# Patient Record
Sex: FEMALE | Race: WHITE | Employment: OTHER | URBAN - METROPOLITAN AREA
[De-identification: names, ages, dates, MRNs, and addresses within clinical notes are randomized per-mention and may not be internally consistent; named-entity substitution may affect disease eponyms.]

---

## 2021-04-02 ENCOUNTER — DOCUMENTATION ONLY (OUTPATIENT)
Dept: INTERNAL MEDICINE CLINIC | Age: 66
End: 2021-04-02

## 2021-04-02 ENCOUNTER — OFFICE VISIT (OUTPATIENT)
Dept: INTERNAL MEDICINE CLINIC | Age: 66
End: 2021-04-02
Payer: MEDICARE

## 2021-04-02 ENCOUNTER — HOSPITAL ENCOUNTER (OUTPATIENT)
Dept: LAB | Age: 66
Discharge: HOME | End: 2021-04-02
Payer: MEDICARE

## 2021-04-02 VITALS
TEMPERATURE: 97.3 F | SYSTOLIC BLOOD PRESSURE: 118 MMHG | RESPIRATION RATE: 14 BRPM | HEART RATE: 63 BPM | DIASTOLIC BLOOD PRESSURE: 52 MMHG | OXYGEN SATURATION: 95 % | WEIGHT: 214 LBS | HEIGHT: 62 IN | BODY MASS INDEX: 39.38 KG/M2

## 2021-04-02 DIAGNOSIS — Z78.0 POSTMENOPAUSAL STATE: ICD-10-CM

## 2021-04-02 DIAGNOSIS — K21.9 GASTROESOPHAGEAL REFLUX DISEASE WITHOUT ESOPHAGITIS: ICD-10-CM

## 2021-04-02 DIAGNOSIS — I10 ESSENTIAL HYPERTENSION: ICD-10-CM

## 2021-04-02 DIAGNOSIS — E78.5 HYPERLIPIDEMIA, UNSPECIFIED HYPERLIPIDEMIA TYPE: ICD-10-CM

## 2021-04-02 DIAGNOSIS — Z12.11 COLON CANCER SCREENING: ICD-10-CM

## 2021-04-02 DIAGNOSIS — G20 PARKINSON'S DISEASE (HCC): ICD-10-CM

## 2021-04-02 DIAGNOSIS — E11.65 TYPE 2 DIABETES MELLITUS WITH HYPERGLYCEMIA, WITHOUT LONG-TERM CURRENT USE OF INSULIN (HCC): ICD-10-CM

## 2021-04-02 DIAGNOSIS — Z23 ENCOUNTER FOR IMMUNIZATION: ICD-10-CM

## 2021-04-02 DIAGNOSIS — Z12.31 ENCOUNTER FOR SCREENING MAMMOGRAM FOR MALIGNANT NEOPLASM OF BREAST: ICD-10-CM

## 2021-04-02 DIAGNOSIS — Z13.31 POSITIVE DEPRESSION SCREENING: ICD-10-CM

## 2021-04-02 DIAGNOSIS — Z76.89 ENCOUNTER TO ESTABLISH CARE: ICD-10-CM

## 2021-04-02 DIAGNOSIS — M17.0 PRIMARY OSTEOARTHRITIS OF BOTH KNEES: ICD-10-CM

## 2021-04-02 DIAGNOSIS — N32.81 OAB (OVERACTIVE BLADDER): ICD-10-CM

## 2021-04-02 DIAGNOSIS — F41.9 ANXIETY: ICD-10-CM

## 2021-04-02 DIAGNOSIS — G25.81 RLS (RESTLESS LEGS SYNDROME): ICD-10-CM

## 2021-04-02 DIAGNOSIS — Z13.6 SCREENING FOR ISCHEMIC HEART DISEASE: ICD-10-CM

## 2021-04-02 DIAGNOSIS — Z71.89 ADVANCED DIRECTIVES, COUNSELING/DISCUSSION: ICD-10-CM

## 2021-04-02 DIAGNOSIS — M54.10 RADICULOPATHY OF ARM: ICD-10-CM

## 2021-04-02 DIAGNOSIS — Z79.899 MEDICATION MANAGEMENT: ICD-10-CM

## 2021-04-02 DIAGNOSIS — G47.30 SLEEP APNEA, UNSPECIFIED TYPE: ICD-10-CM

## 2021-04-02 DIAGNOSIS — Z00.00 MEDICARE ANNUAL WELLNESS VISIT, SUBSEQUENT: Primary | ICD-10-CM

## 2021-04-02 LAB
ALBUMIN SERPL-MCNC: 3.5 G/DL (ref 3.4–5)
ALBUMIN/GLOB SERPL: 1.1 {RATIO} (ref 0.8–1.7)
ALP SERPL-CCNC: 126 U/L (ref 45–117)
ALT SERPL-CCNC: 12 U/L (ref 13–56)
ANION GAP SERPL CALC-SCNC: 7 MMOL/L (ref 3–18)
AST SERPL-CCNC: 19 U/L (ref 10–38)
BILIRUB SERPL-MCNC: 0.6 MG/DL (ref 0.2–1)
BUN SERPL-MCNC: 12 MG/DL (ref 7–18)
BUN/CREAT SERPL: 17 (ref 12–20)
CALCIUM SERPL-MCNC: 8.9 MG/DL (ref 8.5–10.1)
CHLORIDE SERPL-SCNC: 105 MMOL/L (ref 100–111)
CHOLEST SERPL-MCNC: 122 MG/DL
CO2 SERPL-SCNC: 30 MMOL/L (ref 21–32)
CREAT SERPL-MCNC: 0.69 MG/DL (ref 0.6–1.3)
GLOBULIN SER CALC-MCNC: 3.1 G/DL (ref 2–4)
GLUCOSE SERPL-MCNC: 113 MG/DL (ref 74–99)
HBA1C MFR BLD: 6.1 % (ref 4.2–5.6)
HDLC SERPL-MCNC: 36 MG/DL (ref 40–60)
HDLC SERPL: 3.4 {RATIO} (ref 0–5)
LDLC SERPL CALC-MCNC: 42.6 MG/DL (ref 0–100)
LIPID PROFILE,FLP: ABNORMAL
POTASSIUM SERPL-SCNC: 4.8 MMOL/L (ref 3.5–5.5)
PROT SERPL-MCNC: 6.6 G/DL (ref 6.4–8.2)
SODIUM SERPL-SCNC: 142 MMOL/L (ref 136–145)
T4 FREE SERPL-MCNC: 1.2 NG/DL (ref 0.7–1.5)
TRIGL SERPL-MCNC: 217 MG/DL (ref ?–150)
TSH SERPL DL<=0.05 MIU/L-ACNC: 1.28 UIU/ML (ref 0.36–3.74)
VLDLC SERPL CALC-MCNC: 43.4 MG/DL

## 2021-04-02 PROCEDURE — G9899 SCRN MAM PERF RSLTS DOC: HCPCS | Performed by: NURSE PRACTITIONER

## 2021-04-02 PROCEDURE — 1101F PT FALLS ASSESS-DOCD LE1/YR: CPT | Performed by: NURSE PRACTITIONER

## 2021-04-02 PROCEDURE — 80061 LIPID PANEL: CPT

## 2021-04-02 PROCEDURE — G0009 ADMIN PNEUMOCOCCAL VACCINE: HCPCS | Performed by: NURSE PRACTITIONER

## 2021-04-02 PROCEDURE — 3017F COLORECTAL CA SCREEN DOC REV: CPT | Performed by: NURSE PRACTITIONER

## 2021-04-02 PROCEDURE — G8754 DIAS BP LESS 90: HCPCS | Performed by: NURSE PRACTITIONER

## 2021-04-02 PROCEDURE — G0447 BEHAVIOR COUNSEL OBESITY 15M: HCPCS | Performed by: NURSE PRACTITIONER

## 2021-04-02 PROCEDURE — 90732 PPSV23 VACC 2 YRS+ SUBQ/IM: CPT | Performed by: NURSE PRACTITIONER

## 2021-04-02 PROCEDURE — 84439 ASSAY OF FREE THYROXINE: CPT

## 2021-04-02 PROCEDURE — G8432 DEP SCR NOT DOC, RNG: HCPCS | Performed by: NURSE PRACTITIONER

## 2021-04-02 PROCEDURE — G8427 DOCREV CUR MEDS BY ELIG CLIN: HCPCS | Performed by: NURSE PRACTITIONER

## 2021-04-02 PROCEDURE — G8419 CALC BMI OUT NRM PARAM NOF/U: HCPCS | Performed by: NURSE PRACTITIONER

## 2021-04-02 PROCEDURE — 83036 HEMOGLOBIN GLYCOSYLATED A1C: CPT

## 2021-04-02 PROCEDURE — 80053 COMPREHEN METABOLIC PANEL: CPT

## 2021-04-02 PROCEDURE — G0439 PPPS, SUBSEQ VISIT: HCPCS | Performed by: NURSE PRACTITIONER

## 2021-04-02 PROCEDURE — G8536 NO DOC ELDER MAL SCRN: HCPCS | Performed by: NURSE PRACTITIONER

## 2021-04-02 PROCEDURE — 99497 ADVNCD CARE PLAN 30 MIN: CPT | Performed by: NURSE PRACTITIONER

## 2021-04-02 PROCEDURE — 1090F PRES/ABSN URINE INCON ASSESS: CPT | Performed by: NURSE PRACTITIONER

## 2021-04-02 PROCEDURE — G8400 PT W/DXA NO RESULTS DOC: HCPCS | Performed by: NURSE PRACTITIONER

## 2021-04-02 PROCEDURE — G8752 SYS BP LESS 140: HCPCS | Performed by: NURSE PRACTITIONER

## 2021-04-02 PROCEDURE — 99215 OFFICE O/P EST HI 40 MIN: CPT | Performed by: NURSE PRACTITIONER

## 2021-04-02 RX ORDER — ASPIRIN 81 MG/1
TABLET ORAL DAILY
COMMUNITY

## 2021-04-02 RX ORDER — ATORVASTATIN CALCIUM 20 MG/1
TABLET, FILM COATED ORAL DAILY
COMMUNITY
End: 2021-04-05 | Stop reason: SDUPTHER

## 2021-04-02 RX ORDER — GABAPENTIN 100 MG/1
CAPSULE ORAL 3 TIMES DAILY
COMMUNITY
End: 2021-04-05 | Stop reason: DRUGHIGH

## 2021-04-02 RX ORDER — OXYBUTYNIN CHLORIDE 5 MG/1
5 TABLET ORAL 3 TIMES DAILY
COMMUNITY
End: 2021-04-05 | Stop reason: SDUPTHER

## 2021-04-02 RX ORDER — FLUOXETINE HYDROCHLORIDE 20 MG/1
CAPSULE ORAL DAILY
COMMUNITY
End: 2021-04-05 | Stop reason: SDUPTHER

## 2021-04-02 RX ORDER — PANTOPRAZOLE SODIUM 20 MG/1
20 TABLET, DELAYED RELEASE ORAL DAILY
COMMUNITY
End: 2021-04-05 | Stop reason: SDUPTHER

## 2021-04-02 RX ORDER — CARBIDOPA AND LEVODOPA 25; 100 MG/1; MG/1
1 TABLET ORAL 3 TIMES DAILY
COMMUNITY
End: 2021-04-05 | Stop reason: SDUPTHER

## 2021-04-02 RX ORDER — BISOPROLOL FUMARATE 5 MG/1
5 TABLET ORAL DAILY
COMMUNITY
End: 2021-04-05 | Stop reason: SINTOL

## 2021-04-02 RX ORDER — FLUOXETINE HYDROCHLORIDE 40 MG/1
CAPSULE ORAL DAILY
COMMUNITY
End: 2021-04-05 | Stop reason: SDUPTHER

## 2021-04-02 NOTE — PROGRESS NOTES
Chief Complaint   Patient presents with   Carl Grimaldo \[facility]\"" Care    Arm Pain     right arm pain, onset 2 weeks, worsens at night

## 2021-04-02 NOTE — ACP (ADVANCE CARE PLANNING)
Advance Care Planning     General Advance Care Planning (ACP) Conversation      Date of Conversation: 4/2/2021  Conducted with: Patient with Decision Making Capacity    Healthcare Decision Maker:     Click here to complete [employer] including selection of the [employer] Relationship (ie \"Primary\")  Today we documented Decision Maker(s) consistent with Legal Next of Kin hierarchy. Content/Action Overview:    Has ACP document(s) NOT on file - requested patient to provide  Reviewed DNR/DNI and patient elects Full Code (Attempt Resuscitation)  Topics discussed: ventilation preferences, hospitalization preferences and resuscitation preferences       Length of Voluntary ACP Conversation in minutes:  16 minutes    Deborra Lipoma, DNP

## 2021-04-02 NOTE — PATIENT INSTRUCTIONS
Vaccine Information Statement    Pneumococcal Polysaccharide Vaccine (PPSV23): What You Need to Know    Many Vaccine Information Statements are available in [place] and other languages. See www.immunize.org/vis  Hojas de información sobre vacunas están disponibles en español y en muchos otros idiomas. Visite www.immunize.org/vis    1. Why get vaccinated? Pneumococcal polysaccharide vaccine (PPSV23) can prevent pneumococcal disease. Pneumococcal disease refers to any illness caused by pneumococcal bacteria. These bacteria can cause many types of illnesses, including pneumonia, which is an infection of the lungs. Pneumococcal bacteria are one of the most common causes of pneumonia. Besides pneumonia, pneumococcal bacteria can also cause:   Ear infections   Sinus infections   Meningitis (infection of the tissue covering the brain and spinal cord)   Bacteremia (bloodstream infection)    Anyone can get pneumococcal disease, but children under 3years of age, people with certain medical conditions, adults 72 years or older, and cigarette smokers are at the highest risk. Most pneumococcal infections are mild. However, some can result in long-term problems, such as brain damage or hearing loss. Meningitis, bacteremia, and pneumonia caused by pneumococcal disease can be fatal.     2. PPSV23     PPSV23 protects against 23 types of bacteria that cause pneumococcal disease. PPSV23 is recommended for:   All adults 72 years or older,   Anyone 2 years or older with certain medical conditions that can lead to an increased risk for pneumococcal disease. Most people need only one dose of PPSV23. A second dose of PPSV23, and another type of pneumococcal vaccine called PCV13, are recommended for certain high-risk groups. Your health care provider can give you more information.     People 65 years or older should get a dose of PPSV23 even if they have already gotten one or more doses of the vaccine before they turned 72.    3. Talk with your health care provider    Tell your vaccine provider if the person getting the vaccine:   Has had an allergic reaction after a previous dose of PPSV23, or has any severe, life-threatening allergies. In some cases, your health care provider may decide to postpone PPSV23 vaccination to a future visit. People with minor illnesses, such as a cold, may be vaccinated. People who are moderately or severely ill should usually wait until they recover before getting PPSV23. Your health care provider can give you more information. 4. Risks of a vaccine reaction     Redness or pain where the shot is given, feeling tired, fever, or muscle aches can happen after PPSV23. People sometimes faint after medical procedures, including vaccination. Tell your provider if you feel dizzy or have vision changes or ringing in the ears. As with any medicine, there is a very remote chance of a vaccine causing a severe allergic reaction, other serious injury, or death. 5. What if there is a serious problem? An allergic reaction could occur after the vaccinated person leaves the clinic. If you see signs of a severe allergic reaction (hives, swelling of the face and throat, difficulty breathing, a fast heartbeat, dizziness, or weakness), call 9-1-1 and get the person to the nearest hospital.    For other signs that concern you, call your health care provider. Adverse reactions should be reported to the Vaccine Adverse Event Reporting System (VAERS). Your health care provider will usually file this report, or you can do it yourself. Visit the VAERS website at www.vaers. hhs.gov or call 1-900.684.5365. VAERS is only for reporting reactions, and VAERS staff do not give medical advice. 6. How can I learn more?  Ask your health care provider.  Call your local or state health department.    Contact the Centers for Disease Control and Prevention (CDC):  - Call 9-856.543.1421 (4-883-DNQ-INFO) or  - Visit CDCs website at www.cdc.gov/vaccines    Vaccine Information Statement   PPSV23   10/30/2019    [facility] and [facility] for Disease Control and Prevention    Office Use Only      Medicare Wellness Visit, Female     The best way to live healthy is to have a lifestyle where you eat a well-balanced diet, exercise regularly, limit alcohol use, and quit all forms of tobacco/nicotine, if applicable. Regular preventive services are another way to keep healthy. Preventive services (vaccines, screening tests, monitoring & exams) can help personalize your care plan, which helps you manage your own care. Screening tests can find health problems at the earliest stages, when they are easiest to treat. [facility] follows the current, evidence-based guidelines published by the [facility] [employer] ([facility]) when recommending preventive services for our patients. Because we follow these guidelines, sometimes recommendations change over time as research supports it. (For example, mammograms used to be recommended annually. Even though Medicare will still pay for an annual mammogram, the newer guidelines recommend a mammogram every two years for women of average risk). Of course, you and your doctor may decide to screen more often for some diseases, based on your risk and your co-morbidities (chronic disease you are already diagnosed with). Preventive services for you include:  - Medicare offers their members a free annual wellness visit, which is time for you and your primary care provider to discuss and plan for your preventive service needs. Take advantage of this benefit every year!  -All adults over the age of 72 should receive the recommended pneumonia vaccines.  Current USPSTF guidelines recommend a series of two vaccines for the best pneumonia protection.   -All adults should have a flu vaccine yearly and a tetanus vaccine every 10 years.   -All adults age 48 and older should receive the shingles vaccines (series of two vaccines). -All adults age 38-68 who are overweight should have a diabetes screening test once every three years.   -All adults born between [birth year] and 1965 should be screened once for Hepatitis C.  -Other screening tests and preventive services for persons with diabetes include: an eye exam to screen for diabetic retinopathy, a kidney function test, a foot exam, and stricter control over your cholesterol.   -Cardiovascular screening for adults with routine risk involves an electrocardiogram (ECG) at intervals determined by your doctor.   -Colorectal cancer screenings should be done for adults age 54-65 with no increased risk factors for colorectal cancer. There are a number of acceptable methods of screening for this type of cancer. Each test has its own benefits and drawbacks. Discuss with your doctor what is most appropriate for you during your annual wellness visit. The different tests include: colonoscopy (considered the best screening method), a fecal occult blood test, a fecal DNA test, and sigmoidoscopy.    -A bone mass density test is recommended when a woman turns 65 to screen for osteoporosis. This test is only recommended one time, as a screening. Some providers will use this same test as a disease monitoring tool if you already have osteoporosis. -Breast cancer screenings are recommended every other year for women of normal risk, age 54-69.  -Cervical cancer screenings for women over age 72 are only recommended with certain risk factors.      Here is a list of your current Health Maintenance items (your personalized list of preventive services) with a due date:  Health Maintenance Due   Topic Date Due    Hepatitis C Test  Never done    Cholesterol Test   Never done    COVID-19 Vaccine (1) Never done    DTaP/Tdap/Td  (1 - Tdap) Never done    Shingles Vaccine (1 of 2) Never done   [facility] Colorectal Screening  Never done    Mammogram  Never done    Bone Mineral Density   Never done    Pneumococcal Vaccine (1 of 1 - PPSV23) Never done

## 2021-04-02 NOTE — PROGRESS NOTES
Nancy Burger 1955 female who presents for routine immunizations. Patient denies any symptoms , reactions or allergies that would exclude them from being immunized today. Risks and adverse reactions were discussed and the VIS was given to them. All questions were addressed. Order placed for Pneumonia 23 (left deltoid), per Verbal Order from [place], [place], with read back. Patient declined observation.     Ling Thomas, CMA

## 2021-04-02 NOTE — PROGRESS NOTES
This is the Subsequent Medicare Annual Wellness Exam, performed 12 months or more after the Initial AWV or the last Subsequent AWV    I have reviewed the patient's medical history in detail and updated the computerized patient record. Assessment/Plan   Education and counseling provided:  Are appropriate based on today's review and evaluation  Pneumococcal Vaccine  Screening Mammography  Colorectal cancer screening tests  Cardiovascular screening blood test  Bone mass measurement (DEXA)  Screening for glaucoma  Shingrix and Shingles vaccine. Overall pt is doing well. 1. Medicare annual wellness visit, subsequent  2. Advanced directives, counseling/discussion  -     FULL CODE  3. Screening for ischemic heart disease  -     LIPID PANEL; Future  -     LIPID PANEL; Future  4. Encounter for screening mammogram for malignant neoplasm of breast  -     LULÚ MAMMO BI SCREENING INCL CAD; Future  5. Postmenopausal state  -     DEXA BONE DENSITY STUDY AXIAL; Future  6. Body mass index 39.0-39.9, adult  -     ME BEHAVIOR  OBESITY 15M  7. Colon cancer screening  -     REFERRAL FOR COLONOSCOPY  8. Encounter for immunization  -     ADMIN PNEUMOCOCCAL VACCINE  -     ADMIN PNEUMOCOCCAL VACCINE  -     PNEUMOCOCCAL POLYSACCHARIDE VACCINE, 23-VALENT, ADULT OR IMMUNOSUPPRESSED PT DOSE,  9. Hyperlipidemia, unspecified hyperlipidemia type  -     TSH AND FREE T4; Future  -     LIPID PANEL; Future  -     METABOLIC PANEL, COMPREHENSIVE; Future  -     HEMOGLOBIN A1C W/O EAG; Future  10. Encounter to establish care  11. Essential hypertension  -     TSH AND FREE T4; Future  -     LIPID PANEL; Future  -     METABOLIC PANEL, COMPREHENSIVE; Future  -     HEMOGLOBIN A1C W/O EAG; Future  12. Parkinson's disease ([facility] [address]  13. Anxiety  14. Sleep apnea, unspecified type  -     REFERRAL TO PULMONARY DISEASE  15. OAB (overactive bladder)  16. Primary osteoarthritis of both knees  17. RLS (restless legs syndrome)  18.  Radiculopathy of arm Depression Risk Factor Screening     3 most recent PHQ Screens 4/2/2021   Little interest or pleasure in doing things Not at all   Feeling down, depressed, irritable, or hopeless Not at all   Total Score PHQ 2 0   Trouble falling or staying asleep, or sleeping too much Nearly every day   Feeling tired or having little energy Nearly every day   Poor appetite, weight loss, or overeating Not at all   Feeling bad about yourself - or that you are a failure or have let yourself or your family down Not at all   Trouble concentrating on things such as school, work, reading, or watching TV Not at all   Moving or speaking so slowly that other people could have noticed; or the opposite being so fidgety that others notice Not at all   Thoughts of being better off dead, or hurting yourself in some way Not at all   PHQ 9 Score 6   How difficult have these problems made it for you to do your work, take care of your home and get along with others Not difficult at all       Alcohol Risk Screen    Do you average more than 1 drink per night or more than 7 drinks a week:  No    On any one occasion in the past three months have you have had more than 3 drinks containing alcohol:  No        Functional Ability and Level of Safety    Hearing: Hearing is good. Activities of Daily Living: The home contains: no safety equipment. Patient does total self care      Ambulation: with no difficulty     Fall Risk:  Fall Risk Assessment, last 12 mths 4/2/2021   Able to walk? Yes   Fall in past 12 months? 0   Do you feel unsteady? 1   Are you worried about falling 1   Is TUG test greater than 12 seconds?  0   Is the gait abnormal? 1   Number of falls in past 12 months 0      Abuse Screen:  Patient is not abused       Cognitive Screening    Has your family/caregiver stated any concerns about your memory: no     Cognitive Screening: Normal - Clock Drawing Test    Health Maintenance Due     Health Maintenance Due   Topic Date Due    Hepatitis C Screening  Never done    COVID-19 Vaccine (1) Never done    DTaP/Tdap/Td series (1 - Tdap) Never done    Shingrix Vaccine Age 50> (1 of 2) Never done    Colorectal Cancer Screening Combo  Never done    Breast Cancer Screen Mammogram  Never done    Bone Densitometry (Dexa) Screening  Never done       Patient Care Team   Patient Care Team:  Thurman Boeck, DNP as PCP - General (Nurse Practitioner)  Thurman Boeck, DNP as PCP - [facility] Empaneled Provider    History   There is no problem list on file for this patient. History reviewed. No pertinent past medical history. History reviewed. No pertinent surgical history. Current Outpatient Medications   Medication Sig Dispense Refill    bisoprolol (ZEBETA) 5 mg tablet Take 5 mg by mouth daily.  FLUoxetine (PROzac) 20 mg capsule Take  by mouth daily.  FLUoxetine (PROzac) 40 mg capsule Take  by mouth daily.  gabapentin (NEURONTIN) 100 mg capsule Take  by mouth three (3) times daily.  aspirin delayed-release 81 mg tablet Take  by mouth daily.  pantoprazole (PROTONIX) 20 mg tablet Take 20 mg by mouth daily.  atorvastatin (LIPITOR) 20 mg tablet Take  by mouth daily.  oxybutynin (DITROPAN) 5 mg tablet Take 5 mg by mouth three (3) times daily.  carbidopa-levodopa (SINEMET)  mg per tablet Take 1 Tab by mouth three (3) times daily.        Allergies   Allergen Reactions    Latex Rash and Itching    Sulfa (Sulfonamide Antibiotics) Anaphylaxis       Family History   Problem Relation Age of Onset    Thyroid Disease Mother     Hypertension Mother     Diabetes Brother     Diabetes Brother     Heart Attack Brother      Social History     Tobacco Use    Smoking status: Never Smoker    Smokeless tobacco: Never Used   Substance Use Topics    Alcohol use: Not Currently         Joann Encarnacion DNP

## 2021-04-05 DIAGNOSIS — Z78.0 POSTMENOPAUSAL STATE: ICD-10-CM

## 2021-04-05 DIAGNOSIS — Z12.31 ENCOUNTER FOR SCREENING MAMMOGRAM FOR MALIGNANT NEOPLASM OF BREAST: ICD-10-CM

## 2021-04-05 PROBLEM — G20 PARKINSON'S DISEASE (HCC): Status: ACTIVE | Noted: 2021-04-05

## 2021-04-05 PROBLEM — I10 ESSENTIAL HYPERTENSION: Status: ACTIVE | Noted: 2021-04-05

## 2021-04-05 PROBLEM — N32.81 OAB (OVERACTIVE BLADDER): Status: ACTIVE | Noted: 2021-04-05

## 2021-04-05 PROBLEM — M17.0 PRIMARY OSTEOARTHRITIS OF BOTH KNEES: Status: ACTIVE | Noted: 2021-04-05

## 2021-04-05 PROBLEM — G25.81 RLS (RESTLESS LEGS SYNDROME): Status: ACTIVE | Noted: 2021-04-05

## 2021-04-05 PROBLEM — G47.30 SLEEP APNEA: Status: ACTIVE | Noted: 2021-04-05

## 2021-04-05 PROBLEM — E11.65 TYPE 2 DIABETES MELLITUS WITH HYPERGLYCEMIA, WITHOUT LONG-TERM CURRENT USE OF INSULIN (HCC): Status: ACTIVE | Noted: 2021-04-05

## 2021-04-05 PROBLEM — M54.10 RADICULOPATHY OF ARM: Status: ACTIVE | Noted: 2021-04-05

## 2021-04-05 PROBLEM — E78.5 HYPERLIPIDEMIA: Status: ACTIVE | Noted: 2021-04-05

## 2021-04-05 PROBLEM — F41.9 ANXIETY: Status: ACTIVE | Noted: 2021-04-05

## 2021-04-05 RX ORDER — ATORVASTATIN CALCIUM 20 MG/1
20 TABLET, FILM COATED ORAL DAILY
Qty: 90 TAB | Refills: 0 | Status: SHIPPED | OUTPATIENT
Start: 2021-04-05 | End: 2021-07-02 | Stop reason: SDUPTHER

## 2021-04-05 RX ORDER — GABAPENTIN 300 MG/1
300 CAPSULE ORAL 3 TIMES DAILY
Qty: 270 CAP | Refills: 0 | Status: SHIPPED | OUTPATIENT
Start: 2021-04-05 | End: 2021-06-07

## 2021-04-05 RX ORDER — CARBIDOPA AND LEVODOPA 25; 100 MG/1; MG/1
1 TABLET ORAL 3 TIMES DAILY
Qty: 270 TAB | Refills: 0 | Status: SHIPPED | OUTPATIENT
Start: 2021-04-05 | End: 2021-06-07 | Stop reason: DRUGHIGH

## 2021-04-05 RX ORDER — PANTOPRAZOLE SODIUM 20 MG/1
20 TABLET, DELAYED RELEASE ORAL DAILY
Qty: 90 TAB | Refills: 3 | Status: SHIPPED | OUTPATIENT
Start: 2021-04-05 | End: 2021-12-22 | Stop reason: SDUPTHER

## 2021-04-05 RX ORDER — FLUOXETINE HYDROCHLORIDE 20 MG/1
20 CAPSULE ORAL DAILY
Qty: 90 CAP | Refills: 0 | Status: SHIPPED | OUTPATIENT
Start: 2021-04-05 | End: 2021-04-20 | Stop reason: ALTCHOICE

## 2021-04-05 RX ORDER — FLUOXETINE HYDROCHLORIDE 40 MG/1
40 CAPSULE ORAL DAILY
Qty: 90 CAP | Refills: 0 | Status: SHIPPED | OUTPATIENT
Start: 2021-04-05 | End: 2021-04-20 | Stop reason: ALTCHOICE

## 2021-04-05 RX ORDER — OXYBUTYNIN CHLORIDE 5 MG/1
5 TABLET ORAL 3 TIMES DAILY
Qty: 90 TAB | Refills: 1 | Status: SHIPPED | OUTPATIENT
Start: 2021-04-05 | End: 2021-10-22

## 2021-04-05 RX ORDER — METFORMIN HYDROCHLORIDE 500 MG/1
500 TABLET ORAL
Qty: 90 TAB | Refills: 0 | Status: SHIPPED | OUTPATIENT
Start: 2021-04-05 | End: 2021-04-20

## 2021-04-05 NOTE — PROGRESS NOTES
[facility] [address] [place] [address] [place], [address]  [phone] [id number]/[phone] fax    4/2/2021    HPI:   J Carlos Thompson 1955 is a pleasant OTHER female who presents today to establish care and for routine physical exam.  Patient recently moved to [place] from [place] in March 2021. She is a native of [address] [place] [address]. She is accompanied by her daughter, Nehemiah Carreno. Patient speaks fairly good English but her daughter accompanies her to her appointments to assist with any language barriers. Today's concern: Right upper extremity pain that started 2 weeks ago. She complains of tingling which is worse at night that runs from her neck down to her fingers. She denies injury. Gabapentin does help ease the symptoms. Parkinson's disease: Patient was diagnosed in 2014. She is currently taking gabapentin and Sinemet and tolerating therapy well. Hypertension: Diagnosed in 2019. Due to multiple low blood pressure readings her hypertensive medications were discontinued. Cholesterol: Diagnosed in 2016. She continues atorvastatin 20 mg daily and tolerating therapy well. Anxiety: In 2001 she fell and fractured her ankle. Since this fall she has experienced great anxiety. She was seeing a psychiatrist in [place] who placed her on Prozac 20 mg plus Prozac 40 mg every day. This therapy has been very effective. Although she still has some anxiety it is much better controlled under the Prozac therapy. Sleep apnea: Diagnosed in 2010. She is currently not on a CPAP.    RLS: Diagnosed 2019. Taking gabapentin which has been effective for her symptoms. GERD: Taking pantoprazole 20mg daily. Therapy effective. Bilateral knee primary osteoarthritis: She has received a total of 6 injections to include gel to both of her knees. While sitting her pain score is a 4 while up and walking her pain can be a 10 out of 10. Her last injection was in March.   These last approximately 6 to 7 months. OAB: Diagnosed many years ago. She is taking oxybutynin only as needed. Mainly when she leaves the home to run errands. She has not taken this medication in over a year as she has been in her home due to Covid. HM: Colonoscopy completed 5 years ago while in [place]. She did have polyps removed. According to the daughter patient was to repeat her colonoscopy in 5 years. Daughter will try to obtain these records. She has not received her pneumonia 23 vaccination. She did receive 1 Covid vaccine prior to leaving [facility]. Now she is having a difficult time locating a site to give her her second vaccination. Current medications: Zebeta 5 mg daily, Prozac 20 mg p.o. 40 mg daily, gabapentin 100 mg 1 cap in the a.m., 1 At lunch, 3 capsules at dinner, 2 caps at bedtime, aspirin 81 mg daily, pantoprazole 20 mg daily, atorvastatin 25 mg daily, oxybutynin 5 mg 3 times daily, carbidopa levodopa 25/100 mg 3 times daily. Past Medical History:   Diagnosis Date    Anxiety 4/5/2021    Essential hypertension 4/5/2021    Hyperlipidemia 4/5/2021    OAB (overactive bladder) 4/5/2021    Parkinson's disease ([facility] [address] 4/5/2021    Primary osteoarthritis of both knees 4/5/2021    Radiculopathy of arm 4/5/2021    RLS (restless legs syndrome) 4/5/2021    Sleep apnea 4/5/2021     History reviewed. No pertinent surgical history. Current Outpatient Medications   Medication Sig    pantoprazole (PROTONIX) 20 mg tablet Take 1 Tab by mouth daily.  oxybutynin (DITROPAN) 5 mg tablet Take 1 Tab by mouth three (3) times daily.  gabapentin (NEURONTIN) 300 mg capsule Take 1 Cap by mouth three (3) times daily. Max Daily Amount: 900 mg.    FLUoxetine (PROzac) 40 mg capsule Take 1 Cap by mouth daily.  FLUoxetine (PROzac) 20 mg capsule Take 1 Cap by mouth daily.  carbidopa-levodopa (SINEMET)  mg per tablet Take 1 Tab by mouth three (3) times daily.  Indications: a type of movement disorder called parkinsonism    atorvastatin (LIPITOR) 20 mg tablet Take 1 Tab by mouth daily.  aspirin delayed-release 81 mg tablet Take  by mouth daily. No current facility-administered medications for this visit. Allergies and Intolerances: Allergies   Allergen Reactions    Latex Rash and Itching    Sulfa (Sulfonamide Antibiotics) Anaphylaxis     Family History:   Family History   Problem Relation Age of Onset    Thyroid Disease Mother     Hypertension Mother     Diabetes Brother     Diabetes Brother     Heart Attack Brother      Social History:   She  reports that she has never smoked. She has never used smokeless tobacco.   Social History     Substance and Sexual Activity   Alcohol Use Not Currently     Immunization History:  Immunization History   Administered Date(s) Administered    Pneumococcal Polysaccharide (PPSV-23) 04/02/2021       Review of Systems:   As above included in HPI. Otherwise 11 point review of systems negative including constitutional, skin, HENT, eyes, respiratory, cardiovascular, gastrointestinal, genitourinary, musculoskeletal, endocrine, hematologic, allergy, and neurologic. Physical:   Visit Vitals  BP (!) 118/52   Pulse 63   Temp 97.3 °F (36.3 °C) (Temporal)   Resp 14   Ht 5' 2\" (1.575 m)   Wt 214 lb (97.1 kg)   LMP  (LMP Unknown)   SpO2 95%   BMI 39.14 kg/m²      Wt Readings from Last 3 Encounters:   04/02/21 214 lb (97.1 kg)         Exam:   Physical Exam  Vitals signs and nursing note reviewed. Constitutional:       Appearance: Normal appearance. She is obese. Comments: Morbidly obese   HENT:      Head: Normocephalic and atraumatic. Right Ear: External ear normal.      Left Ear: External ear normal.      Nose: Nose normal.      Mouth/Throat:      Mouth: Mucous membranes are moist.   Eyes:      Extraocular Movements: Extraocular movements intact. Pupils: Pupils are equal, round, and reactive to light.    Neck: Musculoskeletal: Normal range of motion and neck supple. Cardiovascular:      Rate and Rhythm: Normal rate and regular rhythm. Heart sounds: Normal heart sounds. Comments: No edema noted  Pulmonary:      Effort: Pulmonary effort is normal. No respiratory distress. Breath sounds: Normal breath sounds. No wheezing. Abdominal:      General: Abdomen is flat. Palpations: Abdomen is soft. Musculoskeletal: Normal range of motion. Skin:     General: Skin is warm and dry. Neurological:      Mental Status: She is alert and oriented to person, place, and time. Comments: No fine tremors noted   Psychiatric:         Mood and Affect: Mood normal.         Behavior: Behavior normal.         Judgment: Judgment normal.       Health Maintenance:  Screening: (Yes/No means wether completed or not)   Mammogram: Ordered 4/2021   PAP smear: Will discuss with patient at follow-up appointment   Colorectal: Ordered 4/2021   Depression: Yes   DM (HbA1c/FPG): 4/2021 (6.1)   Hepatitis C: No   Falls: Yes   DEXA: Ordered 4/2021   Eye Exam: regular eye exams with ?  (last?)   Smoking: Never   Vitamin D: Ordered 4/2021   Medicare Wellness: 4/2/2021 subsequent      Review of Data:  Labs reviewed: N/A  Will obtain today    Impression:  Patient Active Problem List   Diagnosis Code    Hyperlipidemia E78.5    Essential hypertension I10    Parkinson's disease ([facility] [address] G20    Anxiety F41.9    Sleep apnea G47.30    OAB (overactive bladder) N32.81    Primary osteoarthritis of both knees M17.0    RLS (restless legs syndrome) G25.81    Radiculopathy of arm M54.10       Plan:    ICD-10-CM ICD-9-CM    1. Medicare annual wellness visit, subsequent  Z00.00 V70.0    2.  Advanced directives, counseling/discussion  Z71.89 V65.49 FULL CODE   3. Screening for ischemic heart disease  Z13.6 V81.0 LIPID PANEL      LIPID PANEL   4. Encounter for screening mammogram for malignant neoplasm of breast  Z12.31 V76.12 LULÚ MAMMO BI SCREENING INCL CAD   11. Postmenopausal state  Z78.0 V49.81 DEXA BONE DENSITY STUDY AXIAL   6. Body mass index 39.0-39.9, adult  Z68.39 V85.39 OR BEHAVIOR  OBESITY 15M   7. Colon cancer screening  Z12.11 V76.51 REFERRAL FOR COLONOSCOPY   8. Encounter for immunization  Z23 V03.89 ADMIN PNEUMOCOCCAL VACCINE      ADMIN PNEUMOCOCCAL VACCINE      PNEUMOCOCCAL POLYSACCHARIDE VACCINE, 23-VALENT, ADULT OR IMMUNOSUPPRESSED PT DOSE,      CANCELED: PNEUMOCOCCAL POLYSACCHARIDE VACCINE, 23-VALENT, ADULT OR IMMUNOSUPPRESSED PT DOSE,   9. Hyperlipidemia, unspecified hyperlipidemia type  E78.5 272.4 TSH AND FREE T4      LIPID PANEL      METABOLIC PANEL, COMPREHENSIVE      HEMOGLOBIN A1C W/O EAG      atorvastatin (LIPITOR) 20 mg tablet   10. Encounter to establish care  Z76.89 V65.8    11. Essential hypertension  I10 401.9 TSH AND FREE T4      LIPID PANEL      METABOLIC PANEL, COMPREHENSIVE      HEMOGLOBIN A1C W/O EAG   12. Parkinson's disease (HCC)  G20 332.0 REFERRAL TO NEUROLOGY      gabapentin (NEURONTIN) 300 mg capsule      carbidopa-levodopa (SINEMET)  mg per tablet   13. Anxiety  F41.9 300.00 FLUoxetine (PROzac) 40 mg capsule      FLUoxetine (PROzac) 20 mg capsule   14. Sleep apnea, unspecified type  G47.30 780.57 REFERRAL TO PULMONARY DISEASE   15. OAB (overactive bladder)  N32.81 596.51 oxybutynin (DITROPAN) 5 mg tablet   16. Primary osteoarthritis of both knees  M17.0 715.16    17. RLS (restless legs syndrome)  G25.81 333.94 gabapentin (NEURONTIN) 300 mg capsule   18. Radiculopathy of arm  M54.10 723.4 gabapentin (NEURONTIN) 300 mg capsule   19. Gastroesophageal reflux disease without esophagitis  K21.9 530.81 pantoprazole (PROTONIX) 20 mg tablet   20. Medication management  Z79.899 V58.69    21. Positive depression screening  Z13.31 796.4      1. Medicare annual wellness/ACP completed. 2.  Encounter to establish care. Patient is transferring to me from their previous provider.  I spent no less than 20 minutes reviewing and updating the chart to include previous labs, diagnostics, and specialty notes. 3.  BMI 39. Weight management:  BMI is out of normal parameters and plan is as follows: Discussed in great detail on diet, portion control, exercise, avoiding foods high in sugar, carbs and starches, fatty/greasy foods and to eat until satisfied not til full. I have counseled this patient on diet and exercise regimens as well. Patient verbalized understanding. Discussion about modifying behaviors regarding diet and exercise undertaken. Increase activity as tolerated   -Pt is motivated but is limited due to arthritis to both knees. Cut back on carbs and fatty foods  Calorie counting would be beneficial   -Weight loss options: Weight Watchers, exercise jemal  Discussed referral to nutritionist for further counseling              -Pt declined  Discussed weight loss target of 5% over 6-12 months being a realistic goal .  Will reevaluate maria dolores loss and goals along with management at subsequent visits. Total time: 15 minutes   4. Mixed hyperlipidemia. Patient to continue atorvastatin. Will assess lab results when available. 5.  Essential hypertension. Patient was taking [place]. Blood pressure diastolically in the 26I. Patient to discontinue this medication. Instructed daughter to check patient's blood pressure 2 times a day for the next 7 days and call results to the office. 6.  Parkinson's disease. Will refer patient to neurology. She is to continue her Sinemet therapy as this has been effective for her. She also continues gabapentin therapy. She has not noticed any worsening in her symptoms. 7.  Anxiety. Patient to continue Prozac 60 mg as this has been effective for her. She does not wish to see psychiatry at this time. Depression screen positive, PHQ 9 Score: 6, C-SSRS completed. 8.  Sleep apnea. Will refer patient to pulmonology for sleep study and follow-up. 9.  Overactive bladder.   Patient takes oxybutynin only when leaving home as she does not wish to take this medication on a regular basis. 10.  Primary osteoarthritis of both knees. Patient has received a total of 6 injections to both knees to include gel therapies. Her last injection was in March 2021. Her pain score is 4 out of 10 at rest and 10 out of 10 when ambulating. No further treatment needed at this time. 11.  RLS. Patient to continue gabapentin as prescribed as this has reduced her symptoms of RLS. 12.  Radiculopathy of right upper extremity. Increase gabapentin from 700 mg daily to 900 mg daily. If this treatment is not effective will obtain a cervical x-ray to rule out degenerative complications. May also refer to spine specialist if necessary. 13.  GERD. Patient to continue pantoprazole 20 mg daily as needed. Instructed pt to avoid foods that may stir up the reflux such as hot, spicy foods. Avoid laying down for 45 minutes after eating a meal.  Decrease caffeine, acidic foods/drinks, elimination of alcohol and tobacco products. 14.  Medication management. Reviewed medication and completed the medication reconciliation with the patient. Reviewed side effects of medications with the patient. Questions were answered and patient verb understanding. Informed patient chronic medication refills will not be given between their scheduled appointments; all refills will be given at the time of their scheduled follow-up appointments. Patient verbalized understanding      Follow up 3 months  Labs needed 1 week prior to appt: No    Dr. Jaswinder Munoz, AGNP-C, DNP  [facility]       Level 5:   60 minutes with the patient on counseling, answering questions, and/or coordination of care. Complex medical review of medical history, lab results, and testing. Complicated management plan formulated. This does not include AWV or ACP time.

## 2021-04-05 NOTE — PROGRESS NOTES
Saeed Fountain, please call patient with the followin. A1c 6.1. Type 2 diabetes. E scribed Metformin 500 mg daily. Instruct patient to cut back on foods high in carbs and sugar. We will recheck A1c in 3 months. 2.  , goal less than 150. LDL 42.6. Patient to continue atorvastatin therapy. Instructed her to cut back on her fried fatty foods. 3.  Please inform daughter that I have changed patient's gabapentin from 100 mg to 300 mg. She needs to follow the instructions on the patient's bottle when she obtains it through [employer] in pharmacy. For now she is to follow the instructions given at the patient's appointment.   Thank you

## 2021-04-06 NOTE — PROGRESS NOTES
Patient and patient daughter notified of results and messages per Dr. Eve Stephens. Patient's daughter verbalized understanding.

## 2021-04-16 ENCOUNTER — VIRTUAL VISIT (OUTPATIENT)
Dept: INTERNAL MEDICINE CLINIC | Age: 66
End: 2021-04-16
Payer: MEDICARE

## 2021-04-16 ENCOUNTER — TELEPHONE (OUTPATIENT)
Dept: INTERNAL MEDICINE CLINIC | Age: 66
End: 2021-04-16

## 2021-04-16 ENCOUNTER — HOSPITAL ENCOUNTER (OUTPATIENT)
Dept: GENERAL RADIOLOGY | Age: 66
Discharge: HOME | End: 2021-04-16
Payer: MEDICARE

## 2021-04-16 DIAGNOSIS — M79.601 RIGHT ARM PAIN: Primary | ICD-10-CM

## 2021-04-16 DIAGNOSIS — E11.65 TYPE 2 DIABETES MELLITUS WITH HYPERGLYCEMIA, WITHOUT LONG-TERM CURRENT USE OF INSULIN (HCC): ICD-10-CM

## 2021-04-16 DIAGNOSIS — F41.9 ANXIETY: ICD-10-CM

## 2021-04-16 DIAGNOSIS — Z86.79 HISTORY OF HYPERTENSION: ICD-10-CM

## 2021-04-16 DIAGNOSIS — M79.601 RIGHT ARM PAIN: ICD-10-CM

## 2021-04-16 PROCEDURE — 99442 PR PHYS/QHP TELEPHONE EVALUATION 11-20 MIN: CPT | Performed by: NURSE PRACTITIONER

## 2021-04-16 PROCEDURE — 72050 X-RAY EXAM NECK SPINE 4/5VWS: CPT

## 2021-04-16 RX ORDER — CITALOPRAM 20 MG/1
20 TABLET, FILM COATED ORAL DAILY
Qty: 90 TAB | Refills: 1 | Status: SHIPPED | OUTPATIENT
Start: 2021-04-16 | End: 2021-07-02 | Stop reason: SDUPTHER

## 2021-04-16 RX ORDER — TRAMADOL HYDROCHLORIDE 50 MG/1
50 TABLET ORAL
Qty: 24 TAB | Refills: 0 | Status: SHIPPED | OUTPATIENT
Start: 2021-04-16 | End: 2021-04-23

## 2021-04-16 NOTE — PROGRESS NOTES
[facility] [place] [address] [facility], [address] [facility] [address]  [phone] [id number]/[phone] fax    4/16/2021    HPI:   Yamel Dennison 1955 is a pleasant female who moved to [place] from [place] in March 2021. She is a native of [address] [place] [address]. Pt presents today via phone appt with continued complaint of RUE pain, Anxiety, new dx of DM, and HTN. RUE pain. This pain developed mid-March 2021. She complains of tingling which is worse at night that runs from her neck down to her fingers. She denies injury. The increased dose of Gabapentin on 4/2/2021 does help ease the symptoms. During the day her pain is 5/10 but at night when she lays down her pain jumps to 10/10. She wants to wear compression arm bands to her RUE but wanted to confirm this would be ok. Anxiety: Today pt believes she has been on Prozac for 20 years and her body is used to the medication and no longer effective. She is seeking an alternative therapy. 4/2/2021: In 2001 she fell and fractured her ankle. Since this fall she has experienced great anxiety. She was seeing a psychiatrist in [place] who placed her on Prozac 20 mg plus Prozac 40 mg every day. This therapy has been very effective. Although she still has some anxiety it is much better controlled under the Prozac therapy. New Dx DM: she was dx with DM on 4/5/2021 and was prescribed Metformin 500mg every day. She is inquiring if she can hold off on this medication and try diet and exercise first.     Hypertension: Pt sent BP readings into the office earlier today with her daughter. She is inquiring if she needs to restart BP medications. 4/2/2021: Diagnosed in 2019. Due to multiple low blood pressure readings her hypertensive medications were discontinued. ________________________________________________________      Cholesterol: Diagnosed in 2016. She continues atorvastatin 20 mg daily and tolerating therapy well. Parkinson's disease: Patient was diagnosed in 2014. She is currently taking gabapentin and Sinemet and tolerating therapy well. Sleep apnea: Diagnosed in 2010. She is currently not on a CPAP.    RLS: Diagnosed 2019. Taking gabapentin which has been effective for her symptoms. GERD: Taking pantoprazole 20mg daily. Therapy effective. Bilateral knee primary osteoarthritis: She has received a total of 6 injections to include gel to both of her knees. While sitting her pain score is a 4 while up and walking her pain can be a 10 out of 10. Her last injection was in March. These last approximately 6 to 7 months. OAB: Diagnosed many years ago. She is taking oxybutynin only as needed. Mainly when she leaves the home to run errands. She has not taken this medication in over a year as she has been in her home due to Covid. Past Medical History:   Diagnosis Date    Anxiety 4/5/2021    Essential hypertension 4/5/2021    Hyperlipidemia 4/5/2021    OAB (overactive bladder) 4/5/2021    Parkinson's disease ([facility] [address] 4/5/2021    Primary osteoarthritis of both knees 4/5/2021    Radiculopathy of arm 4/5/2021    RLS (restless legs syndrome) 4/5/2021    Sleep apnea 4/5/2021     No past surgical history on file. Current Outpatient Medications   Medication Sig    citalopram (CELEXA) 20 mg tablet Take 1 Tab by mouth daily.  traMADoL (ULTRAM) 50 mg tablet Take 1 Tab by mouth every six (6) hours as needed for Pain for up to 7 days. Max Daily Amount: 200 mg. Indications: pain    pantoprazole (PROTONIX) 20 mg tablet Take 1 Tab by mouth daily.  oxybutynin (DITROPAN) 5 mg tablet Take 1 Tab by mouth three (3) times daily.  gabapentin (NEURONTIN) 300 mg capsule Take 1 Cap by mouth three (3) times daily. Max Daily Amount: 900 mg.  carbidopa-levodopa (SINEMET)  mg per tablet Take 1 Tab by mouth three (3) times daily.  Indications: a type of movement disorder called parkinsonism    atorvastatin (LIPITOR) 20 mg tablet Take 1 Tab by mouth daily.  aspirin delayed-release 81 mg tablet Take  by mouth daily. No current facility-administered medications for this visit. Allergies and Intolerances: Allergies   Allergen Reactions    Latex Rash and Itching    Sulfa (Sulfonamide Antibiotics) Anaphylaxis     Family History:   Family History   Problem Relation Age of Onset    Thyroid Disease Mother     Hypertension Mother     Diabetes Brother     Diabetes Brother     Heart Attack Brother      Social History:   She  reports that she has never smoked. She has never used smokeless tobacco.   Social History     Substance and Sexual Activity   Alcohol Use Not Currently     Immunization History:  Immunization History   Administered Date(s) Administered    Pneumococcal Polysaccharide (PPSV-23) 04/02/2021       Review of Systems:   As above included in HPI. Otherwise 11 point review of systems negative including constitutional, skin, HENT, eyes, respiratory, cardiovascular, gastrointestinal, genitourinary, musculoskeletal, endocrine, hematologic, allergy, and neurologic. Physical:   Exam:   Vital Signs: (As obtained by patient/caregiver at home)  There were not vitals taken for this visit.      PHYSICAL EXAMINATION:  Unable to perform due to phone visit only    Health Maintenance:  Screening: (Yes/No means wether completed or not)   Mammogram: Ordered 4/2021   PAP smear: Will discuss with patient at follow-up appointment   Colorectal: Ordered 4/2021   Depression: Yes   DM (HbA1c/FPG): 4/2021 (6.1)   Hepatitis C: No   Falls: Yes   DEXA: Ordered 4/2021   Eye Exam: regular eye exams with ?  (last?)   Smoking: Never   Vitamin D: Ordered 4/2021   Medicare Wellness: 4/2/2021 subsequent   Covid: No      Review of Data:  Labs reviewed: N/A      Impression:  Patient Active Problem List   Diagnosis Code    Hyperlipidemia E78.5    Essential hypertension I10    Parkinson's disease ([facility] [address] G20    Anxiety F41.9  Sleep apnea G47.30    OAB (overactive bladder) N32.81    Primary osteoarthritis of both knees M17.0    RLS (restless legs syndrome) G25.81    Radiculopathy of arm M54.10    Type 2 diabetes mellitus with hyperglycemia, without long-term current use of insulin (HCC) E11.65       Plan:    ICD-10-CM ICD-9-CM    1. Right arm pain  M79.601 729.5 traMADoL (ULTRAM) 50 mg tablet      XR SPINE CERV 4 OR 5 V   2. Anxiety  F41.9 300.00 citalopram (CELEXA) 20 mg tablet   3. Type 2 diabetes mellitus with hyperglycemia, without long-term current use of insulin ([facility])  E11.65 250.00      790.29    4. History of hypertension  Z86.79 V12.59      1. Radiculopathy of right upper extremity. On 4/2/2021, increased gabapentin from 700 mg daily to 900 mg daily. Although this therapy has been somewhat effective, patient continues to suffer with pain. Will order a cervical x-ray and prescribe short-term opioidtramadol therapy. Depending on results of cervical x-ray, may consider referring to spine specialist.  Patient may wear compression to right upper extremity to assist with pain as needed. 2. Anxiety. Patient is currently taking Prozac 60 mg daily. She does not believe this is effective for her so she is requesting to change her medication. Patient to stop taking Prozac 25 continue Prozac 40 and start Celexa 20 mg as of today. On Saturday and Sunday she is to take Prozac 40 with Celexa 20 mg; Monday, Wednesday, and Friday she is to take the Celexa 20 mg onlyno Prozac 40.on Tuesday and Thursday she is to take Celexa 20 mg plus Prozac 40 mg. As of Friday she is to take only Celexa 20 mg no more Prozac. Instructed patient to call the office in 2 to 3 weeks to update on her status and effectiveness of this medication. If Celexa 20 mg is not effective, will increase to 40 mg.  3.  History of essential hypertension.   Patient was taking [place] Lown but due to diastolic readings in the 06U, this medication was discontinued on 4/2/2021. Patient was instructed to check her blood pressure x1 week and call the results to the office. I did receive the patient's results with the following readings: Systolic [phone] and diastolic 71-09. With these readings, I have instructed patient to both of her blood pressure medication, Venus Paddy, at this time. 4. Newly DX DM: on 4/5/21 pts A1c 6.1. She was started on metformin but pt requests to hold off on taking medication so she can work on diet. Will reassess level at follow up appt. Dr. Alton Spence, AGNP-C, DNP  [facility]     Total time: 15 minutes spent with the patient on counseling, answering questions, and/or coordination of care. Chetpatricia Hayes, who was evaluated through a synchronous (real-time) audio only encounter, and/or her healthcare decision maker, is aware that it is a billable service, with coverage as determined by her insurance carrier. She provided verbal consent to proceed: Yes, and patient identification was verified. It was conducted pursuant to the emergency declaration under the [address] [facility], [address] [place] [address] authority and the PLDT and [employer] Act. A caregiver was present when appropriate. Ability to conduct physical exam was limited. I was in the office. The patient was at home.

## 2021-04-16 NOTE — TELEPHONE ENCOUNTER
Patients daughter dropped off the [place]. I dropped it in AN's box and provided the daughter with the [address] advanced directive.

## 2021-04-20 DIAGNOSIS — M41.80 ROTOSCOLIOSIS: Primary | ICD-10-CM

## 2021-04-20 DIAGNOSIS — M47.812 FACET ARTHROPATHY, CERVICAL: ICD-10-CM

## 2021-04-20 NOTE — PROGRESS NOTES
Estefani Matos, inform patient she has arthritis in her neck. Will refer to spine specialist for consult.  Thank you

## 2021-04-20 NOTE — PROGRESS NOTES
Rachel Pelaez, inform patient she has arthritis in her neck. Will refer to spine specialist for consult. Thank you      ICD-10-CM ICD-9-CM    1. Rotoscoliosis  M41.80 737.39 REFERRAL TO PHYSICIAL MEDICINE REHAB   2.  Facet arthropathy, cervical  M47.812 721.0 REFERRAL TO PHYSICIAL MEDICINE REHAB

## 2021-04-20 NOTE — PROGRESS NOTES
Patient's daughter reached and informed of arthritis in her mothers neck and given number to the Dr. Shaji Kruse office.

## 2021-05-04 ENCOUNTER — TELEPHONE (OUTPATIENT)
Dept: INTERNAL MEDICINE CLINIC | Age: 66
End: 2021-05-04

## 2021-05-04 DIAGNOSIS — M54.10 RADICULOPATHY OF ARM: Primary | ICD-10-CM

## 2021-05-04 DIAGNOSIS — M50.30 DEGENERATIVE DISC DISEASE, CERVICAL: ICD-10-CM

## 2021-05-04 RX ORDER — TRAMADOL HYDROCHLORIDE 50 MG/1
50 TABLET ORAL
Qty: 30 TAB | Refills: 0 | Status: SHIPPED | OUTPATIENT
Start: 2021-05-04 | End: 2021-05-12

## 2021-05-04 NOTE — TELEPHONE ENCOUNTER
Requested Prescriptions     Signed Prescriptions Disp Refills    traMADoL (ULTRAM) 50 mg tablet 30 Tab 0     Sig: Take 1 Tab by mouth every six (6) hours as needed for Pain for up to 8 days. Max Daily Amount: 200 mg.  Indications: pain     Authorizing Provider: Magali Briscoe

## 2021-05-04 NOTE — TELEPHONE ENCOUNTER
Daughter stating she is scheduled to see ortho later this month. She wants to know if Dr. Rashad Echeverria can refill her Tramadol one time to.

## 2021-05-13 ENCOUNTER — PATIENT MESSAGE (OUTPATIENT)
Dept: INTERNAL MEDICINE CLINIC | Age: 66
End: 2021-05-13

## 2021-05-18 NOTE — PROGRESS NOTES
[facility]  [address], [address]  Phone: [phone]  Fax: [phone]        INITIAL CONSULTATION      HISTORY OF PRESENT ILLNESS:  Kyaw Panda is a 77 y.o. female whom is referred from [place], [place] secondary to progressive neck pain radiating into the RUE to the hand involving all digits (worse in the first digit) x 2 months without trauma. She rates her pain 5/10. She is taking Neurontin 300 mg TID for RLS and parkinson's, without relief of her neck and RUE pain. Patient denies previous cervical spinal surgery, injections, or physical therapy/chiropractic care. Pt denies fever or weight loss. Pt self- reports a rash on the left thigh that has been dx as dermatitis. Pt's PCP reported pt had LOB and clumsy hands. Pt notes these symptoms predate her neck pain and are not progressive. Pt is a nonsmoker. PmHx of parkinson's disease, HTN, overactive bladder, RLS, anxiety, DM, lumbar spinal surgery. Pt has a current fracture of the RLE 5th digit x 1 week. The patient has a history of DM and reports blood sugars are well controlled, consistently remaining below 200. Note from Ángela Lucas dated 4/16/2021 indicating patient moved from [place] in March. Mid March she began having tingling from her neck to fingers, side unspecified. Started on Neurontin on 4/2/2021 which has helped. Pain is worse at night. Dx with DM in April. Taking Neurontin for RLS and parkinson's. Increased Neurontin from 700 mg daily to 900 mg daily. Ordered C spine XR. Her A1c was 6.1 on 4/5/2021. C spine XR dated 4/16/2021 films independently reviewed. Per report, multilevel degenerative findings most pronounced within the mid to lower cervical spine. MRI can be obtained for further clarification. The patient is RHD.  reviewed. Body mass index is 38.34 kg/m².     PCP: Yamini Hennessy DNP    Past Medical History:   Diagnosis Date    Anxiety 4/5/2021    Diabetes ([facility]     Essential hypertension 4/5/2021    Hyperlipidemia 4/5/2021    OAB (overactive bladder) 4/5/2021    Parkinson's disease ([facility] 4/5/2021    Primary osteoarthritis of both knees 4/5/2021    Radiculopathy of arm 4/5/2021    RLS (restless legs syndrome) 4/5/2021    Sleep apnea 4/5/2021        History reviewed. No pertinent surgical history. Social History     Tobacco Use    Smoking status: Never Smoker    Smokeless tobacco: Never Used   Substance Use Topics    Alcohol use: Not Currently       Work status: The patient is retired. Marital status: N/A. Current Outpatient Medications   Medication Sig Dispense Refill    traMADoL (ULTRAM) 50 mg tablet Take 50 mg by mouth every six (6) hours as needed for Pain.  ibuprofen (MOTRIN) 800 mg tablet as needed.  citalopram (CELEXA) 20 mg tablet Take 1 Tab by mouth daily. 90 Tab 1    pantoprazole (PROTONIX) 20 mg tablet Take 1 Tab by mouth daily. 90 Tab 3    oxybutynin (DITROPAN) 5 mg tablet Take 1 Tab by mouth three (3) times daily. (Patient taking differently: Take 5 mg by mouth as needed.) 90 Tab 1    gabapentin (NEURONTIN) 300 mg capsule Take 1 Cap by mouth three (3) times daily. Max Daily Amount: 900 mg. 270 Cap 0    carbidopa-levodopa (SINEMET)  mg per tablet Take 1 Tab by mouth three (3) times daily. Indications: a type of movement disorder called parkinsonism 270 Tab 0    atorvastatin (LIPITOR) 20 mg tablet Take 1 Tab by mouth daily. 90 Tab 0    aspirin delayed-release 81 mg tablet Take  by mouth daily.          Allergies   Allergen Reactions    Latex Rash and Itching    Sulfa (Sulfonamide Antibiotics) Anaphylaxis            Family History   Problem Relation Age of Onset    Thyroid Disease Mother     Hypertension Mother     Diabetes Brother     Diabetes Brother     Heart Attack Brother          REVIEW OF SYSTEMS  Constitutional symptoms: Negative  Eyes: Negative  Ears, Nose, Throat, and Mouth: Negative  Cardiovascular: Negative  Respiratory: Negative  Genitourinary: Negative  Integumentary (Skin and/or breast): Negative  Musculoskeletal: Positive for neck pain. Extremities: Negative for edema. Endocrine/Rheumatologic: Negative  Hematologic/Lymphatic: Negative  Allergic/Immunologic: Negative  Psychiatric: Negative       PHYSICAL EXAMINATION  Visit Vitals  /62 (BP 1 Location: Right arm, BP Patient Position: Sitting)   Pulse 60   Temp 97.5 °F (36.4 °C) (Temporal)   Resp 14   Ht 5' 2\" (1.575 m)   Wt 209 lb 9.6 oz (95.1 kg)   LMP  (LMP Unknown)   SpO2 96%   BMI 38.34 kg/m²       CONSTITUTIONAL: NAD, A&O x 3  HEART: Regular rate and rhythm  GASTROINTESTINAL: Positive bowel sounds, soft, nontender, and nondistended  LUNGS: Clear to auscultation bilaterally. SKIN: Negative for rash. RANGE OF MOTION: The patient has full passive range of motion in all four extremities. SENSATION: Decreased sensation to light touch on the 5th digit of the RUE. Otherwise, sensation is intact to light touch throughout. MOTOR:   Straight Leg Raise: Negative, bilateral  Perla: Negative, bilateral  Tandem Gait: Not tested due to recent fracture of RLE 5th digit. Deep tendon reflexes are 1 at the biceps, triceps, and brachioradialis bilaterally. Deep tendon reflexes are 0 on the LLE and 1 on the RLE at the knees and 1 at the ankles bilaterally. Ambulates with a single point cane. Shoulder AB/Flex Elbow Flex Wrist Ext Elbow Ext Wrist Flex Hand Intrin Tone   Right +4/5 +4/5 +4/5 +4/5 +4/5 +4/5 +4/5   Left +4/5 +4/5 +4/5 +4/5 +4/5 +4/5 +4/5              Hip Flex Knee Ext Knee Flex Ankle DF GTE Ankle PF Tone   Right +4/5 +4/5 +4/5 At least 3/5 strength. Not fully tested due to fracture. +4/5 At least 3/5 strength. Not fully tested due to fracture. +4/5   Left +4/5 +4/5 +4/5 +4/5 +4/5 +4/5 +4/5       ASSESSMENT   Diagnoses and all orders for this visit:    1.  Cervical spondylosis without myelopathy  -     REFERRAL TO PHYSICAL THERAPY  -     gabapentin (Neurontin) 600 mg tablet; Take 1 Tablet by mouth three (3) times daily. Max Daily Amount: 1,800 mg.    2. Cervical neuritis  -     REFERRAL TO PHYSICAL THERAPY  -     gabapentin (Neurontin) 600 mg tablet; Take 1 Tablet by mouth three (3) times daily. Max Daily Amount: 1,800 mg.    3. DDD (degenerative disc disease), cervical  -     REFERRAL TO PHYSICAL THERAPY  -     gabapentin (Neurontin) 600 mg tablet; Take 1 Tablet by mouth three (3) times daily. Max Daily Amount: 1,800 mg. IMPRESSIONS/RECOMMENDATIONS:  Patient presents today with c/o neck pain radiating into the RUE to the hand involving all digits (worse in the first digit). Multiple treatment options were discussed. Consideration was given to starting the pt on a MDP. In light of her RLE 5th digit fracture, this was deferred. I will increase her Neurontin from 300 mg TID to 600 mg TID. Patient advised to call the office if intolerant to higher dose. I will refer her to physical therapy with an emphasis on HEP. Patient is neurologically intact. I will see the patient back in 6 week's time or earlier if needed. Written by Marquis Eugene, as dictated by Niki Boston MD  I examined the patient, reviewed and agree with the note.

## 2021-05-20 ENCOUNTER — OFFICE VISIT (OUTPATIENT)
Dept: ORTHOPEDIC SURGERY | Age: 66
End: 2021-05-20
Payer: MEDICARE

## 2021-05-20 VITALS
HEART RATE: 60 BPM | TEMPERATURE: 97.5 F | DIASTOLIC BLOOD PRESSURE: 62 MMHG | OXYGEN SATURATION: 96 % | SYSTOLIC BLOOD PRESSURE: 134 MMHG | BODY MASS INDEX: 38.57 KG/M2 | WEIGHT: 209.6 LBS | RESPIRATION RATE: 14 BRPM | HEIGHT: 62 IN

## 2021-05-20 DIAGNOSIS — M47.812 CERVICAL SPONDYLOSIS WITHOUT MYELOPATHY: Primary | ICD-10-CM

## 2021-05-20 DIAGNOSIS — M50.30 DDD (DEGENERATIVE DISC DISEASE), CERVICAL: ICD-10-CM

## 2021-05-20 DIAGNOSIS — M54.12 CERVICAL NEURITIS: ICD-10-CM

## 2021-05-20 PROCEDURE — G8536 NO DOC ELDER MAL SCRN: HCPCS | Performed by: PHYSICAL MEDICINE & REHABILITATION

## 2021-05-20 PROCEDURE — 1101F PT FALLS ASSESS-DOCD LE1/YR: CPT | Performed by: PHYSICAL MEDICINE & REHABILITATION

## 2021-05-20 PROCEDURE — 3017F COLORECTAL CA SCREEN DOC REV: CPT | Performed by: PHYSICAL MEDICINE & REHABILITATION

## 2021-05-20 PROCEDURE — G8427 DOCREV CUR MEDS BY ELIG CLIN: HCPCS | Performed by: PHYSICAL MEDICINE & REHABILITATION

## 2021-05-20 PROCEDURE — G8510 SCR DEP NEG, NO PLAN REQD: HCPCS | Performed by: PHYSICAL MEDICINE & REHABILITATION

## 2021-05-20 PROCEDURE — G8400 PT W/DXA NO RESULTS DOC: HCPCS | Performed by: PHYSICAL MEDICINE & REHABILITATION

## 2021-05-20 PROCEDURE — 99204 OFFICE O/P NEW MOD 45 MIN: CPT | Performed by: PHYSICAL MEDICINE & REHABILITATION

## 2021-05-20 PROCEDURE — G9899 SCRN MAM PERF RSLTS DOC: HCPCS | Performed by: PHYSICAL MEDICINE & REHABILITATION

## 2021-05-20 PROCEDURE — 1090F PRES/ABSN URINE INCON ASSESS: CPT | Performed by: PHYSICAL MEDICINE & REHABILITATION

## 2021-05-20 PROCEDURE — G8754 DIAS BP LESS 90: HCPCS | Performed by: PHYSICAL MEDICINE & REHABILITATION

## 2021-05-20 PROCEDURE — G8417 CALC BMI ABV UP PARAM F/U: HCPCS | Performed by: PHYSICAL MEDICINE & REHABILITATION

## 2021-05-20 PROCEDURE — G8752 SYS BP LESS 140: HCPCS | Performed by: PHYSICAL MEDICINE & REHABILITATION

## 2021-05-20 RX ORDER — IBUPROFEN 800 MG/1
TABLET ORAL AS NEEDED
COMMUNITY
Start: 2021-05-14 | End: 2022-07-18

## 2021-05-20 RX ORDER — GABAPENTIN 600 MG/1
600 TABLET ORAL 3 TIMES DAILY
Qty: 90 TABLET | Refills: 1 | Status: SHIPPED | OUTPATIENT
Start: 2021-05-20 | End: 2021-07-02 | Stop reason: SDUPTHER

## 2021-05-20 RX ORDER — TRAMADOL HYDROCHLORIDE 50 MG/1
50 TABLET ORAL
COMMUNITY
End: 2021-07-02 | Stop reason: SDUPTHER

## 2021-05-20 NOTE — LETTER
5/20/2021 Patient: J Carlos Thompson YOB: 1955 Date of Visit: 5/20/2021 Ángela Carranza 
[address] [facility] [address] [place] [address] [address] [employer] Dear Jung Kim DNP, Thank you for referring Ms. Aron Soto to [address] for evaluation. My notes for this consultation are attached. If you have questions, please do not hesitate to call me. I look forward to following your patient along with you. Sincerely, Marion Baltazar MD

## 2021-05-25 ENCOUNTER — OFFICE VISIT (OUTPATIENT)
Dept: INTERNAL MEDICINE CLINIC | Age: 66
End: 2021-05-25
Payer: MEDICARE

## 2021-05-25 VITALS
RESPIRATION RATE: 16 BRPM | HEIGHT: 62 IN | BODY MASS INDEX: 38.24 KG/M2 | OXYGEN SATURATION: 97 % | SYSTOLIC BLOOD PRESSURE: 131 MMHG | DIASTOLIC BLOOD PRESSURE: 71 MMHG | HEART RATE: 68 BPM | WEIGHT: 207.8 LBS | TEMPERATURE: 96.9 F

## 2021-05-25 DIAGNOSIS — E11.65 TYPE 2 DIABETES MELLITUS WITH HYPERGLYCEMIA, WITHOUT LONG-TERM CURRENT USE OF INSULIN (HCC): Primary | ICD-10-CM

## 2021-05-25 DIAGNOSIS — B07.9 VIRAL WARTS, UNSPECIFIED TYPE: ICD-10-CM

## 2021-05-25 PROCEDURE — G8536 NO DOC ELDER MAL SCRN: HCPCS | Performed by: NURSE PRACTITIONER

## 2021-05-25 PROCEDURE — 1090F PRES/ABSN URINE INCON ASSESS: CPT | Performed by: NURSE PRACTITIONER

## 2021-05-25 PROCEDURE — G8417 CALC BMI ABV UP PARAM F/U: HCPCS | Performed by: NURSE PRACTITIONER

## 2021-05-25 PROCEDURE — G8428 CUR MEDS NOT DOCUMENT: HCPCS | Performed by: NURSE PRACTITIONER

## 2021-05-25 PROCEDURE — 2022F DILAT RTA XM EVC RTNOPTHY: CPT | Performed by: NURSE PRACTITIONER

## 2021-05-25 PROCEDURE — G8432 DEP SCR NOT DOC, RNG: HCPCS | Performed by: NURSE PRACTITIONER

## 2021-05-25 PROCEDURE — 3044F HG A1C LEVEL LT 7.0%: CPT | Performed by: NURSE PRACTITIONER

## 2021-05-25 PROCEDURE — G8752 SYS BP LESS 140: HCPCS | Performed by: NURSE PRACTITIONER

## 2021-05-25 PROCEDURE — 1101F PT FALLS ASSESS-DOCD LE1/YR: CPT | Performed by: NURSE PRACTITIONER

## 2021-05-25 PROCEDURE — G9899 SCRN MAM PERF RSLTS DOC: HCPCS | Performed by: NURSE PRACTITIONER

## 2021-05-25 PROCEDURE — 3017F COLORECTAL CA SCREEN DOC REV: CPT | Performed by: NURSE PRACTITIONER

## 2021-05-25 PROCEDURE — 99213 OFFICE O/P EST LOW 20 MIN: CPT | Performed by: NURSE PRACTITIONER

## 2021-05-25 PROCEDURE — G8400 PT W/DXA NO RESULTS DOC: HCPCS | Performed by: NURSE PRACTITIONER

## 2021-05-25 PROCEDURE — G8754 DIAS BP LESS 90: HCPCS | Performed by: NURSE PRACTITIONER

## 2021-05-25 RX ORDER — METFORMIN HYDROCHLORIDE 500 MG/1
TABLET ORAL 2 TIMES DAILY WITH MEALS
COMMUNITY
End: 2021-05-25 | Stop reason: SDUPTHER

## 2021-05-25 RX ORDER — METFORMIN HYDROCHLORIDE 500 MG/1
500 TABLET ORAL 2 TIMES DAILY WITH MEALS
Qty: 180 TABLET | Refills: 0 | Status: SHIPPED | OUTPATIENT
Start: 2021-05-25 | End: 2021-07-02 | Stop reason: SDUPTHER

## 2021-05-25 NOTE — PROGRESS NOTES
[facility] [address], [address]  [phone] QPZPIM/[phone] fax        5/25/2021    Patient Yenifer Sadler 1955     Primary MD Trevin Salas, DNP    Subjective    Yenifer Sadler a 77 y.o. female who presents with a sick visit for   Chief Complaint   Patient presents with    Cyst     pt c/o painful left reoccuring boil on left elbow, onset 2 month ago    Foreign Body in Vagina     pt c/o skin hanging near vaginal area, no pain, some odor for about 2 weeks     She looked at her vagina and thought she saw something hanging from her clitoris area. This made her nervous and she is seeking to have this evaluated. She denies difficulty with urinating or pain. Left elbow she had a growth that appeared to be a wart. She scratched this off 2 months ago, now it is grown back. It is only painful when she places pressure on the area. Decided to take metformin and is requesting a refill be sent to local pharmacy. Past Medical History:   Diagnosis Date    Anxiety 4/5/2021    Diabetes ([facility]     Essential hypertension 4/5/2021    Hyperlipidemia 4/5/2021    OAB (overactive bladder) 4/5/2021    Parkinson's disease ([facility] 4/5/2021    Primary osteoarthritis of both knees 4/5/2021    Radiculopathy of arm 4/5/2021    RLS (restless legs syndrome) 4/5/2021    Sleep apnea 4/5/2021       No past surgical history on file.     Social History     Socioeconomic History    Marital status: UNKNOWN     Spouse name: Not on file    Number of children: Not on file    Years of education: Not on file    Highest education level: Not on file   Occupational History    Not on file   Tobacco Use    Smoking status: Never Smoker    Smokeless tobacco: Never Used   Vaping Use    Vaping Use: Never used   Substance and Sexual Activity    Alcohol use: Not Currently    Drug use: Never    Sexual activity: Not on file   Other Topics Concern    Not on file Social History Narrative    Not on file     Social Determinants of Health     Financial Resource Strain:     Difficulty of Paying Living Expenses:    Food Insecurity:     Worried About Running Out of Food in the Last Year:     [address] in the Last Year:    Transportation Needs:     Lack of Transportation (Medical):  Lack of Transportation (Non-Medical):    Physical Activity:     Days of Exercise per Week:     Minutes of Exercise per Session:    Stress:     Feeling of Stress :    Social Connections:     Frequency of Communication with Friends and Family:     Frequency of Social Gatherings with Friends and Family:     Attends [employer] Services:     Active Member of Clubs or Organizations:     Attends Club or Organization Meetings:     Marital Status:    Intimate Partner Violence:     Fear of Current or Ex-Partner:     Emotionally Abused:     Physically Abused:     Sexually Abused:        Family History   Problem Relation Age of Onset    Thyroid Disease Mother     Hypertension Mother     Diabetes Brother     Diabetes Brother     Heart Attack Brother        Current Outpatient Medications on File Prior to Visit   Medication Sig Dispense Refill    traMADoL (ULTRAM) 50 mg tablet Take 50 mg by mouth every six (6) hours as needed for Pain.  ibuprofen (MOTRIN) 800 mg tablet as needed.  gabapentin (Neurontin) 600 mg tablet Take 1 Tablet by mouth three (3) times daily. Max Daily Amount: 1,800 mg. 90 Tablet 1    citalopram (CELEXA) 20 mg tablet Take 1 Tab by mouth daily. 90 Tab 1    pantoprazole (PROTONIX) 20 mg tablet Take 1 Tab by mouth daily. 90 Tab 3    oxybutynin (DITROPAN) 5 mg tablet Take 1 Tab by mouth three (3) times daily. (Patient taking differently: Take 5 mg by mouth as needed.) 90 Tab 1    gabapentin (NEURONTIN) 300 mg capsule Take 1 Cap by mouth three (3) times daily.  Max Daily Amount: 900 mg. 270 Cap 0    carbidopa-levodopa (SINEMET)  mg per tablet Take 1 Tab by mouth three (3) times daily. Indications: a type of movement disorder called parkinsonism 270 Tab 0    atorvastatin (LIPITOR) 20 mg tablet Take 1 Tab by mouth daily. 90 Tab 0    aspirin delayed-release 81 mg tablet Take  by mouth daily.  [DISCONTINUED] metFORMIN (GLUCOPHAGE) 500 mg tablet Take  by mouth two (2) times daily (with meals). No current facility-administered medications on file prior to visit. Objective    Visit Vitals  /71   Pulse 68   Temp 96.9 °F (36.1 °C) (Temporal)   Resp 16   Ht 5' 2\" (1.575 m)   Wt 207 lb 12.8 oz (94.3 kg)   SpO2 97%   BMI 38.01 kg/m²       Physical Exam  Constitutional:       Appearance: She is obese. Cardiovascular:      Rate and Rhythm: Normal rate and regular rhythm. Comments: No edema noted  Pulmonary:      Effort: Pulmonary effort is normal.      Breath sounds: Normal breath sounds. Genitourinary:     Comments: No abnormalities noted to vaginal area  Musculoskeletal:         General: Normal range of motion. Skin:            Comments: Cauliflower appearance measures 0.3cm in circ. Assessment    ICD-10-CM ICD-9-CM    1. Type 2 diabetes mellitus with hyperglycemia, without long-term current use of insulin (HCC)  E11.65 250.00 metFORMIN (GLUCOPHAGE) 500 mg tablet     790.29    2. Viral warts, unspecified type  B07.9 078.10      -Type 2 DM  Refilled metformin    -Wart  OTC wart remover  May take weeks to resolve  Will refer to derm if regrowth occurs      Dr Martha Badillo. Paige, AGNP-C, DNP  [facility] spent 20 minutes with the patient in face-to-face consultation, of which greater than 50% was spent in counseling and coordination of care as described above.

## 2021-05-25 NOTE — PROGRESS NOTES
Chief Complaint   Patient presents with    Cyst     pt c/o painful left reoccuring boil on left elbow, onset 2 month ago    Foreign Body in Vagina     pt c/o skin hanging near vaginal area, no pain, some odor for about 2 weeks       1. Have you been to the ER, urgent care clinic since your last visit? Hospitalized since your last visit? Yes, Urgent Care for broken toe 5/13  2. Have you seen or consulted any other health care providers outside of the [address] [place] [address] since your last visit? Include any pap smears or colon screening.  No.

## 2021-06-02 DIAGNOSIS — G20 PARKINSON'S DISEASE (HCC): ICD-10-CM

## 2021-06-02 DIAGNOSIS — E78.5 HYPERLIPIDEMIA, UNSPECIFIED HYPERLIPIDEMIA TYPE: ICD-10-CM

## 2021-06-02 DIAGNOSIS — E11.65 TYPE 2 DIABETES MELLITUS WITH HYPERGLYCEMIA, WITHOUT LONG-TERM CURRENT USE OF INSULIN (HCC): ICD-10-CM

## 2021-06-03 RX ORDER — FLUOXETINE HYDROCHLORIDE 40 MG/1
CAPSULE ORAL
Qty: 90 CAPSULE | OUTPATIENT
Start: 2021-06-03

## 2021-06-03 RX ORDER — FLUOXETINE HYDROCHLORIDE 20 MG/1
CAPSULE ORAL
Qty: 90 CAPSULE | OUTPATIENT
Start: 2021-06-03

## 2021-06-03 RX ORDER — CARBIDOPA AND LEVODOPA 25; 100 MG/1; MG/1
TABLET ORAL
Qty: 270 TABLET | Refills: 0 | OUTPATIENT
Start: 2021-06-03

## 2021-06-03 RX ORDER — METFORMIN HYDROCHLORIDE 500 MG/1
TABLET ORAL
Qty: 90 TABLET | OUTPATIENT
Start: 2021-06-03

## 2021-06-03 RX ORDER — ATORVASTATIN CALCIUM 20 MG/1
TABLET, FILM COATED ORAL
Qty: 90 TABLET | Refills: 0 | OUTPATIENT
Start: 2021-06-03

## 2021-06-03 NOTE — TELEPHONE ENCOUNTER
Patient has a follow-up appointment on 7/2/2021. She should have enough medications to hold her until her appointment. Sinemet due to 7/5/2021  Prozac was discontinued and Celexa   Initiated  Metformin gave 30-day supply on 5/25/2021  Atorvastatin refilled too soon.     Thank you    Requested Prescriptions     Refused Prescriptions Disp Refills    FLUoxetine (PROzac) 20 mg capsule [Pharmacy Med Name: FLUOXETINE HCL 20 MG Capsule] 90 Capsule      Sig: TAKE 1 CAPSULE EVERY DAY     Refused By: Kelley Phoenix     Reason for Refusal: Medication Discontinued    FLUoxetine (PROzac) 40 mg capsule [Pharmacy Med Name: FLUOXETINE HYDROCHLORIDE 40 MG Capsule] 90 Capsule      Sig: TAKE 1 CAPSULE EVERY DAY     Refused By: Kelley Phoenix     Reason for Refusal: Medication Discontinued    carbidopa-levodopa (SINEMET)  mg per tablet [Pharmacy Med Name: CARBIDOPA/LEVODOPA  MG Tablet] 270 Tablet 0     Sig: TAKE 1 TABLET THREE TIMES DAILY FOR A TYPE OF MOVEMENT DISORDER CALLED PARKINSONISM     Refused By: Kelley Phoenix     Reason for Refusal: Patient has requested refill too soon    atorvastatin (LIPITOR) 20 mg tablet [Pharmacy Med Name: ATORVASTATIN CALCIUM 20 MG Tablet] 90 Tablet 0     Sig: TAKE 1 TABLET EVERY DAY     Refused By: Kelley Phoenix     Reason for Refusal: Patient has requested refill too soon    metFORMIN (GLUCOPHAGE) 500 mg tablet [Pharmacy Med Name: Estelita [place] 500 MG Tablet] 90 Tablet      Sig: TAKE 1 TABLET EVERY DAY WITH BREAKFAST FOR DIABETES     Refused By: Kelley Phoenix     Reason for Refusal: Patient has requested refill too soon

## 2021-06-07 ENCOUNTER — OFFICE VISIT (OUTPATIENT)
Dept: NEUROLOGY | Age: 66
End: 2021-06-07
Payer: MEDICARE

## 2021-06-07 VITALS
HEIGHT: 62 IN | OXYGEN SATURATION: 97 % | DIASTOLIC BLOOD PRESSURE: 70 MMHG | RESPIRATION RATE: 20 BRPM | HEART RATE: 72 BPM | SYSTOLIC BLOOD PRESSURE: 128 MMHG | WEIGHT: 208.4 LBS | TEMPERATURE: 96.3 F | BODY MASS INDEX: 38.35 KG/M2

## 2021-06-07 DIAGNOSIS — G25.81 RLS (RESTLESS LEGS SYNDROME): ICD-10-CM

## 2021-06-07 DIAGNOSIS — G47.33 OSA (OBSTRUCTIVE SLEEP APNEA): ICD-10-CM

## 2021-06-07 DIAGNOSIS — G20 PARKINSON DISEASE (HCC): Primary | ICD-10-CM

## 2021-06-07 PROCEDURE — G8427 DOCREV CUR MEDS BY ELIG CLIN: HCPCS | Performed by: STUDENT IN AN ORGANIZED HEALTH CARE EDUCATION/TRAINING PROGRAM

## 2021-06-07 PROCEDURE — G8400 PT W/DXA NO RESULTS DOC: HCPCS | Performed by: STUDENT IN AN ORGANIZED HEALTH CARE EDUCATION/TRAINING PROGRAM

## 2021-06-07 PROCEDURE — 3017F COLORECTAL CA SCREEN DOC REV: CPT | Performed by: STUDENT IN AN ORGANIZED HEALTH CARE EDUCATION/TRAINING PROGRAM

## 2021-06-07 PROCEDURE — G8754 DIAS BP LESS 90: HCPCS | Performed by: STUDENT IN AN ORGANIZED HEALTH CARE EDUCATION/TRAINING PROGRAM

## 2021-06-07 PROCEDURE — G8417 CALC BMI ABV UP PARAM F/U: HCPCS | Performed by: STUDENT IN AN ORGANIZED HEALTH CARE EDUCATION/TRAINING PROGRAM

## 2021-06-07 PROCEDURE — 99204 OFFICE O/P NEW MOD 45 MIN: CPT | Performed by: STUDENT IN AN ORGANIZED HEALTH CARE EDUCATION/TRAINING PROGRAM

## 2021-06-07 PROCEDURE — G8432 DEP SCR NOT DOC, RNG: HCPCS | Performed by: STUDENT IN AN ORGANIZED HEALTH CARE EDUCATION/TRAINING PROGRAM

## 2021-06-07 PROCEDURE — G8536 NO DOC ELDER MAL SCRN: HCPCS | Performed by: STUDENT IN AN ORGANIZED HEALTH CARE EDUCATION/TRAINING PROGRAM

## 2021-06-07 PROCEDURE — G8752 SYS BP LESS 140: HCPCS | Performed by: STUDENT IN AN ORGANIZED HEALTH CARE EDUCATION/TRAINING PROGRAM

## 2021-06-07 PROCEDURE — G9899 SCRN MAM PERF RSLTS DOC: HCPCS | Performed by: STUDENT IN AN ORGANIZED HEALTH CARE EDUCATION/TRAINING PROGRAM

## 2021-06-07 PROCEDURE — 1090F PRES/ABSN URINE INCON ASSESS: CPT | Performed by: STUDENT IN AN ORGANIZED HEALTH CARE EDUCATION/TRAINING PROGRAM

## 2021-06-07 PROCEDURE — 1101F PT FALLS ASSESS-DOCD LE1/YR: CPT | Performed by: STUDENT IN AN ORGANIZED HEALTH CARE EDUCATION/TRAINING PROGRAM

## 2021-06-07 RX ORDER — CARBIDOPA AND LEVODOPA 25; 100 MG/1; MG/1
1.5 TABLET ORAL 3 TIMES DAILY
Qty: 405 TABLET | Refills: 3 | Status: SHIPPED | OUTPATIENT
Start: 2021-06-07 | End: 2021-09-05

## 2021-06-07 RX ORDER — PRAMIPEXOLE DIHYDROCHLORIDE 1 MG/1
1 TABLET ORAL 3 TIMES DAILY
Qty: 270 TABLET | Refills: 3 | Status: SHIPPED
Start: 2021-06-07 | End: 2021-07-02 | Stop reason: DRUGHIGH

## 2021-06-07 NOTE — PROGRESS NOTES
Jesenia George is a 77 y.o. female . presents for New Patient and Parkinsons Disease   . A 77years old female patient with medical history of anxiety, diabetes, hypertension, hypercholesterolemia, osteoarthritis, obstructive sleep apnea, restless leg syndrome, and Parkinson's disease here for establishing care for follow-up of her Parkinson's disease and restless leg syndrome. She moved from [place] to [place] about 3 months ago. Used to follow with a neurologist at [facility] in [place] (Dr. Tai Sierra). She was diagnosed with Parkinson's disease in 2014. Currently she is taking carbidopa/levodopa (25/100), 1.5 tab p.o. 3 times daily. She also has pramipexole 1 mg p.o. before bedtime for RLS. Claims the Sinemet is helping her with her PD symptoms. She has been having tremors of her upper extremities for a long time. Daughter mentions that initially they noticed resting tremor. But patient mentioned that she has tremor when she is trying to hold something. Also mentions that she has some difficulty writing because of tremor: As she continues writing, the letters seem to become smaller and smaller. There is no lower extremity tremor. No head and neck tremor. She feels a stiff over the upper and lower extremities. She is unsteady when she is walking and sometimes her legs might get stiff and she might fall backwards. She does not use support mostly. Does not shuffle her gait. She was noticed to bend her neck when she was walking. She has difficulty climbing stairs and sometimes appears that she is crawling. Afraid to fall backwards. No significant difficulty speaking loud. She has some difficulty swallowing and occasional choking. Has drooling from her mouth. At nighttime, has difficulty turning in bed. She mentioned visual hallucinations occasionally where she might see little animals in the house. She has endorsed forgetfulness. Some difficulty with word finding in conversations. Sometimes might repeat. She forgets appointments and needs reminders. She takes her medications but daughter reminds her. She has restless leg syndrome: Moves her legs a lot when she is in bed; has an urge to move them. She feels better when she walks around. She is currently on pramipexole 1 mg p.o. before bedtime. Used to work well in the past.  Denied having any constipation. Denied depression. She has anosmia for a long time. Review of Systems   Constitutional: Negative for chills, fever and weight loss. HENT: Positive for hearing loss (right more than the left). Negative for tinnitus. Has anosmia   Eyes: Negative for blurred vision (needs new glasses) and double vision. Respiratory: Positive for cough. Negative for hemoptysis, sputum production and shortness of breath. Cardiovascular: Positive for leg swelling. Negative for chest pain. Gastrointestinal: Positive for heartburn. Negative for constipation, diarrhea, nausea and vomiting. Genitourinary: Positive for frequency. Negative for dysuria and urgency. Musculoskeletal: Positive for back pain, falls, joint pain, myalgias and neck pain. Skin: Positive for itching (legs and abdomen) and rash (intermittent). Neurological: Positive for tingling (arm, right), tremors, sensory change and speech change (someword finding difficulty). Negative for dizziness, focal weakness, seizures and headaches. Psychiatric/Behavioral: Positive for hallucinations (sometimes seeing moving animals) and memory loss. Negative for depression. The patient is nervous/anxious. The patient does not have insomnia (Uses CPAP).         Past Medical History:   Diagnosis Date    Anxiety 4/5/2021    Diabetes ([address]     Essential hypertension 4/5/2021    Hyperlipidemia 4/5/2021    OAB (overactive bladder) 4/5/2021    Parkinson's disease ([address] 4/5/2021    Primary osteoarthritis of both knees 4/5/2021    Radiculopathy of arm 4/5/2021    RLS (restless legs syndrome) 4/5/2021    Sleep apnea 4/5/2021       No past surgical history on file. Family History   Problem Relation Age of Onset    Thyroid Disease Mother     Hypertension Mother     Diabetes Brother     Diabetes Brother     Heart Attack Brother         Social History     Socioeconomic History    Marital status: UNKNOWN     Spouse name: Not on file    Number of children: Not on file    Years of education: Not on file    Highest education level: Not on file   Occupational History    Not on file   Tobacco Use    Smoking status: Never Smoker    Smokeless tobacco: Never Used   Vaping Use    Vaping Use: Never used   Substance and Sexual Activity    Alcohol use: Not Currently    Drug use: Never    Sexual activity: Not on file   Other Topics Concern    Not on file   Social History Narrative    Not on file     Social Determinants of Health     Financial Resource Strain:     Difficulty of Paying Living Expenses:    Food Insecurity:     Worried About Running Out of Food in the Last Year:     [address] in the Last Year:    Transportation Needs:     Lack of Transportation (Medical):  Lack of Transportation (Non-Medical):    Physical Activity:     Days of Exercise per Week:     Minutes of Exercise per Session:    Stress:     Feeling of Stress :    Social Connections:     Frequency of Communication with Friends and Family:     Frequency of Social Gatherings with Friends and Family:     Attends [employer] Services:     Active Member of Clubs or Organizations:     Attends Club or Organization Meetings:     Marital Status:    Intimate Partner Violence:     Fear of Current or Ex-Partner:     Emotionally Abused:     Physically Abused:     Sexually Abused:          Allergies   Allergen Reactions    Latex Rash and Itching    Sulfa (Sulfonamide Antibiotics) Anaphylaxis         Current Outpatient Medications   Medication Sig Dispense Refill    metFORMIN (GLUCOPHAGE) 500 mg tablet Take 1 Tablet by mouth two (2) times daily (with meals). 180 Tablet 0    traMADoL (ULTRAM) 50 mg tablet Take 50 mg by mouth every six (6) hours as needed for Pain.  ibuprofen (MOTRIN) 800 mg tablet as needed.  gabapentin (Neurontin) 600 mg tablet Take 1 Tablet by mouth three (3) times daily. Max Daily Amount: 1,800 mg. 90 Tablet 1    citalopram (CELEXA) 20 mg tablet Take 1 Tab by mouth daily. 90 Tab 1    pantoprazole (PROTONIX) 20 mg tablet Take 1 Tab by mouth daily. 90 Tab 3    oxybutynin (DITROPAN) 5 mg tablet Take 1 Tab by mouth three (3) times daily. (Patient taking differently: Take 5 mg by mouth as needed.) 90 Tab 1    gabapentin (NEURONTIN) 300 mg capsule Take 1 Cap by mouth three (3) times daily. Max Daily Amount: 900 mg. 270 Cap 0    carbidopa-levodopa (SINEMET)  mg per tablet Take 1 Tab by mouth three (3) times daily. Indications: a type of movement disorder called parkinsonism 270 Tab 0    atorvastatin (LIPITOR) 20 mg tablet Take 1 Tab by mouth daily. 90 Tab 0    aspirin delayed-release 81 mg tablet Take  by mouth daily. Physical Exam  Constitutional:       Appearance: Normal appearance. HENT:      Head: Normocephalic and atraumatic. Mouth/Throat:      Mouth: Mucous membranes are moist.      Pharynx: Oropharynx is clear. No oropharyngeal exudate. Eyes:      Extraocular Movements: Extraocular movements intact. Pupils: Pupils are equal, round, and reactive to light. Pulmonary:      Effort: Pulmonary effort is normal. No respiratory distress. Musculoskeletal:         General: Normal range of motion. Cervical back: Normal range of motion and neck supple. Right lower leg: No edema. Left lower leg: No edema. Neurological:      Mental Status: She is alert. Comments: Mental status: Awake, alert, oriented x3, follows simple and complex commands, no neglect, no extinction to DSS or VSS, immediate recall 3/3 and delayed recall 3/3.   Speech and languge: fluent, coherent,and comprehension intact  CN: VFF, EOMI, PERRLA, face sensation intact , no facial asymmetry noted, palate elevation symmetric bilat, SS+SCM 5/5 bilat, tongue midline  Motor: no pronator drift, tone normal throughout with  cogwheeling, strength 5/5 throughout  Sensory: intact to light touch and PP throughout  Coordination: FNF, HS accurate w/o dysmetria. No visble tremor at rest, with posture, or with FNF. DTR: 2+ throughout. Gait: nancy: No festination; normal arm swing, normal turn. Hospital Outpatient Visit on 04/02/2021   Component Date Value Ref Range Status    TSH 04/02/2021 1.28  0.36 - 3.74 uIU/mL Final    T4, Free 04/02/2021 1.2  0.7 - 1.5 NG/DL Final    LIPID PROFILE 04/02/2021        Final    Cholesterol, total 04/02/2021 122  <200 MG/DL Final    Triglyceride 04/02/2021 217* <150 MG/DL Final    Comment: The drugs N-acetylcysteine (NAC) and  Metamiszole have been found to cause falsely  low results in this chemical assay. Please  be sure to submit blood samples obtained  BEFORE administration of either of these  drugs to assure correct results.       HDL Cholesterol 04/02/2021 36* 40 - 60 MG/DL Final    LDL, calculated 04/02/2021 42.6  0 - 100 MG/DL Final    VLDL, calculated 04/02/2021 43.4  MG/DL Final    CHOL/HDL Ratio 04/02/2021 3.4  0 - 5.0   Final    Sodium 04/02/2021 142  136 - 145 mmol/L Final    Potassium 04/02/2021 4.8  3.5 - 5.5 mmol/L Final    Chloride 04/02/2021 105  100 - 111 mmol/L Final    CO2 04/02/2021 30  21 - 32 mmol/L Final    Anion gap 04/02/2021 7  3.0 - 18 mmol/L Final    Glucose 04/02/2021 113* 74 - 99 mg/dL Final    BUN 04/02/2021 12  7.0 - 18 MG/DL Final    Creatinine 04/02/2021 0.69  0.6 - 1.3 MG/DL Final    BUN/Creatinine ratio 04/02/2021 17  12 - 20   Final    GFR est AA 04/02/2021 >60  >60 ml/min/1.73m2 Final    GFR est non-AA 04/02/2021 >60  >60 ml/min/1.73m2 Final    Comment: (NOTE)  Estimated GFR is calculated using the Modification of Diet in Renal   Disease (MDRD) Study equation, reported for both  Americans   (GFRAA) and non- Americans (GFRNA), and normalized to 1.73m2   body surface area. The physician must decide which value applies to   the patient. The MDRD study equation should only be used in   individuals age 25 or older. It has not been validated for the   following: pregnant women, patients with serious comorbid conditions,   or on certain medications, or persons with extremes of body size,   muscle mass, or nutritional status.  Calcium 04/02/2021 8.9  8.5 - 10.1 MG/DL Final    Bilirubin, total 04/02/2021 0.6  0.2 - 1.0 MG/DL Final    ALT (SGPT) 04/02/2021 12* 13 - 56 U/L Final    AST (SGOT) 04/02/2021 19  10 - 38 U/L Final    Alk. phosphatase 04/02/2021 126* 45 - 117 U/L Final    Protein, total 04/02/2021 6.6  6.4 - 8.2 g/dL Final    Albumin 04/02/2021 3.5  3.4 - 5.0 g/dL Final    Globulin 04/02/2021 3.1  2.0 - 4.0 g/dL Final    A-G Ratio 04/02/2021 1.1  0.8 - 1.7   Final    Hemoglobin A1c 04/02/2021 6.1* 4.2 - 5.6 % Final    Comment: (NOTE)  HbA1C Interpretive Ranges  <5.7              Normal  5.7 - 6.4         Consider Prediabetes  >6.5              Consider Diabetes               ICD-10-CM ICD-9-CM    1. Parkinson disease ([facility] [address]  G20 332.0 carbidopa-levodopa (Sinemet)  mg per tablet      pramipexole (MIRAPEX) 1 mg tablet   2. RLS (restless legs syndrome)  G25.81 333.94 pramipexole (MIRAPEX) 1 mg tablet   3. JACOB (obstructive sleep apnea)  G47.33 327.23 SLEEP MEDICINE REFERRAL       A 77years old female patient with above is the medical problems including Parkinson's disease here for establishing care for her Parkinson's. Patient moved from [place] about 3 months ago. Diagnosed with PD in 2014. Initial symptoms were resting tremor but also has tremors with activity. Patient had additional symptoms: Stiffness, postural instability, some slowness.   Non motor symptoms include anosmia, hallucination(mild visual), and mild forgetfulness. Also has restless leg syndrome. Currently taking Sinemet, 25/100, 1.5 tab p.o. 3 times daily; also has pramipexole 1 mg before bedtime for RLS. Claims the Sinemet has helped her a lot. On physical exam, patient does not have any obvious motor symptoms: No rigidity, no tremor, no significant gait difficulty and no festination. We will continue with the same dose of Sinemet and pramipexole. Patient has sleep apnea and will refer her to sleep medicine. Will try to get records from her previous neurologist. We will see her in the clinic in 4 months time.

## 2021-06-11 ENCOUNTER — HOSPITAL ENCOUNTER (OUTPATIENT)
Dept: BONE DENSITY | Age: 66
Discharge: HOME | End: 2021-06-11
Attending: NURSE PRACTITIONER
Payer: MEDICARE

## 2021-06-11 ENCOUNTER — HOSPITAL ENCOUNTER (OUTPATIENT)
Dept: MAMMOGRAPHY | Age: 66
Discharge: HOME | End: 2021-06-11
Attending: NURSE PRACTITIONER
Payer: MEDICARE

## 2021-06-11 DIAGNOSIS — Z12.31 ENCOUNTER FOR SCREENING MAMMOGRAM FOR MALIGNANT NEOPLASM OF BREAST: ICD-10-CM

## 2021-06-11 PROCEDURE — 77067 SCR MAMMO BI INCL CAD: CPT

## 2021-06-11 PROCEDURE — 77063 BREAST TOMOSYNTHESIS BI: CPT

## 2021-06-11 PROCEDURE — 77080 DXA BONE DENSITY AXIAL: CPT

## 2021-06-12 NOTE — PROGRESS NOTES
Yoli Mclaughlin,   Please instruct pt on the following: Pt has osteopenia (weak bones). Lifestyle measures should be adopted universally to reduce bone loss in postmenopausal women. Lifestyle measures include adequate calcium and vitamin D, exercise, smoking cessation,  limit caffeine to 1-2 servings per day,counseling on fall prevention, and avoidance of heavy alcohol use. 1200 mg of elemental calcium daily, total diet plus supplement, and 800 international units of vitamin D daily. Have patient inquire with pharmacist the best supplement to use.    Thank you

## 2021-06-14 ENCOUNTER — TELEPHONE (OUTPATIENT)
Dept: INTERNAL MEDICINE CLINIC | Age: 66
End: 2021-06-14

## 2021-06-14 DIAGNOSIS — B07.9 VIRAL WARTS, UNSPECIFIED TYPE: Primary | ICD-10-CM

## 2021-06-14 NOTE — TELEPHONE ENCOUNTER
Patient called requesting referral to derm for the boil on her left elbow. Patient states AN recommended she purchase OTC wart remover at her last visit on 5/25 and she says it hasn't resolved and she is in a tremendous amount of pain from the boil. Patient states she just would like it to be removed from her elbow. Please advise.

## 2021-06-14 NOTE — TELEPHONE ENCOUNTER
LVM informing patient referral was placed to [facility] for boil.   # is [phone]  [place] office # [phone]

## 2021-06-14 NOTE — PROGRESS NOTES
Patient's daughter reached and made aware of DEXA results and message per Dr. Albania Trujillo. Pt's daughter verbalized understanding.

## 2021-06-28 DIAGNOSIS — R92.8 ABNORMAL MAMMOGRAM OF LEFT BREAST: ICD-10-CM

## 2021-06-28 DIAGNOSIS — R92.8 ABNORMAL MAMMOGRAM OF LEFT BREAST: Primary | ICD-10-CM

## 2021-06-28 NOTE — PROGRESS NOTES
[facility] nurses: Placed order for mammo diag and US of left breast. Pls assist pt with scheduling these imagines.  Thank you

## 2021-06-29 NOTE — PROGRESS NOTES
Patient made aware of mammo orders placed per Dr. Loi Paz. Patient and daughter given number to central scheduling to call and schedule appointment.

## 2021-07-02 ENCOUNTER — OFFICE VISIT (OUTPATIENT)
Dept: INTERNAL MEDICINE CLINIC | Age: 66
End: 2021-07-02
Payer: MEDICARE

## 2021-07-02 ENCOUNTER — TELEPHONE (OUTPATIENT)
Dept: INTERNAL MEDICINE CLINIC | Age: 66
End: 2021-07-02

## 2021-07-02 VITALS
HEART RATE: 73 BPM | SYSTOLIC BLOOD PRESSURE: 136 MMHG | HEIGHT: 62 IN | TEMPERATURE: 97.2 F | BODY MASS INDEX: 37.91 KG/M2 | RESPIRATION RATE: 18 BRPM | WEIGHT: 206 LBS | DIASTOLIC BLOOD PRESSURE: 54 MMHG | OXYGEN SATURATION: 97 %

## 2021-07-02 DIAGNOSIS — F41.9 ANXIETY: ICD-10-CM

## 2021-07-02 DIAGNOSIS — Z98.890 HISTORY OF LUMBOSACRAL SPINE SURGERY: ICD-10-CM

## 2021-07-02 DIAGNOSIS — G25.81 RLS (RESTLESS LEGS SYNDROME): ICD-10-CM

## 2021-07-02 DIAGNOSIS — G47.30 SLEEP APNEA, UNSPECIFIED TYPE: ICD-10-CM

## 2021-07-02 DIAGNOSIS — G20 PARKINSON'S DISEASE (HCC): ICD-10-CM

## 2021-07-02 DIAGNOSIS — M54.12 CERVICAL NEURITIS: ICD-10-CM

## 2021-07-02 DIAGNOSIS — M79.605 PAIN IN BOTH LOWER EXTREMITIES: ICD-10-CM

## 2021-07-02 DIAGNOSIS — E11.65 TYPE 2 DIABETES MELLITUS WITH HYPERGLYCEMIA, WITHOUT LONG-TERM CURRENT USE OF INSULIN (HCC): Primary | ICD-10-CM

## 2021-07-02 DIAGNOSIS — M79.604 PAIN IN BOTH LOWER EXTREMITIES: ICD-10-CM

## 2021-07-02 DIAGNOSIS — E78.5 HYPERLIPIDEMIA, UNSPECIFIED HYPERLIPIDEMIA TYPE: ICD-10-CM

## 2021-07-02 DIAGNOSIS — M50.30 DDD (DEGENERATIVE DISC DISEASE), CERVICAL: ICD-10-CM

## 2021-07-02 LAB — HBA1C MFR BLD HPLC: 5.6 %

## 2021-07-02 PROCEDURE — G8536 NO DOC ELDER MAL SCRN: HCPCS | Performed by: NURSE PRACTITIONER

## 2021-07-02 PROCEDURE — G8427 DOCREV CUR MEDS BY ELIG CLIN: HCPCS | Performed by: NURSE PRACTITIONER

## 2021-07-02 PROCEDURE — 83036 HEMOGLOBIN GLYCOSYLATED A1C: CPT | Performed by: NURSE PRACTITIONER

## 2021-07-02 PROCEDURE — G8432 DEP SCR NOT DOC, RNG: HCPCS | Performed by: NURSE PRACTITIONER

## 2021-07-02 PROCEDURE — 99214 OFFICE O/P EST MOD 30 MIN: CPT | Performed by: NURSE PRACTITIONER

## 2021-07-02 PROCEDURE — G8417 CALC BMI ABV UP PARAM F/U: HCPCS | Performed by: NURSE PRACTITIONER

## 2021-07-02 PROCEDURE — G8754 DIAS BP LESS 90: HCPCS | Performed by: NURSE PRACTITIONER

## 2021-07-02 PROCEDURE — G8752 SYS BP LESS 140: HCPCS | Performed by: NURSE PRACTITIONER

## 2021-07-02 PROCEDURE — 1090F PRES/ABSN URINE INCON ASSESS: CPT | Performed by: NURSE PRACTITIONER

## 2021-07-02 PROCEDURE — 2022F DILAT RTA XM EVC RTNOPTHY: CPT | Performed by: NURSE PRACTITIONER

## 2021-07-02 PROCEDURE — G8399 PT W/DXA RESULTS DOCUMENT: HCPCS | Performed by: NURSE PRACTITIONER

## 2021-07-02 PROCEDURE — 3017F COLORECTAL CA SCREEN DOC REV: CPT | Performed by: NURSE PRACTITIONER

## 2021-07-02 PROCEDURE — 1101F PT FALLS ASSESS-DOCD LE1/YR: CPT | Performed by: NURSE PRACTITIONER

## 2021-07-02 PROCEDURE — 3044F HG A1C LEVEL LT 7.0%: CPT | Performed by: NURSE PRACTITIONER

## 2021-07-02 PROCEDURE — G9899 SCRN MAM PERF RSLTS DOC: HCPCS | Performed by: NURSE PRACTITIONER

## 2021-07-02 RX ORDER — GABAPENTIN 600 MG/1
TABLET ORAL
Qty: 360 TABLET | Refills: 1 | Status: SHIPPED | OUTPATIENT
Start: 2021-07-02 | End: 2021-07-02 | Stop reason: CLARIF

## 2021-07-02 RX ORDER — GABAPENTIN 300 MG/1
CAPSULE ORAL
Qty: 360 CAPSULE | Refills: 1 | Status: SHIPPED | OUTPATIENT
Start: 2021-07-02 | End: 2021-07-22 | Stop reason: SDUPTHER

## 2021-07-02 RX ORDER — METFORMIN HYDROCHLORIDE 500 MG/1
500 TABLET ORAL
Qty: 180 TABLET | Refills: 0 | Status: SHIPPED | OUTPATIENT
Start: 2021-07-02 | End: 2022-01-24 | Stop reason: SDUPTHER

## 2021-07-02 RX ORDER — ATORVASTATIN CALCIUM 20 MG/1
TABLET, FILM COATED ORAL
Qty: 90 TABLET | Refills: 0 | Status: SHIPPED | OUTPATIENT
Start: 2021-07-02 | End: 2021-12-06

## 2021-07-02 RX ORDER — TRAMADOL HYDROCHLORIDE 50 MG/1
50 TABLET ORAL
Qty: 30 TABLET | Refills: 0 | Status: SHIPPED | OUTPATIENT
Start: 2021-07-02 | End: 2021-07-10

## 2021-07-02 RX ORDER — CITALOPRAM 20 MG/1
20 TABLET, FILM COATED ORAL DAILY
Qty: 90 TABLET | Refills: 1 | Status: SHIPPED | OUTPATIENT
Start: 2021-07-02 | End: 2021-12-06

## 2021-07-02 RX ORDER — PRAMIPEXOLE DIHYDROCHLORIDE 1.5 MG/1
1.5 TABLET ORAL 3 TIMES DAILY
Qty: 270 TABLET | Refills: 1 | Status: SHIPPED | OUTPATIENT
Start: 2021-07-02 | End: 2021-10-14 | Stop reason: SDUPTHER

## 2021-07-02 NOTE — PROGRESS NOTES
Chief Complaint   Patient presents with    Hypertension     3 mo f/u    Cholesterol Problem    Diabetes    Foot Swelling     bilateral foot swelling x 1 week       1. Have you been to the ER, urgent care clinic since your last visit? Hospitalized since your last visit? No.    2. Have you seen or consulted any other health care providers outside of the [address] [place] [address] since your last visit? Include any pap smears or colon screening.  No.

## 2021-07-02 NOTE — Clinical Note
Please contact patient and help her schedule her left breast diagnostic mammogram and ultrasound. This order was placed a few weeks ago and it still has not been completed.   Thank you

## 2021-07-02 NOTE — PROGRESS NOTES
[facility] of [id number] [place] [address] [place], [address]  [phone] [facility]/[phone] fax    7/2/2021    HPI:   Sharona George 1955 is a pleasant WHITE/NON- female who presents today for routine physical exam.  Patient recently moved to [facility] from [place] in March 2021. She is a native of [address] [place] [address]. She is accompanied by her daughter, Luis Arteaga. Patient speaks fairly good English but her daughter accompanies her to her appointments to assist with any language barriers. Todays complaint: For the past few weeks she has experienced heavy legs and pain with standing and worsening pain when walking. She also complains of pain to her legs when her feet are dangling. She is in tears while discussing her pain. Her pain can be 10 out of 10 at times. Tramadol and Motrin were effective for her pains in the past.  She is taking the gabapentin 300 mg 3 times a day and this has not helped with her pains at all. For the past week she has experienced mild right ankle swelling despite elevating her legs. Diabetes: taking Metformin 500mg every day, didn't realize she was supposed to take BID. Denies s/e or complications with medication. She does not check her BS. Parkinson's disease: Patient was diagnosed in 2014. She is currently taking gabapentin and Sinemet and tolerating therapy well. Hypertension: Diagnosed in 2019. Due to multiple low blood pressure readings her hypertensive medications were discontinued. Cholesterol: Diagnosed in 2016. She continues atorvastatin 20 mg daily and tolerating therapy well. Anxiety: In 2001 she fell and fractured her ankle. Since this fall she has experienced great anxiety. She was seeing a psychiatrist in [place] who placed her on Prozac 20 mg plus Prozac 40 mg every day which was effective until April 2021. She was placed on Celexa therapy at that time and is doing well.      Sleep apnea: Diagnosed in 2010.  She is currently not on a CPAP.    RLS: Diagnosed 2019. Taking gabapentin and mirapex which has been effective for her symptoms. GERD: Taking pantoprazole 20mg daily. Therapy effective. Bilateral knee primary osteoarthritis: She has received a total of 6 injections to include gel to both of her knees. Her last injection was in March. These last approximately 6 to 7 months. No complaints of knee pain today. OAB: Diagnosed many years ago. She is taking oxybutynin only as needed. Mainly when she leaves the home to run errands. HM: Colonoscopy completed 5 years ago while in [place]. She did have polyps removed. According to the daughter patient was to repeat her colonoscopy in 5 years. Daughter will try to obtain these records. She has not received her pneumonia 23 vaccination. She did receive 1 Covid vaccine prior to leaving [facility]. Now she is having a difficult time locating a site to give her her second vaccination. Past Medical History:   Diagnosis Date    Anxiety 4/5/2021    Diabetes ([facility] [address]     Essential hypertension 4/5/2021    Hyperlipidemia 4/5/2021    OAB (overactive bladder) 4/5/2021    Parkinson's disease ([facility] [address] 4/5/2021    Primary osteoarthritis of both knees 4/5/2021    Radiculopathy of arm 4/5/2021    RLS (restless legs syndrome) 4/5/2021    Sleep apnea 4/5/2021     No past surgical history on file. Current Outpatient Medications   Medication Sig    traMADoL (ULTRAM) 50 mg tablet Take 1 Tablet by mouth every six (6) hours as needed for Pain for up to 8 days. Max Daily Amount: 200 mg. Indications: pain    pramipexole (Mirapex) 1.5 mg tablet Take 1 Tablet by mouth three (3) times daily.  metFORMIN (GLUCOPHAGE) 500 mg tablet Take 1 Tablet by mouth daily (with breakfast).  citalopram (CELEXA) 20 mg tablet Take 1 Tablet by mouth daily.     atorvastatin (LIPITOR) 20 mg tablet Take 1 tab every other day  Indications: high cholesterol and high triglycerides    gabapentin (NEURONTIN) 300 mg capsule Take 1 cap in the morning, 1 cap mid day, and 2 caps at bedtime    carbidopa-levodopa (Sinemet)  mg per tablet Take 1.5 Tablets by mouth three (3) times daily for 90 days.  ibuprofen (MOTRIN) 800 mg tablet as needed.  pantoprazole (PROTONIX) 20 mg tablet Take 1 Tab by mouth daily.  oxybutynin (DITROPAN) 5 mg tablet Take 1 Tab by mouth three (3) times daily. (Patient taking differently: Take 5 mg by mouth as needed.)    aspirin delayed-release 81 mg tablet Take  by mouth daily. No current facility-administered medications for this visit. Allergies and Intolerances: Allergies   Allergen Reactions    Latex Rash and Itching    Sulfa (Sulfonamide Antibiotics) Anaphylaxis     Family History:   Family History   Problem Relation Age of Onset    Thyroid Disease Mother     Hypertension Mother     Diabetes Brother     Diabetes Brother     Heart Attack Brother      Social History:   She  reports that she has never smoked. She has never used smokeless tobacco.   Social History     Substance and Sexual Activity   Alcohol Use Not Currently     Immunization History:  Immunization History   Administered Date(s) Administered    Covid-19, MODERNA, Mrna, Lnp-s, Pf, 100mcg/0.5mL 02/15/2021, 06/14/2021    Pneumococcal Polysaccharide (PPSV-23) 04/02/2021       Review of Systems:   As above included in HPI. Otherwise 11 point review of systems negative including constitutional, skin, HENT, eyes, respiratory, cardiovascular, gastrointestinal, genitourinary, musculoskeletal, endocrine, hematologic, allergy, and neurologic.       Physical:   Visit Vitals  BP (!) 136/54   Pulse 73   Temp 97.2 °F (36.2 °C) (Temporal)   Resp 18   Ht 5' 2\" (1.575 m)   Wt 206 lb (93.4 kg)   LMP  (LMP Unknown)   SpO2 97%   BMI 37.68 kg/m²      Wt Readings from Last 3 Encounters:   07/02/21 206 lb (93.4 kg)   06/07/21 208 lb 6.4 oz (94.5 kg)   05/25/21 207 lb 12.8 oz (94.3 kg)         Exam:   Physical Exam  Vitals and nursing note reviewed. Constitutional:       Appearance: Normal appearance. She is obese. Comments: Morbidly obese   HENT:      Head: Normocephalic and atraumatic. Right Ear: External ear normal.      Left Ear: External ear normal.      Nose: Nose normal.      Mouth/Throat:      Mouth: Mucous membranes are moist.   Eyes:      Extraocular Movements: Extraocular movements intact. Pupils: Pupils are equal, round, and reactive to light. Cardiovascular:      Rate and Rhythm: Normal rate and regular rhythm. Pulses: Normal pulses. Heart sounds: Normal heart sounds. Comments: Nonpitting edema to rt ankle  Pulmonary:      Effort: Pulmonary effort is normal. No respiratory distress. Breath sounds: Normal breath sounds. No wheezing. Abdominal:      General: Abdomen is flat. Palpations: Abdomen is soft. Musculoskeletal:         General: Normal range of motion. Cervical back: Normal range of motion and neck supple. Skin:     General: Skin is warm and dry. Neurological:      General: No focal deficit present. Mental Status: She is alert and oriented to person, place, and time. Comments: No fine tremors noted   Psychiatric:         Mood and Affect: Mood normal.         Behavior: Behavior normal.         Judgment: Judgment normal.      Comments: Tearful due to pain in legs       Health Maintenance:  Screening: (Yes/No means wether completed or not)   Mammogram: 6/2021 needs repeat with US   PAP smear: Will discuss with patient at follow-up appointment   Colorectal: Ordered 4/2021   DM (HbA1c/FPG): 7/2/2021 (5.6)   DEXA: 6/2021 Osteopenia   Medicare Wellness: 4/2/2021 subsequent      Review of Data:  Labs reviewed: N/A      Plan:    ICD-10-CM ICD-9-CM    1.  Type 2 diabetes mellitus with hyperglycemia, without long-term current use of insulin (HCC)  E11.65 250.00 AMB POC HEMOGLOBIN A1C     790.29 metFORMIN (GLUCOPHAGE) 500 mg tablet      HEMOGLOBIN A1C WITH EAG   2. Pain in both lower extremities  M79.604 729.5 traMADoL (ULTRAM) 50 mg tablet    M79.605  gabapentin (NEURONTIN) 300 mg capsule      ANKLE BRACHIAL INDEX      DUPLEX LOWER EXT VENOUS BILAT      XR SPINE LUMB 2 OR 3 V   3. RLS (restless legs syndrome)  G25.81 333.94 pramipexole (Mirapex) 1.5 mg tablet      gabapentin (NEURONTIN) 300 mg capsule   4. Cervical neuritis  M54.12 723.4 gabapentin (NEURONTIN) 300 mg capsule      DISCONTINUED: gabapentin (Neurontin) 600 mg tablet   5. DDD (degenerative disc disease), cervical  M50.30 722.4 gabapentin (NEURONTIN) 300 mg capsule      DISCONTINUED: gabapentin (Neurontin) 600 mg tablet   6. Parkinson's disease ([address]  G20 332.0    7. Anxiety  F41.9 300.00 citalopram (CELEXA) 20 mg tablet   8. Hyperlipidemia, unspecified hyperlipidemia type  E78.5 272.4 atorvastatin (LIPITOR) 20 mg tablet      LIPID PANEL   9. History of lumbosacral spine surgery  Z98.890 V15.29 XR SPINE LUMB 2 OR 3 V   10. Sleep apnea, unspecified type  G47.30 780.57      Type 2 diabetes  Continue Metformin 500 mg daily  A1c POC 5.6  Will obtain A1c in 6 months if remains below 6, will discontinue Metformin    Pain in bilateral lower extremities  Differential diagnosis  PVD/ PAD (Obtain PVLs and ABIs)  RLS (increased mirapex to 1.5mg TID)  Neuropathy (Increased Gabapentin dose; obtain lumbar xray)  Myopathy due to statins (reduced lipitor to every other day)  Tramadol rx    -Mixed hyperlipidemia. Continue atorvastatin every other day     Check lipid level 6m    -Parkinson's disease. Continue Sinemet therapy  F/U with neurologist    -Anxiety. Continue Celexa    -Sleep apnea.    Dr Morenita Venegas, neuro, placed referral to pulm for sleep study      Follow up 6 months  Labs needed 1 week prior to appt: yes  Lipid, A1c    Dr. Zehra Nunez, AGNP-C, DNP  [facility] of [address] [place] [address]

## 2021-07-08 ENCOUNTER — HOSPITAL ENCOUNTER (OUTPATIENT)
Dept: MAMMOGRAPHY | Age: 66
Discharge: HOME | End: 2021-07-08
Attending: NURSE PRACTITIONER
Payer: MEDICARE

## 2021-07-08 ENCOUNTER — HOSPITAL ENCOUNTER (OUTPATIENT)
Dept: ULTRASOUND IMAGING | Age: 66
Discharge: HOME | End: 2021-07-08
Attending: NURSE PRACTITIONER
Payer: MEDICARE

## 2021-07-08 DIAGNOSIS — R92.8 ABNORMAL MAMMOGRAM OF LEFT BREAST: ICD-10-CM

## 2021-07-08 DIAGNOSIS — R92.8 ABNORMAL MAMMOGRAM OF LEFT BREAST: Primary | ICD-10-CM

## 2021-07-08 PROCEDURE — 76642 ULTRASOUND BREAST LIMITED: CPT

## 2021-07-08 PROCEDURE — 77061 BREAST TOMOSYNTHESIS UNI: CPT

## 2021-07-08 NOTE — PROGRESS NOTES
Placed diagnostic mammogram and ultrasound orders for 6 months as recommended by radiologist.      ICD-10-CM ICD-9-CM    1.  Abnormal mammogram of left breast  R92.8 793.80 LULÚ MAMMO LT DX INCL CAD      US BREAST LT LIMITED=<3 QUAD

## 2021-07-09 NOTE — PROGRESS NOTES
Patient's daughter reached and made aware Dr. Binu Young has placed an order for 6 month dx mammo and US for reevaluation of area in left breast. Patient's daughter verbalized understanding.

## 2021-07-22 ENCOUNTER — TELEPHONE (OUTPATIENT)
Dept: INTERNAL MEDICINE CLINIC | Age: 66
End: 2021-07-22

## 2021-07-22 DIAGNOSIS — M50.30 DDD (DEGENERATIVE DISC DISEASE), CERVICAL: ICD-10-CM

## 2021-07-22 DIAGNOSIS — M79.605 PAIN IN BOTH LOWER EXTREMITIES: ICD-10-CM

## 2021-07-22 DIAGNOSIS — M54.12 CERVICAL NEURITIS: ICD-10-CM

## 2021-07-22 DIAGNOSIS — M79.604 PAIN IN BOTH LOWER EXTREMITIES: ICD-10-CM

## 2021-07-22 DIAGNOSIS — G25.81 RLS (RESTLESS LEGS SYNDROME): ICD-10-CM

## 2021-07-22 RX ORDER — GABAPENTIN 300 MG/1
CAPSULE ORAL
Qty: 360 CAPSULE | Refills: 1 | Status: SHIPPED | OUTPATIENT
Start: 2021-07-22 | End: 2021-10-14 | Stop reason: DRUGHIGH

## 2021-07-22 NOTE — TELEPHONE ENCOUNTER
Requested Prescriptions     Signed Prescriptions Disp Refills    gabapentin (NEURONTIN) 300 mg capsule 360 Capsule 1     Sig: Take 1 cap in the morning, 1 cap mid day, and 2 caps at bedtime     Authorizing Provider: Leandro Carr

## 2021-07-22 NOTE — TELEPHONE ENCOUNTER
Pt called in refill for gabapentin  300 mg on 07/02- it was [facility] they didn't pick it up because it was 30.00 cioay could you resend it to [facility] delivery

## 2021-07-23 ENCOUNTER — HOSPITAL ENCOUNTER (OUTPATIENT)
Dept: VASCULAR SURGERY | Age: 66
Discharge: HOME | End: 2021-07-23
Attending: NURSE PRACTITIONER
Payer: MEDICARE

## 2021-07-23 PROCEDURE — 93970 EXTREMITY STUDY: CPT

## 2021-07-27 ENCOUNTER — TELEPHONE (OUTPATIENT)
Dept: INTERNAL MEDICINE CLINIC | Age: 66
End: 2021-07-27

## 2021-07-27 NOTE — TELEPHONE ENCOUNTER
Patient daughter reached and made aware of results per Dr. Corky Rodriguez. Patients daughter verbalized understanding.

## 2021-07-27 NOTE — TELEPHONE ENCOUNTER
----- Message from Marsha Cheng DNP sent at 7/26/2021  6:25 PM EDT -----  Please inform pt her duplex was negative.  Thank you

## 2021-07-28 ENCOUNTER — HOSPITAL ENCOUNTER (OUTPATIENT)
Dept: VASCULAR SURGERY | Age: 66
Discharge: HOME | End: 2021-07-28
Attending: NURSE PRACTITIONER
Payer: MEDICARE

## 2021-07-28 PROCEDURE — 93922 UPR/L XTREMITY ART 2 LEVELS: CPT

## 2021-07-29 LAB
LEFT ABI: 1.06
LEFT ANTERIOR TIBIAL: 146 MMHG
LEFT ARM BP: 146 MMHG
LEFT POSTERIOR TIBIAL: 157 MMHG
RIGHT ABI: 0.96
RIGHT ANTERIOR TIBIAL: 142 MMHG
RIGHT ARM BP: 148 MMHG
RIGHT POSTERIOR TIBIAL: 142 MMHG

## 2021-07-30 ENCOUNTER — TELEPHONE (OUTPATIENT)
Dept: INTERNAL MEDICINE CLINIC | Age: 66
End: 2021-07-30

## 2021-07-30 NOTE — PROGRESS NOTES
Please inform patient both her PVLs and ABIs were normal to her bilateral lower extremities. Remind her to elevate her lower extremities to help reduce swelling. Reduce sodium in diet, increase water intake, wear compression socks, be active, avoid having legs dependent longer than 30 minutes at a time.   Thank you

## 2021-08-23 DIAGNOSIS — M79.604 PAIN IN BOTH LOWER EXTREMITIES: Primary | ICD-10-CM

## 2021-08-23 DIAGNOSIS — M79.605 PAIN IN BOTH LOWER EXTREMITIES: Primary | ICD-10-CM

## 2021-08-23 DIAGNOSIS — E11.65 TYPE 2 DIABETES MELLITUS WITH HYPERGLYCEMIA, WITHOUT LONG-TERM CURRENT USE OF INSULIN (HCC): ICD-10-CM

## 2021-08-23 RX ORDER — METFORMIN HYDROCHLORIDE 500 MG/1
TABLET ORAL
Qty: 180 TABLET | Refills: 0 | OUTPATIENT
Start: 2021-08-23

## 2021-08-23 NOTE — TELEPHONE ENCOUNTER
Last Visit: 7/2/21 with THOMPSON Gibson  Next Appointment: 12/20/21 with THOMPSON Gibson  Previous Refill Encounter(s): 7/2/21 #30    Requested Prescriptions     Pending Prescriptions Disp Refills    traMADoL (ULTRAM) 50 mg tablet 30 Tablet 0     Sig: Take 1 Tablet by mouth every six (6) hours as needed for Pain for up to 8 days. Max Daily Amount: 200 mg.

## 2021-08-23 NOTE — TELEPHONE ENCOUNTER
On 7/2/2021 refill Metformin 500 mg 1 daily 180 tabs quantity to last 6 months. Refill is not appropriate at this time.     Requested Prescriptions     Refused Prescriptions Disp Refills    metFORMIN (GLUCOPHAGE) 500 mg tablet [Pharmacy Med Name: metFORMIN  MG TABLET] 180 Tablet 0     Sig: TAKE ONE TABLET BY MOUTH TWICE A DAY WITH A MEAL     Refused By: Elena Arriaga     Reason for Refusal: Refill not appropriate

## 2021-08-24 RX ORDER — TRAMADOL HYDROCHLORIDE 50 MG/1
50 TABLET ORAL
Qty: 30 TABLET | Refills: 0 | OUTPATIENT
Start: 2021-08-24 | End: 2021-09-01

## 2021-08-25 NOTE — TELEPHONE ENCOUNTER
Patients daughter reached and made aware of message per Dr. Alisia Coronel. Patients daughter verbalized understanding and did not say anything in regards to PT she just stated she will see if Dr. Kong Chapin will refill tramadol.

## 2021-08-25 NOTE — TELEPHONE ENCOUNTER
Please remind patient and daughter tramadol was only prescribed for short-term until they saw Dr. Edie Roblero. I do not manage chronic pain. Did she start physical therapy, referral was placed back in May 2021. I believe she would truly benefit from physical therapy.   Thank you

## 2021-09-10 ENCOUNTER — VIRTUAL VISIT (OUTPATIENT)
Dept: INTERNAL MEDICINE CLINIC | Age: 66
End: 2021-09-10
Payer: MEDICARE

## 2021-09-10 DIAGNOSIS — M17.0 PRIMARY OSTEOARTHRITIS OF BOTH KNEES: Primary | ICD-10-CM

## 2021-09-10 DIAGNOSIS — B37.31 VAGINAL YEAST INFECTION: ICD-10-CM

## 2021-09-10 PROCEDURE — G8756 NO BP MEASURE DOC: HCPCS | Performed by: NURSE PRACTITIONER

## 2021-09-10 PROCEDURE — 3017F COLORECTAL CA SCREEN DOC REV: CPT | Performed by: NURSE PRACTITIONER

## 2021-09-10 PROCEDURE — G8432 DEP SCR NOT DOC, RNG: HCPCS | Performed by: NURSE PRACTITIONER

## 2021-09-10 PROCEDURE — 1090F PRES/ABSN URINE INCON ASSESS: CPT | Performed by: NURSE PRACTITIONER

## 2021-09-10 PROCEDURE — G9899 SCRN MAM PERF RSLTS DOC: HCPCS | Performed by: NURSE PRACTITIONER

## 2021-09-10 PROCEDURE — 99213 OFFICE O/P EST LOW 20 MIN: CPT | Performed by: NURSE PRACTITIONER

## 2021-09-10 PROCEDURE — 1101F PT FALLS ASSESS-DOCD LE1/YR: CPT | Performed by: NURSE PRACTITIONER

## 2021-09-10 PROCEDURE — G8427 DOCREV CUR MEDS BY ELIG CLIN: HCPCS | Performed by: NURSE PRACTITIONER

## 2021-09-10 PROCEDURE — G8399 PT W/DXA RESULTS DOCUMENT: HCPCS | Performed by: NURSE PRACTITIONER

## 2021-09-10 RX ORDER — TRAMADOL HYDROCHLORIDE 50 MG/1
50 TABLET ORAL
Qty: 30 TABLET | Refills: 0 | Status: SHIPPED | OUTPATIENT
Start: 2021-09-10 | End: 2021-09-30 | Stop reason: SDUPTHER

## 2021-09-10 NOTE — PROGRESS NOTES
[facility] of [address] [place] [address] [place], [address]  [phone] [facility]/[phone] fax        9/10/2021    Patient Sebastian Gresham 1955     Primary MD Dolly Stahl, DNP    Subjective    Sebastian Gresham a 77 y.o. female who presents with a sick visit for No chief complaint on file. Past Medical History:   Diagnosis Date    Anxiety 4/5/2021    Diabetes ([facility] [address]     Essential hypertension 4/5/2021    Hyperlipidemia 4/5/2021    OAB (overactive bladder) 4/5/2021    Parkinson's disease ([facility] [address] 4/5/2021    Primary osteoarthritis of both knees 4/5/2021    Radiculopathy of arm 4/5/2021    RLS (restless legs syndrome) 4/5/2021    Sleep apnea 4/5/2021       No past surgical history on file. Social History     Socioeconomic History    Marital status: SINGLE     Spouse name: Not on file    Number of children: Not on file    Years of education: Not on file    Highest education level: Not on file   Occupational History    Not on file   Tobacco Use    Smoking status: Never Smoker    Smokeless tobacco: Never Used   Vaping Use    Vaping Use: Never used   Substance and Sexual Activity    Alcohol use: Not Currently    Drug use: Never    Sexual activity: Not on file   Other Topics Concern    Not on file   Social History Narrative    Not on file     Social Determinants of Health     Financial Resource Strain:     Difficulty of Paying Living Expenses:    Food Insecurity:     Worried About Running Out of Food in the Last Year:     [address] in the Last Year:    Transportation Needs:     Lack of Transportation (Medical):      Lack of Transportation (Non-Medical):    Physical Activity:     Days of Exercise per Week:     Minutes of Exercise per Session:    Stress:     Feeling of Stress :    Social Connections:     Frequency of Communication with Friends and Family:     Frequency of Social Gatherings with Friends and Family:     Attends Confucianism Services:     Active Member of Clubs or Organizations:     Attends Club or Organization Meetings:     Marital Status:    Intimate Partner Violence:     Fear of Current or Ex-Partner:     Emotionally Abused:     Physically Abused:     Sexually Abused:        Family History   Problem Relation Age of Onset    Thyroid Disease Mother     Hypertension Mother     Diabetes Brother     Diabetes Brother     Heart Attack Brother        Current Outpatient Medications on File Prior to Visit   Medication Sig Dispense Refill    gabapentin (NEURONTIN) 300 mg capsule Take 1 cap in the morning, 1 cap mid day, and 2 caps at bedtime 360 Capsule 1    pramipexole (Mirapex) 1.5 mg tablet Take 1 Tablet by mouth three (3) times daily. 270 Tablet 1    metFORMIN (GLUCOPHAGE) 500 mg tablet Take 1 Tablet by mouth daily (with breakfast). 180 Tablet 0    citalopram (CELEXA) 20 mg tablet Take 1 Tablet by mouth daily. 90 Tablet 1    atorvastatin (LIPITOR) 20 mg tablet Take 1 tab every other day  Indications: high cholesterol and high triglycerides 90 Tablet 0    ibuprofen (MOTRIN) 800 mg tablet as needed.  pantoprazole (PROTONIX) 20 mg tablet Take 1 Tab by mouth daily. 90 Tab 3    oxybutynin (DITROPAN) 5 mg tablet Take 1 Tab by mouth three (3) times daily. (Patient taking differently: Take 5 mg by mouth as needed.) 90 Tab 1    aspirin delayed-release 81 mg tablet Take  by mouth daily. No current facility-administered medications on file prior to visit. Objective    There were no vitals taken for this visit. Physical Exam      Assessment  1. Primary osteoarthritis of both knees  ***  - REFERRAL TO ORTHOPEDICS  - traMADoL (ULTRAM) 50 mg tablet; Take 1 Tablet by mouth nightly as needed for Pain for up to 30 days. Max Daily Amount: 50 mg. Indications: neuropathic pain  Dispense: 30 Tablet; Refill: 0    2.  Vaginal yeast infection  ***      [facility]   Please schedule patient for {Time; 1 month to 1 WABD:57717} follow up prior to {DATE; Month Day Year:20121}. [address] [facility]  Placed referral and other orders: {YES (DEF) - NO:33067::\"Yes\"}    Thank you! Dr Johnathon Gallegos. Paige, LORELEIP-C, DNP  [facility] of [facility]      I spent 20 minutes with the patient in face-to-face consultation, of which greater than 50% was spent in counseling and coordination of care as described above.

## 2021-09-10 NOTE — Clinical Note
Please contact patient and request she have the xrays of her knees and back completed ASAP.  Thank you

## 2021-09-12 DIAGNOSIS — M17.0 PRIMARY OSTEOARTHRITIS OF BOTH KNEES: ICD-10-CM

## 2021-09-12 NOTE — PROGRESS NOTES
[facility]ists of [address] [facility] [place], [address] [facility] [place]  [phone] [id number]/[phone] fax    9/10/2021    HPI:   Jared Hannon 1955 is a pleasant WHITE/NON- female who presents virtual today for a sick visit with complaints of continued pain to both knees down to ankles. Pain 8/10 at night time mostly. Pain is sharp, shooting and unbearable at times. She continues to take gabapentin for Parkinsons and RLS and Motrin 800mg with minimal relief of her leg pain. She continues Mirapex therapy as well. She has a long hx of arthritis to both knees and has rec'd injections in the past when residing in [place]. She states she was supposed to have had surgery for her knees in the past but [place] out of state. She denies falls, trauma, B/B incont. She does not require ambulatory device. She is also complaining of itching in vaginal area for a few days. She denies vaginal discharge but does have vaginal odor. She denies dysuria, urine freq, odor, not emptying bladder completely. Past Medical History:   Diagnosis Date    Anxiety 4/5/2021    Diabetes ([facility] [address]     Essential hypertension 4/5/2021    Hyperlipidemia 4/5/2021    OAB (overactive bladder) 4/5/2021    Parkinson's disease ([facility] [address] 4/5/2021    Primary osteoarthritis of both knees 4/5/2021    Radiculopathy of arm 4/5/2021    RLS (restless legs syndrome) 4/5/2021    Sleep apnea 4/5/2021     No past surgical history on file. Current Outpatient Medications   Medication Sig    traMADoL (ULTRAM) 50 mg tablet Take 1 Tablet by mouth nightly as needed for Pain for up to 30 days. Max Daily Amount: 50 mg. Indications: neuropathic pain    gabapentin (NEURONTIN) 300 mg capsule Take 1 cap in the morning, 1 cap mid day, and 2 caps at bedtime    pramipexole (Mirapex) 1.5 mg tablet Take 1 Tablet by mouth three (3) times daily.  metFORMIN (GLUCOPHAGE) 500 mg tablet Take 1 Tablet by mouth daily (with breakfast).  citalopram (CELEXA) 20 mg tablet Take 1 Tablet by mouth daily.  atorvastatin (LIPITOR) 20 mg tablet Take 1 tab every other day  Indications: high cholesterol and high triglycerides    ibuprofen (MOTRIN) 800 mg tablet as needed.  pantoprazole (PROTONIX) 20 mg tablet Take 1 Tab by mouth daily.  oxybutynin (DITROPAN) 5 mg tablet Take 1 Tab by mouth three (3) times daily. (Patient taking differently: Take 5 mg by mouth as needed.)    aspirin delayed-release 81 mg tablet Take  by mouth daily. No current facility-administered medications for this visit. Allergies and Intolerances: Allergies   Allergen Reactions    Latex Rash and Itching    Sulfa (Sulfonamide Antibiotics) Anaphylaxis     Family History:   Family History   Problem Relation Age of Onset    Thyroid Disease Mother     Hypertension Mother     Diabetes Brother     Diabetes Brother     Heart Attack Brother      Social History:   She  reports that she has never smoked. She has never used smokeless tobacco.   Social History     Substance and Sexual Activity   Alcohol Use Not Currently     Immunization History:  Immunization History   Administered Date(s) Administered    Covid-19, MODERNA, Mrna, Lnp-s, Pf, 100mcg/0.5mL 02/15/2021, 06/14/2021    Pneumococcal Polysaccharide (PPSV-23) 04/02/2021       Review of Systems:   As above included in HPI. Otherwise 11 point review of systems negative including constitutional, skin, HENT, eyes, respiratory, cardiovascular, gastrointestinal, genitourinary, musculoskeletal, endocrine, hematologic, allergy, and neurologic. Physical:   Exam:   Vital Signs: (As obtained by patient/caregiver at home)  There were not vitals taken for this visit.      PHYSICAL EXAMINATION:  [ INSTRUCTIONS:  \"[x]\" Indicates a positive item  \"[]\" Indicates a negative item  -- DELETE ALL ITEMS NOT EXAMINED]      Constitutional: [x] Appears well-developed and well-nourished [x] No apparent distress      [] Abnormal - moderately obese    Mental status: [x] Alert and awake  [x] Oriented to person/place/time [x] Able to follow commands    [] Abnormal -     Eyes:   EOM    [x]  Normal    [] Abnormal -   Sclera  [x]  Normal    [] Abnormal -          Discharge [x]  None visible   [] Abnormal -     HENT, Neck: [x] Normocephalic, atraumatic  [] Abnormal - . [x] Mouth/Throat: Mucous membranes are moist    Pulmonary/Chest: [x] Respiratory effort normal   [x] No visualized signs of difficulty breathing or respiratory distress        [] Abnormal -      Musculoskeletal:   [] Normal gait with no signs of ataxia         [] Normal range of motion of neck        [] Abnormal -   Did not assess ambulation during this visit    Neurological:        [x] No Facial Asymmetry (Cranial nerve 7 motor function) (limited exam due to video visit)          [x] No gaze palsy        [] Abnormal -             Psychiatric:       [x] Normal Affect [] Abnormal -        [x] No Hallucinations    Impression:  Patient Active Problem List   Diagnosis Code    Hyperlipidemia E78.5    Essential hypertension I10    Parkinson's disease ([facility] [address] G20    Anxiety F41.9    Sleep apnea G47.30    OAB (overactive bladder) N32.81    Primary osteoarthritis of both knees M17.0    RLS (restless legs syndrome) G25.81    Radiculopathy of arm M54.10    Type 2 diabetes mellitus with hyperglycemia, without long-term current use of insulin (HCC) E11.65       Plan:      ICD-10-CM ICD-9-CM    1. Primary osteoarthritis of both knees  M17.0 715.16 REFERRAL TO ORTHOPEDICS      traMADoL (ULTRAM) 50 mg tablet      XR KNEE LT MIN 4 V      XR KNEE RT MIN 4 V   2. Vaginal yeast infection  B37.3 112.1      -Primary osteoarthritis both knees  PRAVEEN negative  Duplex venous bilateral negative  Ruled out DVT, PVD/PAD, myopathy d/t statins  Placed order for lumbar xray July 2021, this was not completed-reason unknown.    Placed order for katy knee xrays  Recommended she follow up with ortho for possible Neurologically intact  Initiated Tramadol. Educated in reference to addictive properties with opioids. Use only as needed. Continue motrin as needed   Instructed pt on therapeutic interventions to try at home. Apply ice/heat 3 times a day to help relieve pain. Cautioned patient on the use of NSAIDs and the need to watch for elevations in BP, ankle edema, SOB or wheezing, blood-tinged vomiting, blood in stool or allergic reaction. If these symptoms occur, patient is to stop the medication immediately and contact the office. Patient verbalized understanding.    -Vaginal yeast infection  Recommend OTC Monistat 7   Consume yogurt      Dr. Mary Nazario, AGNP-C, DNP  [facility] [address] , who was evaluated through a synchronous (real-time) audio-video encounter, and/or her healthcare decision maker, is aware that it is a billable service, with coverage as determined by her insurance carrier. She provided verbal consent to proceed: Yes, and patient identification was verified. It was conducted pursuant to the emergency declaration under the [address] [place] [address], [address] [place] [address] authority and the [employer] Act. A caregiver was present when appropriate. Ability to conduct physical exam was limited. I was in the office. The patient was at home.

## 2021-09-14 ENCOUNTER — TELEPHONE (OUTPATIENT)
Dept: INTERNAL MEDICINE CLINIC | Age: 66
End: 2021-09-14

## 2021-09-14 NOTE — TELEPHONE ENCOUNTER
Yehuda Gibson [place]  Please contact patient and request she have the xrays of her knees and back completed ASAP.  Thank you

## 2021-09-17 NOTE — TELEPHONE ENCOUNTER
Patients daughter reached and reminded to have xrays done. She has scheduled her mother for an appointment with ortho as well.

## 2021-09-30 DIAGNOSIS — M17.0 PRIMARY OSTEOARTHRITIS OF BOTH KNEES: ICD-10-CM

## 2021-09-30 RX ORDER — TRAMADOL HYDROCHLORIDE 50 MG/1
50 TABLET ORAL
Qty: 30 TABLET | Refills: 0 | Status: SHIPPED | OUTPATIENT
Start: 2021-09-30 | End: 2021-10-14 | Stop reason: SDUPTHER

## 2021-09-30 NOTE — TELEPHONE ENCOUNTER
Pt wants to take 3 x day per daughter Ashlee Lacy. Pt is going to have knee replacement 11/12/21 and in quite a bit of pain and orthpaedic said to call pcp for medication.   Any questions, call Ashlee Lacy at [phone]

## 2021-10-01 ENCOUNTER — TELEPHONE (OUTPATIENT)
Dept: INTERNAL MEDICINE CLINIC | Age: 66
End: 2021-10-01

## 2021-10-01 NOTE — TELEPHONE ENCOUNTER
I refilled patient's tramadol. She may take this 2 times a day as needed. Remind her she needs to get the x-rays completed to her knees and her back as requested. Remind her of her Ortho appointment on 10/5/2021. Encouraged her not to miss that appointment. May need to discuss pain management options. Thank you    Requested Prescriptions     Signed Prescriptions Disp Refills    traMADoL (ULTRAM) 50 mg tablet 30 Tablet 0     Sig: Take 1 Tablet by mouth two (2) times daily as needed for Pain for up to 30 days. Max Daily Amount: 100 mg.  Indications: neuropathic pain     Authorizing Provider: Samia Leon

## 2021-10-01 NOTE — TELEPHONE ENCOUNTER
Patient reached and made aware of refill. Patient states her appt with FRANCISCO got cancelled so she scheduled an appt with Dr. Marifer Bolton @ [facility]. Patient stated she completed XRs for Dr. Marifer Bolton and everything should have been faxed over to [facility] because she is scheduled to have a right TKR on 11/12/2021 due to her knee becoming bone to bone. Patient states Dr. Marifer Bolton doesn't start pain management therapy until after surgery so he is requesting pt receive pain mgmt therapy through Dr. Jessica Mejia until patients surgery.

## 2021-10-14 ENCOUNTER — OFFICE VISIT (OUTPATIENT)
Dept: NEUROLOGY | Age: 66
End: 2021-10-14
Payer: MEDICARE

## 2021-10-14 VITALS
WEIGHT: 199.6 LBS | OXYGEN SATURATION: 97 % | BODY MASS INDEX: 36.73 KG/M2 | HEIGHT: 62 IN | SYSTOLIC BLOOD PRESSURE: 132 MMHG | RESPIRATION RATE: 20 BRPM | DIASTOLIC BLOOD PRESSURE: 70 MMHG | HEART RATE: 70 BPM

## 2021-10-14 DIAGNOSIS — G25.81 RLS (RESTLESS LEGS SYNDROME): ICD-10-CM

## 2021-10-14 DIAGNOSIS — M17.0 PRIMARY OSTEOARTHRITIS OF BOTH KNEES: ICD-10-CM

## 2021-10-14 DIAGNOSIS — G20 PARKINSON DISEASE (HCC): Primary | ICD-10-CM

## 2021-10-14 DIAGNOSIS — G89.29 CHRONIC NECK PAIN: ICD-10-CM

## 2021-10-14 DIAGNOSIS — M54.2 CHRONIC NECK PAIN: ICD-10-CM

## 2021-10-14 PROCEDURE — 3017F COLORECTAL CA SCREEN DOC REV: CPT | Performed by: STUDENT IN AN ORGANIZED HEALTH CARE EDUCATION/TRAINING PROGRAM

## 2021-10-14 PROCEDURE — G8432 DEP SCR NOT DOC, RNG: HCPCS | Performed by: STUDENT IN AN ORGANIZED HEALTH CARE EDUCATION/TRAINING PROGRAM

## 2021-10-14 PROCEDURE — G8754 DIAS BP LESS 90: HCPCS | Performed by: STUDENT IN AN ORGANIZED HEALTH CARE EDUCATION/TRAINING PROGRAM

## 2021-10-14 PROCEDURE — G9899 SCRN MAM PERF RSLTS DOC: HCPCS | Performed by: STUDENT IN AN ORGANIZED HEALTH CARE EDUCATION/TRAINING PROGRAM

## 2021-10-14 PROCEDURE — G8536 NO DOC ELDER MAL SCRN: HCPCS | Performed by: STUDENT IN AN ORGANIZED HEALTH CARE EDUCATION/TRAINING PROGRAM

## 2021-10-14 PROCEDURE — 99214 OFFICE O/P EST MOD 30 MIN: CPT | Performed by: STUDENT IN AN ORGANIZED HEALTH CARE EDUCATION/TRAINING PROGRAM

## 2021-10-14 PROCEDURE — G8399 PT W/DXA RESULTS DOCUMENT: HCPCS | Performed by: STUDENT IN AN ORGANIZED HEALTH CARE EDUCATION/TRAINING PROGRAM

## 2021-10-14 PROCEDURE — G8417 CALC BMI ABV UP PARAM F/U: HCPCS | Performed by: STUDENT IN AN ORGANIZED HEALTH CARE EDUCATION/TRAINING PROGRAM

## 2021-10-14 PROCEDURE — 1090F PRES/ABSN URINE INCON ASSESS: CPT | Performed by: STUDENT IN AN ORGANIZED HEALTH CARE EDUCATION/TRAINING PROGRAM

## 2021-10-14 PROCEDURE — G8427 DOCREV CUR MEDS BY ELIG CLIN: HCPCS | Performed by: STUDENT IN AN ORGANIZED HEALTH CARE EDUCATION/TRAINING PROGRAM

## 2021-10-14 PROCEDURE — G8752 SYS BP LESS 140: HCPCS | Performed by: STUDENT IN AN ORGANIZED HEALTH CARE EDUCATION/TRAINING PROGRAM

## 2021-10-14 PROCEDURE — 1101F PT FALLS ASSESS-DOCD LE1/YR: CPT | Performed by: STUDENT IN AN ORGANIZED HEALTH CARE EDUCATION/TRAINING PROGRAM

## 2021-10-14 RX ORDER — CARBIDOPA AND LEVODOPA 25; 100 MG/1; MG/1
1 TABLET, EXTENDED RELEASE ORAL 2 TIMES DAILY
Qty: 180 TABLET | Refills: 3 | Status: SHIPPED | OUTPATIENT
Start: 2021-10-14 | End: 2021-10-26 | Stop reason: SDUPTHER

## 2021-10-14 RX ORDER — GABAPENTIN 300 MG/1
300 CAPSULE ORAL
Qty: 90 CAPSULE | Refills: 3 | Status: SHIPPED | OUTPATIENT
Start: 2021-10-14 | End: 2022-01-12

## 2021-10-14 RX ORDER — PRAMIPEXOLE DIHYDROCHLORIDE 1.5 MG/1
1.5 TABLET ORAL
Qty: 90 TABLET | Refills: 3 | Status: SHIPPED | OUTPATIENT
Start: 2021-10-14 | End: 2022-01-12

## 2021-10-14 NOTE — PROGRESS NOTES
Maricruz Perrin is a 77 y.o. female . presents for Parkinsons Disease   . A 77years old female patient here for follow-up of Parkinson's disease and restless leg. Last seen in the clinic on June 7, 2021. She is currently on Sinemet 25/100, 1.5 tab p.o. 3 times daily. In addition she also has pramipexole which she takes 1 mg at bedtime; also takes gabapentin 300 mg p.o. twice daily (for chronic pain; also helps with RLS). Since her last visit, she didn't any worsening symptoms. Tremors are mild and usually when she is excited; her daughter mentioned that she might have both resting and postural tremors. Some difficulty walking and claims it is mainly from her knee pain. Continues to have the balance difficulty; sometimes mild goes backwards; no recent falls. She Bumpus a lot into doors. She feels more stiff in her lower extremities and upper trunk in the morning. She feels a slow when walking but merrily from pain. Occasional lack of sleep from restless leg syndrome. Does not have any bad dreams during her sleep; does not act out her dreams. Her speech is normal.  Sometimes drools from her mouth. Has occasional choking; previous history of esophageal stricture status post dilatation. Has anosmia. Denied constipation. Has decreased the dose of gabapentin by itself to 300 mg p.o. twice daily. From previous encounter:  A 77years old female patient with medical history of anxiety, diabetes, hypertension, hypercholesterolemia, osteoarthritis, obstructive sleep apnea, restless leg syndrome, and Parkinson's disease here for establishing care for follow-up of her Parkinson's disease and restless leg syndrome. She moved from [place] to [place] about 3 months ago. Used to follow with a neurologist at [facility] in [address] (Dr. Caden Lyles). She was diagnosed with Parkinson's disease in 2014. Currently she is taking carbidopa/levodopa (25/100), 1.5 tab p.o. 3 times daily.   She also has pramipexole 1 mg p.o. before bedtime for RLS. Claims the Sinemet is helping her with her PD symptoms. She has been having tremors of her upper extremities for a long time. Daughter mentions that initially they noticed resting tremor. But patient mentioned that she has tremor when she is trying to hold something. Also mentions that she has some difficulty writing because of tremor: As she continues writing, the letters seem to become smaller and smaller. There is no lower extremity tremor. No head and neck tremor. She feels a stiff over the upper and lower extremities. She is unsteady when she is walking and sometimes her legs might get stiff and she might fall backwards. She does not use support mostly. Does not shuffle her gait. She was noticed to bend her neck when she was walking. She has difficulty climbing stairs and sometimes appears that she is crawling. Afraid to fall backwards. No significant difficulty speaking loud. She has some difficulty swallowing and occasional choking. Has drooling from her mouth. At nighttime, has difficulty turning in bed. She mentioned visual hallucinations occasionally where she might see little animals in the house. She has endorsed forgetfulness. Some difficulty with word finding in conversations. Sometimes might repeat. She forgets appointments and needs reminders. She takes her medications but daughter reminds her. She has restless leg syndrome: Moves her legs a lot when she is in bed; has an urge to move them. She feels better when she walks around. She is currently on pramipexole 1 mg p.o. before bedtime. Used to work well in the past.  Denied having any constipation. Denied depression. She has anosmia for a long time. Review of Systems   Constitutional: Negative for chills, fever and weight loss. HENT: Positive for hearing loss (right more than the left). Negative for tinnitus.          Has anosmia   Eyes: Negative for blurred vision (needs new glasses) and double vision. Respiratory: Positive for cough (better). Negative for hemoptysis, sputum production and shortness of breath. Cardiovascular: Positive for leg swelling. Negative for chest pain. Gastrointestinal: Negative for constipation, heartburn, nausea and vomiting. Genitourinary: Negative for dysuria, frequency and urgency. Musculoskeletal: Positive for back pain, joint pain and neck pain. Negative for falls and myalgias. Skin: Positive for itching (legs and abdomen) and rash (intermittent). Neurological: Positive for tingling (legs), tremors and sensory change. Negative for dizziness, speech change (someword finding difficulty), focal weakness, seizures and headaches. Psychiatric/Behavioral: Positive for hallucinations (sometimes seeing mosquitos in her skin). Negative for depression. The patient is nervous/anxious and has insomnia. Past Medical History:   Diagnosis Date    Anxiety 4/5/2021    Diabetes ([facility] [address]     Essential hypertension 4/5/2021    Hyperlipidemia 4/5/2021    OAB (overactive bladder) 4/5/2021    Parkinson's disease ([facility] [address] 4/5/2021    Primary osteoarthritis of both knees 4/5/2021    Radiculopathy of arm 4/5/2021    RLS (restless legs syndrome) 4/5/2021    Sleep apnea 4/5/2021       No past surgical history on file.      Family History   Problem Relation Age of Onset    Thyroid Disease Mother     Hypertension Mother     Diabetes Brother     Diabetes Brother     Heart Attack Brother         Social History     Socioeconomic History    Marital status: SINGLE     Spouse name: Not on file    Number of children: Not on file    Years of education: Not on file    Highest education level: Not on file   Occupational History    Not on file   Tobacco Use    Smoking status: Never Smoker    Smokeless tobacco: Never Used   Vaping Use    Vaping Use: Never used   Substance and Sexual Activity    Alcohol use: Not Currently    Drug use: Never    Sexual activity: Not on file   Other Topics Concern    Not on file   Social History Narrative    Not on file     Social Determinants of Health     Financial Resource Strain:     Difficulty of Paying Living Expenses:    Food Insecurity:     Worried About Running Out of Food in the Last Year:     [address] in the Last Year:    Transportation Needs:     Lack of Transportation (Medical):  Lack of Transportation (Non-Medical):    Physical Activity:     Days of Exercise per Week:     Minutes of Exercise per Session:    Stress:     Feeling of Stress :    Social Connections:     Frequency of Communication with Friends and Family:     Frequency of Social Gatherings with Friends and Family:     Attends Hinduism Services:     Active Member of Clubs or Organizations:     Attends Club or Organization Meetings:     Marital Status:    Intimate Partner Violence:     Fear of Current or Ex-Partner:     Emotionally Abused:     Physically Abused:     Sexually Abused: Allergies   Allergen Reactions    Latex Rash and Itching    Sulfa (Sulfonamide Antibiotics) Anaphylaxis         Current Outpatient Medications   Medication Sig Dispense Refill    carbidopa-levodopa ER (Sinemet CR)  mg per tablet Take 1 Tablet by mouth two (2) times a day for 90 days. 180 Tablet 3    pramipexole (Mirapex) 1.5 mg tablet Take 1 Tablet by mouth nightly for 90 days. 90 Tablet 3    gabapentin (NEURONTIN) 300 mg capsule Take 1 Capsule by mouth nightly for 90 days. Max Daily Amount: 300 mg. 90 Capsule 3    traMADoL (ULTRAM) 50 mg tablet Take 1 Tablet by mouth two (2) times daily as needed for Pain for up to 30 days. Max Daily Amount: 100 mg. Indications: neuropathic pain 30 Tablet 0    metFORMIN (GLUCOPHAGE) 500 mg tablet Take 1 Tablet by mouth daily (with breakfast). 180 Tablet 0    citalopram (CELEXA) 20 mg tablet Take 1 Tablet by mouth daily.  90 Tablet 1    atorvastatin (LIPITOR) 20 mg tablet Take 1 tab every other day Indications: high cholesterol and high triglycerides 90 Tablet 0    ibuprofen (MOTRIN) 800 mg tablet as needed.  pantoprazole (PROTONIX) 20 mg tablet Take 1 Tab by mouth daily. 90 Tab 3    oxybutynin (DITROPAN) 5 mg tablet Take 1 Tab by mouth three (3) times daily. (Patient taking differently: Take 5 mg by mouth as needed.) 90 Tab 1    aspirin delayed-release 81 mg tablet Take  by mouth daily. Physical Exam  Constitutional:       Appearance: Normal appearance. HENT:      Head: Normocephalic and atraumatic. Mouth/Throat:      Mouth: Mucous membranes are moist.      Pharynx: Oropharynx is clear. No oropharyngeal exudate. Eyes:      Extraocular Movements: Extraocular movements intact. Pupils: Pupils are equal, round, and reactive to light. Pulmonary:      Effort: Pulmonary effort is normal. No respiratory distress. Musculoskeletal:         General: Normal range of motion. Cervical back: Normal range of motion and neck supple. Right lower leg: No edema. Left lower leg: No edema. Neurological:      Mental Status: She is alert. Comments: Mental status: Awake, alert, oriented x3, follows simple and complex commands, no neglect, no extinction to DSS or VSS. Speech and languge: fluent, coherent,and comprehension intact  CN: VFF, EOMI, PERRLA, face sensation intact , no facial asymmetry noted, palate elevation symmetric bilat, SS+SCM 5/5 bilat, tongue midline  Motor: no pronator drift, tone normal throughout with  cogwheeling, strength 5/5 throughout  Sensory: intact to light touch and PP throughout  Coordination: FNF, HS accurate w/o dysmetria. No visble tremor at rest, with posture, or with FNF. DTR: 2+ throughout. Gait: nancy: No festination; normal arm swing, normal turn. No visits with results within 3 Month(s) from this visit.    Latest known visit with results is:   Orders Only on 07/02/2021   Component Date Value Ref Range Status    Left arm BP 07/28/2021 146  mmHg Final    Left posterior tibial 07/28/2021 157  mmHg Final    Left anterior tibial 07/28/2021 146  mmHg Final    Right arm BP 07/28/2021 148  mmHg Final    Right posterior tibial 07/28/2021 142  mmHg Final    Right anterior tibial 07/28/2021 142  mmHg Final    Left PRAVEEN 07/28/2021 1.06   Final    Right PRAVEEN 07/28/2021 0.96   Final             ICD-10-CM ICD-9-CM    1. Parkinson disease ([facility] [address]  G20 332.0 carbidopa-levodopa ER (Sinemet CR)  mg per tablet      pramipexole (Mirapex) 1.5 mg tablet   2. RLS (restless legs syndrome)  G25.81 333.94 pramipexole (Mirapex) 1.5 mg tablet      gabapentin (NEURONTIN) 300 mg capsule   3. Chronic neck pain  M54.2 723.1 gabapentin (NEURONTIN) 300 mg capsule    G89.29 338.29        A 74years old female patient here for follow-up of Parkinson's disease. She is currently on Sinemet 25/100, 1.5 tab p.o. 3 times daily; also has some pramipexole 1 mg p.o. nightly for RLS. No worsening symptoms. Patient has mild tremors when she is excited: Both resting and postural.  Stiffness in the morning. No significant slowness. Still has balance difficulty. Patient also has knee pain and is scheduled for knee replacement surgery; some of her gait difficulty could be from her arthritis. Patient symptoms are very mild at this time. Will decrease the dose of Sinemet gradually to 1 tab p.o. 3 times daily [we will decrease by half tab every week]. Restless leg syndrome is bothering her too much. I have advised her to take pramipexole 1.5 mg 1 hour before bedtime. In addition, she is taking gabapentin for her chronic pain. Advised her to take 600 mg p.o. nightly to help her with the restless leg syndrome. We will get records [was told that she had a Randye Ludington from her previous neurologist in [place]. We will see her again in 4 months time.

## 2021-10-14 NOTE — TELEPHONE ENCOUNTER
Last Visit: 9/10/21 with THOMPSON Gibson  Next Appointment: 10/22/21 with THOMPSON Gibson  Previous Refill Encounter(s): 9/30/21 #30    Requested Prescriptions     Pending Prescriptions Disp Refills    traMADoL (ULTRAM) 50 mg tablet 30 Tablet 0     Sig: Take 1 Tablet by mouth two (2) times daily as needed for Pain for up to 30 days. Max Daily Amount: 100 mg.  Indications: neuropathic pain

## 2021-10-15 RX ORDER — TRAMADOL HYDROCHLORIDE 50 MG/1
50 TABLET ORAL
Qty: 60 TABLET | Refills: 0 | Status: SHIPPED | OUTPATIENT
Start: 2021-10-15 | End: 2021-11-14

## 2021-10-15 NOTE — TELEPHONE ENCOUNTER
Patient was to have x-rays of her knees and back which were ordered September 12, 2021. These imagings were never completed. She had an appointment with Ortho 10/5/2021 and was encouraged not to miss that appointment. She called and canceled that appointment and did not reschedule. She also no showed her neurology appointment. Since patient is not following through with what has been asked of her, I will fill this tramadol prescription for 30 days but will not fill any more. Please make sure patient and daughter are aware I will not be refilling the tramadol after this month until she follows through with imaging and seeing Ortho. Thank you    I have reviewed this patient's controlled substance prescription history through the Prescription Monitoring Program. No inconsistencies noted.

## 2021-10-15 NOTE — TELEPHONE ENCOUNTER
Pt calling for refill status of tramadol. Stated he will run out of medication this weekend.  Asking please process refill as soon as possible

## 2021-10-18 NOTE — TELEPHONE ENCOUNTER
Patient sees Dr. Mj Atkins @ [facility] for Ortho, pt cancelled FRANCISCO appt because of this. Saw Dr. Gretchen Clifton on 10/14. XR's of knee were done with Dr. Mj Atkins.

## 2021-10-18 NOTE — TELEPHONE ENCOUNTER
Okay great and glad she is seeing an orthopedist.  She will need to request further refills of tramadol via the Ortho.     Thank you

## 2021-10-18 NOTE — TELEPHONE ENCOUNTER
Patient's daughter reached and made aware of refill sent and message per Dr. Itzel Suarez. Patients daughter verbalized understanding.

## 2021-10-22 ENCOUNTER — OFFICE VISIT (OUTPATIENT)
Dept: INTERNAL MEDICINE CLINIC | Age: 66
End: 2021-10-22
Payer: MEDICARE

## 2021-10-22 VITALS
OXYGEN SATURATION: 95 % | HEIGHT: 62 IN | BODY MASS INDEX: 36.47 KG/M2 | WEIGHT: 198.2 LBS | RESPIRATION RATE: 16 BRPM | SYSTOLIC BLOOD PRESSURE: 116 MMHG | TEMPERATURE: 96.7 F | HEART RATE: 73 BPM | DIASTOLIC BLOOD PRESSURE: 67 MMHG

## 2021-10-22 DIAGNOSIS — M17.0 PRIMARY OSTEOARTHRITIS OF BOTH KNEES: ICD-10-CM

## 2021-10-22 DIAGNOSIS — Z01.818 PREOPERATIVE CLEARANCE: Primary | ICD-10-CM

## 2021-10-22 DIAGNOSIS — Z23 NEEDS FLU SHOT: ICD-10-CM

## 2021-10-22 PROCEDURE — 90694 VACC AIIV4 NO PRSRV 0.5ML IM: CPT | Performed by: NURSE PRACTITIONER

## 2021-10-22 PROCEDURE — G8417 CALC BMI ABV UP PARAM F/U: HCPCS | Performed by: NURSE PRACTITIONER

## 2021-10-22 PROCEDURE — 1101F PT FALLS ASSESS-DOCD LE1/YR: CPT | Performed by: NURSE PRACTITIONER

## 2021-10-22 PROCEDURE — G8752 SYS BP LESS 140: HCPCS | Performed by: NURSE PRACTITIONER

## 2021-10-22 PROCEDURE — G8432 DEP SCR NOT DOC, RNG: HCPCS | Performed by: NURSE PRACTITIONER

## 2021-10-22 PROCEDURE — G8754 DIAS BP LESS 90: HCPCS | Performed by: NURSE PRACTITIONER

## 2021-10-22 PROCEDURE — 3017F COLORECTAL CA SCREEN DOC REV: CPT | Performed by: NURSE PRACTITIONER

## 2021-10-22 PROCEDURE — G8399 PT W/DXA RESULTS DOCUMENT: HCPCS | Performed by: NURSE PRACTITIONER

## 2021-10-22 PROCEDURE — G9899 SCRN MAM PERF RSLTS DOC: HCPCS | Performed by: NURSE PRACTITIONER

## 2021-10-22 PROCEDURE — 1090F PRES/ABSN URINE INCON ASSESS: CPT | Performed by: NURSE PRACTITIONER

## 2021-10-22 PROCEDURE — G8536 NO DOC ELDER MAL SCRN: HCPCS | Performed by: NURSE PRACTITIONER

## 2021-10-22 PROCEDURE — G8427 DOCREV CUR MEDS BY ELIG CLIN: HCPCS | Performed by: NURSE PRACTITIONER

## 2021-10-22 PROCEDURE — 99213 OFFICE O/P EST LOW 20 MIN: CPT | Performed by: NURSE PRACTITIONER

## 2021-10-22 PROCEDURE — G0008 ADMIN INFLUENZA VIRUS VAC: HCPCS | Performed by: NURSE PRACTITIONER

## 2021-10-22 NOTE — PATIENT INSTRUCTIONS
Vaccine Information Statement    Influenza (Flu) Vaccine (Inactivated or Recombinant): What You Need to Know    Many vaccine information statements are available in [place] and other languages. See www.immunize.org/vis. Hojas de información sobre vacunas están disponibles en español y en muchos otros idiomas. Visite www.immunize.org/vis. 1. Why get vaccinated? Influenza vaccine can prevent influenza (flu). Flu is a contagious disease that spreads around the [facility] every year, usually between October and May. Anyone can get the flu, but it is more dangerous for some people. Infants and young children, people 72 years and older, pregnant people, and people with certain health conditions or a weakened immune system are at greatest risk of flu complications. Pneumonia, bronchitis, sinus infections, and ear infections are examples of flu-related complications. If you have a medical condition, such as heart disease, cancer, or diabetes, flu can make it worse. Flu can cause fever and chills, sore throat, muscle aches, fatigue, cough, headache, and runny or stuffy nose. Some people may have vomiting and diarrhea, though this is more common in children than adults. In an average year, thousands of people in the [facility] die from flu, and many more are hospitalized. Flu vaccine prevents millions of illnesses and flu-related visits to the doctor each year. 2. Influenza vaccines     CDC recommends everyone 6 months and older get vaccinated every flu season. Children 6 months through 6years of age may need 2 doses during a single flu season. Everyone else needs only 1 dose each flu season. It takes about 2 weeks for protection to develop after vaccination. There are many flu viruses, and they are always changing. Each year a new flu vaccine is made to protect against the influenza viruses believed to be likely to cause disease in the upcoming flu season.  Even when the vaccine doesnt exactly match these viruses, it may still provide some protection. Influenza vaccine does not cause flu. Influenza vaccine may be given at the same time as other vaccines. 3. Talk with your health care provider    Tell your vaccination provider if the person getting the vaccine:   Has had an allergic reaction after a previous dose of influenza vaccine, or has any severe, life-threatening allergies    Has ever had Guillain-Barré Syndrome (also called GBS)    In some cases, your health care provider may decide to postpone influenza vaccination until a future visit. Influenza vaccine can be administered at any time during pregnancy. People who are or will be pregnant during influenza season should receive inactivated influenza vaccine. People with minor illnesses, such as a cold, may be vaccinated. People who are moderately or severely ill should usually wait until they recover before getting influenza vaccine. Your health care provider can give you more information. 4. Risks of a vaccine reaction     Soreness, redness, and swelling where the shot is given, fever, muscle aches, and headache can happen after influenza vaccination.  There may be a very small increased risk of Guillain-Barré Syndrome (GBS) after inactivated influenza vaccine (the flu shot). [facility] children who get the flu shot along with pneumococcal vaccine (PCV13) and/or DTaP vaccine at the same time might be slightly more likely to have a seizure caused by fever. Tell your health care provider if a child who is getting flu vaccine has ever had a seizure. People sometimes faint after medical procedures, including vaccination. Tell your provider if you feel dizzy or have vision changes or ringing in the ears. As with any medicine, there is a very remote chance of a vaccine causing a severe allergic reaction, other serious injury, or death. 5. What if there is a serious problem?     An allergic reaction could occur after the vaccinated person leaves the clinic. If you see signs of a severe allergic reaction (hives, swelling of the face and throat, difficulty breathing, a fast heartbeat, dizziness, or weakness), call 9-1-1 and get the person to the nearest hospital.    For other signs that concern you, call your health care provider. Adverse reactions should be reported to the Vaccine Adverse Event Reporting System (VAERS). Your health care provider will usually file this report, or you can do it yourself. Visit the VAERS website at www.vaers. [facility].gov or call 3-767.454.2476. VAERS is only for reporting reactions, and VAERS staff members do not give medical advice. 6. The National Vaccine Injury Compensation Program    The [facility] Vaccine Injury Compensation Program (VICP) is a federal program that was created to compensate people who may have been injured by certain vaccines. Claims regarding alleged injury or death due to vaccination have a time limit for filing, which may be as short as two years. Visit the VICP website at www.[facility].gov/vaccinecompensation or call 9-189.838.3972 to learn about the program and about filing a claim. 7. How can I learn more?  Ask your health care provider.  Call your local or state health department.  Visit the website of the Food and Drug Administration (FDA) for vaccine package inserts and additional information at www.fda.gov/vaccines-blood-biologics/vaccines.  Contact the Centers for Disease Control and Prevention (CDC):  - Call 5-481.673.2053 (1-800-CDC-INFO) or  - Visit CDCs influenza website at www.cdc.gov/flu. Vaccine Information Statement   Inactivated Influenza Vaccine   8/6/2021  42 TEVIN Garvin 820OM-36   Department of Health and Human Services  Centers for Disease Control and Prevention    Office Use Only

## 2021-10-22 NOTE — PROGRESS NOTES
Subjective:   I am seeing Diana Chaudhary 1955  is a pleasant WHITE/NON- female for preop exam.    Planned surgery: Rt total knee replacement. Date 11/12/2021. Provider Dr Marian Azevedo. ROS: No recent infections, has been feeling well other than presenting surgical complaint. No CNS, cardiorespiratory or GI symptoms otherwise. Past Medical History:   Diagnosis Date    Anxiety 4/5/2021    Diabetes ([facility]     Essential hypertension 4/5/2021    Hyperlipidemia 4/5/2021    OAB (overactive bladder) 4/5/2021    Parkinson's disease ([facility] 4/5/2021    Primary osteoarthritis of both knees 4/5/2021    Radiculopathy of arm 4/5/2021    RLS (restless legs syndrome) 4/5/2021    Sleep apnea 4/5/2021         Current Outpatient Medications on File Prior to Visit   Medication Sig Dispense Refill    traMADoL (ULTRAM) 50 mg tablet Take 1 Tablet by mouth two (2) times daily as needed for Pain for up to 30 days. Max Daily Amount: 100 mg. Indications: neuropathic pain 60 Tablet 0    carbidopa-levodopa ER (Sinemet CR)  mg per tablet Take 1 Tablet by mouth two (2) times a day for 90 days. 180 Tablet 3    pramipexole (Mirapex) 1.5 mg tablet Take 1 Tablet by mouth nightly for 90 days. 90 Tablet 3    gabapentin (NEURONTIN) 300 mg capsule Take 1 Capsule by mouth nightly for 90 days. Max Daily Amount: 300 mg. 90 Capsule 3    metFORMIN (GLUCOPHAGE) 500 mg tablet Take 1 Tablet by mouth daily (with breakfast). 180 Tablet 0    citalopram (CELEXA) 20 mg tablet Take 1 Tablet by mouth daily. 90 Tablet 1    atorvastatin (LIPITOR) 20 mg tablet Take 1 tab every other day  Indications: high cholesterol and high triglycerides 90 Tablet 0    ibuprofen (MOTRIN) 800 mg tablet as needed.  pantoprazole (PROTONIX) 20 mg tablet Take 1 Tab by mouth daily. 90 Tab 3    aspirin delayed-release 81 mg tablet Take  by mouth daily. No current facility-administered medications on file prior to visit.          Allergies Allergen Reactions    Latex Rash and Itching    Sulfa (Sulfonamide Antibiotics) Anaphylaxis         Objective:   Visit Vitals  Visit Vitals  /67   Pulse 73   Temp (!) 96.7 °F (35.9 °C) (Temporal)   Resp 16   Ht 5' 2\" (1.575 m)   Wt 198 lb 3.2 oz (89.9 kg)   SpO2 95%   BMI 36.25 kg/m²        The physical exam is unremarkable. Patient appears well, alert and oriented, pleasant and cooperative. Vitals as noted. Lungs are clear to auscultation. Heart sounds are normal.      Reviewed labs available for examination, EKG LVH; sinus rhythm, CXR n/a    Assessment/Plan:       ICD-10-CM ICD-9-CM    1. Preoperative clearance  Z01.818 V72.84    2. Primary osteoarthritis of both knees  M17.0 715.16    3. Needs flu shot  Z23 V04.81 FLU (FLUAD QUAD INFLUENZA VACCINE,QUAD,ADJUVANTED)       Felt to be average risk for the planned procedure. No contraindications noted. Routine post prophylaxis as per protocol. Patient may proceed with planned surgery. All questions to be answered, risks and benefits to be explained, and consent to be obtained via surgeon and their staff. Total time: 20 minutes spent with the patient on counseling, answering questions, and/or coordination of care.        Dr Valentine Benjamin, MG  [facility] [address] [place] [address]

## 2021-10-22 NOTE — PROGRESS NOTES
Chief Complaint   Patient presents with    Pre-op Exam     11/12/2021 Right TKR Dr. Watson       1. Have you been to the ER, urgent care clinic since your last visit? Hospitalized since your last visit? No    2. Have you seen or consulted any other health care providers outside of the [address] [place] [address] since your last visit? Include any pap smears or colon screening.  No

## 2021-10-25 NOTE — PROGRESS NOTES
Delta Garcias 1955 female who presents for routine immunizations. Patient denies any symptoms , reactions or allergies that would exclude them from being immunized today. Risks and adverse reactions were discussed and the VIS was given to them. All questions were addressed. Order placed for HDFLU in right deltoid,  per Verbal Order from Lazarus Griffon, [address], with read back. Patient declined observation.     Steven Allen CMA

## 2021-10-26 ENCOUNTER — TELEPHONE (OUTPATIENT)
Dept: NEUROLOGY | Age: 66
End: 2021-10-26

## 2021-10-26 DIAGNOSIS — G25.81 RLS (RESTLESS LEGS SYNDROME): Primary | ICD-10-CM

## 2021-10-26 DIAGNOSIS — G20 PARKINSON DISEASE (HCC): ICD-10-CM

## 2021-10-26 RX ORDER — CARBIDOPA AND LEVODOPA 25; 100 MG/1; MG/1
1 TABLET ORAL 2 TIMES DAILY
Qty: 180 TABLET | Refills: 2 | Status: SHIPPED | OUTPATIENT
Start: 2021-10-26 | End: 2021-10-27 | Stop reason: SDUPTHER

## 2021-10-26 NOTE — TELEPHONE ENCOUNTER
Patient's daughter; Jany Amador calling with concerns of medication change. She reports the patient was taking Carbidopa-Levodopa regular instead of the extended release. Per the daughter, the patient prefers the original due to feeling fine on it and the cost of co-pay associated with the change. Please advise and refill the original medication.

## 2021-10-27 DIAGNOSIS — G20 PARKINSON DISEASE (HCC): ICD-10-CM

## 2021-10-27 RX ORDER — CARBIDOPA AND LEVODOPA 25; 100 MG/1; MG/1
1 TABLET ORAL 2 TIMES DAILY
Qty: 180 TABLET | Refills: 2 | Status: SHIPPED | OUTPATIENT
Start: 2021-10-27 | End: 2021-12-10 | Stop reason: DRUGHIGH

## 2021-10-27 NOTE — TELEPHONE ENCOUNTER
Spoke with daughter, made aware. She would like to use the [facility] order instead of [facility]. Medication will be pended to requested pharmacy.

## 2021-12-01 DIAGNOSIS — F41.9 ANXIETY: ICD-10-CM

## 2021-12-01 DIAGNOSIS — E78.5 HYPERLIPIDEMIA, UNSPECIFIED HYPERLIPIDEMIA TYPE: ICD-10-CM

## 2021-12-06 RX ORDER — ATORVASTATIN CALCIUM 20 MG/1
TABLET, FILM COATED ORAL
Qty: 45 TABLET | Refills: 1 | Status: SHIPPED | OUTPATIENT
Start: 2021-12-06 | End: 2022-01-24 | Stop reason: SDUPTHER

## 2021-12-06 RX ORDER — CITALOPRAM 20 MG/1
TABLET, FILM COATED ORAL
Qty: 90 TABLET | Refills: 1 | Status: SHIPPED | OUTPATIENT
Start: 2021-12-06 | End: 2022-01-24 | Stop reason: SDUPTHER

## 2021-12-08 ENCOUNTER — TELEPHONE (OUTPATIENT)
Dept: NEUROLOGY | Age: 66
End: 2021-12-08

## 2021-12-08 NOTE — TELEPHONE ENCOUNTER
Pt daughter called to request a correction to Rx Carcidopa-levodopa. Pt need  mg tablet 1 1/2 tablet 3x daily.  [facility] mail order

## 2021-12-10 DIAGNOSIS — G20 PARKINSON DISEASE (HCC): Primary | ICD-10-CM

## 2021-12-10 RX ORDER — CARBIDOPA AND LEVODOPA 25; 100 MG/1; MG/1
1.5 TABLET ORAL 3 TIMES DAILY
Qty: 135 TABLET | Refills: 5 | Status: SHIPPED | OUTPATIENT
Start: 2021-12-10 | End: 2021-12-10

## 2021-12-10 RX ORDER — CARBIDOPA AND LEVODOPA 25; 100 MG/1; MG/1
1.5 TABLET ORAL 3 TIMES DAILY
Qty: 135 TABLET | Refills: 5 | Status: SHIPPED | OUTPATIENT
Start: 2021-12-10 | End: 2021-12-16 | Stop reason: SDUPTHER

## 2021-12-10 NOTE — TELEPHONE ENCOUNTER
Spoke with daughter. She reports they did not fully decrease medication since patient wasn't feeling well on the dosage change. She has been taking 1 1/2 TID. Please advise on refill.

## 2021-12-16 ENCOUNTER — TELEPHONE (OUTPATIENT)
Dept: INTERNAL MEDICINE CLINIC | Age: 66
End: 2021-12-16

## 2021-12-16 ENCOUNTER — TELEPHONE (OUTPATIENT)
Dept: NEUROLOGY | Age: 66
End: 2021-12-16

## 2021-12-16 DIAGNOSIS — G20 PARKINSON DISEASE (HCC): ICD-10-CM

## 2021-12-16 RX ORDER — CARBIDOPA AND LEVODOPA 25; 100 MG/1; MG/1
1.5 TABLET ORAL 3 TIMES DAILY
Qty: 135 TABLET | Refills: 0 | Status: SHIPPED | OUTPATIENT
Start: 2021-12-16 | End: 2022-01-15

## 2021-12-16 NOTE — TELEPHONE ENCOUNTER
Pt daughter called and is requesting another Rx for Carcidopa Levodopa be sent to [facility].  It was ordered thru mail order and [facility] is behind in delivery, status keep pending stating \"in transit\", pt stated mom is out of meds and need ASAP

## 2021-12-16 NOTE — TELEPHONE ENCOUNTER
Mail order in transit and running behind with [employer]. Daughter is requesting a small supply sent to local pharmacy.

## 2021-12-16 NOTE — TELEPHONE ENCOUNTER
----- Message from Josseline Robles sent at 12/16/2021  8:56 AM EST -----  Subject: Message to Provider    QUESTIONS  Information for Provider? pt daughter Terence Persaud would need a refill   of carbidopa-levodopa (Sinemet)  mg because [place] and is out of meds. Pt just needs an emergency order until the meds   arrive.   ---------------------------------------------------------------------------  --------------  CALL BACK INFO  What is the best way for the office to contact you? OK to leave message on   voicemail  Preferred Call Back Phone Number? [phone]  ---------------------------------------------------------------------------  --------------  SCRIPT ANSWERS  Relationship to Patient? Other  Representative Name? Terence Persaud  Is the Representative on the appropriate HIPAA document in Epic?  Yes

## 2021-12-22 ENCOUNTER — VIRTUAL VISIT (OUTPATIENT)
Dept: INTERNAL MEDICINE CLINIC | Age: 66
End: 2021-12-22
Payer: MEDICARE

## 2021-12-22 DIAGNOSIS — R63.0 DECREASED APPETITE: ICD-10-CM

## 2021-12-22 DIAGNOSIS — R82.90 ABNORMAL URINE ODOR: ICD-10-CM

## 2021-12-22 DIAGNOSIS — K21.9 GASTROESOPHAGEAL REFLUX DISEASE WITHOUT ESOPHAGITIS: ICD-10-CM

## 2021-12-22 DIAGNOSIS — R11.0 NAUSEA: Primary | ICD-10-CM

## 2021-12-22 PROCEDURE — 1090F PRES/ABSN URINE INCON ASSESS: CPT | Performed by: NURSE PRACTITIONER

## 2021-12-22 PROCEDURE — G8432 DEP SCR NOT DOC, RNG: HCPCS | Performed by: NURSE PRACTITIONER

## 2021-12-22 PROCEDURE — 99213 OFFICE O/P EST LOW 20 MIN: CPT | Performed by: NURSE PRACTITIONER

## 2021-12-22 PROCEDURE — G8417 CALC BMI ABV UP PARAM F/U: HCPCS | Performed by: NURSE PRACTITIONER

## 2021-12-22 PROCEDURE — G8536 NO DOC ELDER MAL SCRN: HCPCS | Performed by: NURSE PRACTITIONER

## 2021-12-22 PROCEDURE — G8399 PT W/DXA RESULTS DOCUMENT: HCPCS | Performed by: NURSE PRACTITIONER

## 2021-12-22 PROCEDURE — G8756 NO BP MEASURE DOC: HCPCS | Performed by: NURSE PRACTITIONER

## 2021-12-22 PROCEDURE — 3017F COLORECTAL CA SCREEN DOC REV: CPT | Performed by: NURSE PRACTITIONER

## 2021-12-22 PROCEDURE — G9899 SCRN MAM PERF RSLTS DOC: HCPCS | Performed by: NURSE PRACTITIONER

## 2021-12-22 PROCEDURE — 1101F PT FALLS ASSESS-DOCD LE1/YR: CPT | Performed by: NURSE PRACTITIONER

## 2021-12-22 PROCEDURE — G8427 DOCREV CUR MEDS BY ELIG CLIN: HCPCS | Performed by: NURSE PRACTITIONER

## 2021-12-22 RX ORDER — ONDANSETRON 4 MG/1
4 TABLET, ORALLY DISINTEGRATING ORAL
Qty: 15 TABLET | Refills: 0 | Status: SHIPPED
Start: 2021-12-22 | End: 2022-01-24

## 2021-12-22 RX ORDER — PANTOPRAZOLE SODIUM 20 MG/1
20 TABLET, DELAYED RELEASE ORAL
Qty: 60 TABLET | Refills: 0 | Status: SHIPPED | OUTPATIENT
Start: 2021-12-22 | End: 2022-01-24 | Stop reason: SDUPTHER

## 2021-12-22 NOTE — PROGRESS NOTES
[facility] [address], [facility]  [phone] YQVPPS/[phone] fax    12/22/2021    HPI:   Maricruz Perrin 1955 is a pleasant WHITE/NON- female who presents virtual today for a sick visit. For the past week patient has been experiencing a \"yucky stomach\" that is causing nausea. She has a loss of appetite due to the nausea. She denies vomiting, abdominal pain, melena, diarrhea, constipation, reflux or burning, or fullness. Patient states she has lost 15 pounds in 5 weeks. She last weighed at the orthopedic office on 12/20/2020 2194 pounds. She is also experiencing odor to her urine. She denies dysuria, frequency, nocturia. She underwent a total knee replacement November 2021 under [facility]. She is now ambulating without a cane. Past Medical History:   Diagnosis Date    Anxiety 4/5/2021    Diabetes ([facility]     Essential hypertension 4/5/2021    Hyperlipidemia 4/5/2021    OAB (overactive bladder) 4/5/2021    Parkinson's disease ([facility] 4/5/2021    Primary osteoarthritis of both knees 4/5/2021    Radiculopathy of arm 4/5/2021    RLS (restless legs syndrome) 4/5/2021    Sleep apnea 4/5/2021     No past surgical history on file. Current Outpatient Medications   Medication Sig    ondansetron (ZOFRAN ODT) 4 mg disintegrating tablet Take 1 Tablet by mouth every eight (8) hours as needed for Nausea or Nausea or Vomiting.  pantoprazole (PROTONIX) 20 mg tablet Take 1 Tablet by mouth two (2) times daily as needed for Nausea or PRN Reason (Other) (GERD).  carbidopa-levodopa (Sinemet)  mg per tablet Take 1.5 Tablets by mouth three (3) times daily for 30 days.     atorvastatin (LIPITOR) 20 mg tablet TAKE 1 TABLET EVERY OTHER DAY FOR HIGH CHOLESTEROL AND HIGH TRIGLYCERIDES    citalopram (CELEXA) 20 mg tablet TAKE 1 TABLET EVERY DAY    pramipexole (Mirapex) 1.5 mg tablet Take 1 Tablet by mouth nightly for 90 days.  gabapentin (NEURONTIN) 300 mg capsule Take 1 Capsule by mouth nightly for 90 days. Max Daily Amount: 300 mg.    metFORMIN (GLUCOPHAGE) 500 mg tablet Take 1 Tablet by mouth daily (with breakfast).  ibuprofen (MOTRIN) 800 mg tablet as needed.  aspirin delayed-release 81 mg tablet Take  by mouth daily. No current facility-administered medications for this visit. Allergies and Intolerances: Allergies   Allergen Reactions    Latex Rash and Itching    Sulfa (Sulfonamide Antibiotics) Anaphylaxis     Family History:   Family History   Problem Relation Age of Onset    Thyroid Disease Mother     Hypertension Mother     Diabetes Brother     Diabetes Brother     Heart Attack Brother      Social History:   She  reports that she has never smoked. She has never used smokeless tobacco.   Social History     Substance and Sexual Activity   Alcohol Use Not Currently     Immunization History:  Immunization History   Administered Date(s) Administered    COVID-19, J[employer] Sites, Primary or Immunocompromised Series, MRNA, PF, 100mcg/0.5mL 02/15/2021, 06/14/2021    Influenza, Quadrivalent, Adjuvanted (>65 Yrs FLUAD QUAD [id number]) 10/22/2021    Pneumococcal Polysaccharide (PPSV-23) 04/02/2021       Review of Systems:   As above included in HPI. Otherwise 11 point review of systems negative including constitutional, skin, HENT, eyes, respiratory, cardiovascular, gastrointestinal, genitourinary, musculoskeletal, endocrine, hematologic, allergy, and neurologic. Physical:   Exam:   Vital Signs: (As obtained by patient/caregiver at home)  There were vitals taken for this visit.      PHYSICAL EXAMINATION:  [ INSTRUCTIONS:  \"[x]\" Indicates a positive item  \"[]\" Indicates a negative item  -- DELETE ALL ITEMS NOT EXAMINED]      Constitutional: [x] Appears well-developed and well-nourished [x] No apparent distress      [] Abnormal -     Mental status: [x] Alert and awake  [x] Oriented to person/place/time [x] Able to follow commands    [] Abnormal -     Eyes:   EOM    [x]  Normal    [] Abnormal -   Sclera  [x]  Normal    [] Abnormal -          Discharge [x]  None visible   [] Abnormal -     HENT, Neck: [x] Normocephalic, atraumatic  [] Abnormal - . [x] Mouth/Throat: Mucous membranes are moist    Pulmonary/Chest: [x] Respiratory effort normal   [x] No visualized signs of difficulty breathing or respiratory distress        [] Abnormal -      Musculoskeletal:   [] Normal gait with no signs of ataxia         [] Normal range of motion of neck        [] Abnormal -unable to assess    Neurological:        [x] No Facial Asymmetry (Cranial nerve 7 motor function) (limited exam due to video visit)          [x] No gaze palsy        [] Abnormal -          Skin:        [] No significant exanthematous lesions or discoloration noted on facial skin         [] Abnormal -unable to assess           Psychiatric:       [x] Normal Affect [] Abnormal -        [x] No Hallucinations        Impression:  Patient Active Problem List   Diagnosis Code    Hyperlipidemia E78.5    Essential hypertension I10    Parkinson's disease ([facility] G20    Anxiety F41.9    Sleep apnea G47.30    OAB (overactive bladder) N32.81    Primary osteoarthritis of both knees M17.0    RLS (restless legs syndrome) G25.81    Radiculopathy of arm M54.10    Type 2 diabetes mellitus with hyperglycemia, without long-term current use of insulin (HCC) E11.65    Sick sinus syndrome (HCC) I49.5    Pacemaker Z95.0    Atrial fibrillation (HCC) I48.91    Ventricular tachycardia (HCC) I47.2    CKD (chronic kidney disease) N18.9    BPH (benign prostatic hyperplasia) N40.0    [facility] C61       Plan:    ICD-10-CM ICD-9-CM    1. Nausea  R11.0 787.02 ondansetron (ZOFRAN ODT) 4 mg disintegrating tablet   2. Decreased appetite  R63.0 783.0    3. Gastroesophageal reflux disease without esophagitis  K21.9 530.81 pantoprazole (PROTONIX) 20 mg tablet   4.  Abnormal urine odor R82.90 791.9      -Nausea/decreased appetite  Initiate Zofran to take as needed  Instructed pt to consume a BRAT diet while stomach illness is still present then progress to a regular diet as tolerated as symptoms claude. Instructed pt to avoid diary products and juice as this may worsen the symptoms. Call office if symptoms worsen or dont improve in the next few days. Patient verbalized understanding. Differential diagnosis:  Uncontrolled GERD  Medication side effects i.e. Celexa  Anxiety    -GERD  Increase pantoprazole 20 mg to twice daily  Patient is currently taking this daily  Call office in 1 week if not better. Will then refer to GI for further evaluation.    -Abnormal urine odor  To the office tomorrow morning to urine sample. Dr. Jet Hurd, LORELEIP-C, DNP  [facility] [address] [place], [place] [address]Th [facility], who was evaluated through a synchronous (real-time) audio-video encounter, and/or her healthcare decision maker, is aware that it is a billable service, with coverage as determined by her insurance carrier. She provided verbal consent to proceed: Yes, and patient identification was verified. It was conducted pursuant to the emergency declaration under the [facility], [address] [place] [address] authority and the [employer] Act. A caregiver was present when appropriate. Ability to conduct physical exam was limited. I was in the office. The patient was at home.

## 2021-12-23 ENCOUNTER — LAB ONLY (OUTPATIENT)
Dept: INTERNAL MEDICINE CLINIC | Age: 66
End: 2021-12-23
Payer: MEDICARE

## 2021-12-23 DIAGNOSIS — R30.0 DYSURIA: Primary | ICD-10-CM

## 2021-12-23 LAB
BILIRUB UR QL STRIP: NEGATIVE
GLUCOSE UR-MCNC: NEGATIVE MG/DL
KETONES P FAST UR STRIP-MCNC: NEGATIVE MG/DL
PH UR STRIP: 7 [PH] (ref 4.6–8)
PROT UR QL STRIP: NEGATIVE
SP GR UR STRIP: 1.02 (ref 1–1.03)
UA UROBILINOGEN AMB POC: NORMAL (ref 0.2–1)
URINALYSIS CLARITY POC: CLEAR
URINALYSIS COLOR POC: YELLOW
URINE BLOOD POC: NEGATIVE
URINE LEUKOCYTES POC: NEGATIVE
URINE NITRITES POC: NEGATIVE

## 2021-12-23 PROCEDURE — 81001 URINALYSIS AUTO W/SCOPE: CPT | Performed by: NURSE PRACTITIONER

## 2021-12-24 NOTE — PROGRESS NOTES
Please inform pt her urine is negative. She may be dealing with yeast infection. Try OTC monistat.  Thank you

## 2021-12-25 LAB
BACTERIA UR CULT: NORMAL
CHOLEST SERPL-MCNC: 93 MG/DL (ref 100–199)
EST. AVERAGE GLUCOSE BLD GHB EST-MCNC: 117 MG/DL
HBA1C MFR BLD: 5.7 % (ref 4.8–5.6)
HDLC SERPL-MCNC: 32 MG/DL
IMP & REVIEW OF LAB RESULTS: NORMAL
LDLC SERPL CALC-MCNC: 40 MG/DL (ref 0–99)
TRIGL SERPL-MCNC: 111 MG/DL (ref 0–149)
VLDLC SERPL CALC-MCNC: 21 MG/DL (ref 5–40)

## 2021-12-27 NOTE — PROGRESS NOTES
Patient reached and made aware of urine results and message per Dr. Mely Ortiz. Patient verbalized understanding.

## 2022-01-03 ENCOUNTER — TELEPHONE (OUTPATIENT)
Dept: INTERNAL MEDICINE CLINIC | Age: 67
End: 2022-01-03

## 2022-01-03 NOTE — TELEPHONE ENCOUNTER
Received call from patients daughter, she was upset that noone responded to her refill request for the parkinson's medication on 12-16-21. Patients daugther informed that we routed this to her neurologist who is the prescribing doctor (DR. Gonzalez). This was filled and [place] [facility] [place]. She states that the pharmacy never received it and again she is upset that we never called her back that we wouldn't handle it. I apologized to the daughter that she didn't receive a call, but the message was handled same day. There is no encounter of her calling the neurology office since. She was advised to call them to make sure medication has been sent. She also states that she would like in the future for her mothers doctor to be able to handle a medication refill whenever she request it.

## 2022-01-04 NOTE — TELEPHONE ENCOUNTER
Patients daughter reached, and informed that we spoke with mike kennedy and the rx was on hold because the rx was filled on 12-13-21.

## 2022-01-08 DIAGNOSIS — R92.8 ABNORMAL MAMMOGRAM OF LEFT BREAST: ICD-10-CM

## 2022-01-24 ENCOUNTER — VIRTUAL VISIT (OUTPATIENT)
Dept: INTERNAL MEDICINE CLINIC | Age: 67
End: 2022-01-24
Payer: MEDICARE

## 2022-01-24 DIAGNOSIS — Z96.651 STATUS POST TOTAL KNEE REPLACEMENT, RIGHT: ICD-10-CM

## 2022-01-24 DIAGNOSIS — G25.81 RLS (RESTLESS LEGS SYNDROME): ICD-10-CM

## 2022-01-24 DIAGNOSIS — E11.65 TYPE 2 DIABETES MELLITUS WITH HYPERGLYCEMIA, WITHOUT LONG-TERM CURRENT USE OF INSULIN (HCC): Primary | ICD-10-CM

## 2022-01-24 DIAGNOSIS — M17.12 PRIMARY OSTEOARTHRITIS OF LEFT KNEE: ICD-10-CM

## 2022-01-24 DIAGNOSIS — F41.9 ANXIETY: ICD-10-CM

## 2022-01-24 DIAGNOSIS — G47.30 SLEEP APNEA, UNSPECIFIED TYPE: ICD-10-CM

## 2022-01-24 DIAGNOSIS — E78.5 HYPERLIPIDEMIA, UNSPECIFIED HYPERLIPIDEMIA TYPE: ICD-10-CM

## 2022-01-24 DIAGNOSIS — G20 PARKINSON DISEASE (HCC): ICD-10-CM

## 2022-01-24 DIAGNOSIS — K21.9 GASTROESOPHAGEAL REFLUX DISEASE WITHOUT ESOPHAGITIS: ICD-10-CM

## 2022-01-24 PROCEDURE — 99422 OL DIG E/M SVC 11-20 MIN: CPT | Performed by: NURSE PRACTITIONER

## 2022-01-24 RX ORDER — ATORVASTATIN CALCIUM 20 MG/1
TABLET, FILM COATED ORAL
Qty: 45 TABLET | Refills: 1 | Status: SHIPPED | OUTPATIENT
Start: 2022-01-24 | End: 2022-07-18 | Stop reason: SDUPTHER

## 2022-01-24 RX ORDER — PANTOPRAZOLE SODIUM 20 MG/1
20 TABLET, DELAYED RELEASE ORAL
Qty: 180 TABLET | Refills: 1 | Status: SHIPPED | OUTPATIENT
Start: 2022-01-24 | End: 2022-06-30

## 2022-01-24 RX ORDER — METFORMIN HYDROCHLORIDE 500 MG/1
500 TABLET ORAL
Qty: 180 TABLET | Refills: 0 | Status: SHIPPED | OUTPATIENT
Start: 2022-01-24 | End: 2022-06-30

## 2022-01-24 RX ORDER — CITALOPRAM 20 MG/1
20 TABLET, FILM COATED ORAL DAILY
Qty: 90 TABLET | Refills: 1 | Status: SHIPPED | OUTPATIENT
Start: 2022-01-24 | End: 2022-03-31 | Stop reason: DRUGHIGH

## 2022-01-24 RX ORDER — PRAMIPEXOLE DIHYDROCHLORIDE 1.5 MG/1
1.5 TABLET ORAL 3 TIMES DAILY
Qty: 1 TABLET | Refills: 0
Start: 2022-01-24 | End: 2022-07-18 | Stop reason: SDUPTHER

## 2022-01-24 NOTE — PROGRESS NOTES
Chief Complaint   Patient presents with    Follow-up     6 months    Labs     completed 12/23/2021     1. \"Have you been to the ER, urgent care clinic since your last visit? Hospitalized since your last visit? \" No    2. \"Have you seen or consulted any other health care providers outside of the [address] [place] [address] since your last visit? \" Yes, Ortho. 3. For patients aged 39-70: Has the patient had a colonoscopy / FIT/ Cologuard? NO. If the patient is female:    4. For patients aged 41-77: Has the patient had a mammogram within the past 2 years? Yes - no Care Gap present  See top three    5. For patients aged 21-65: Has the patient had a pap smear?  NA - based on age or sex

## 2022-01-24 NOTE — PROGRESS NOTES
[facility]ists of [address] [facility] [place], [address]  [phone] HDZXOV/[phone] fax    1/24/2022    HPI:   Renata Andre 1955 is a pleasant WHITE/NON- female who presents virtually today for routine follow up. She moved to [place] from [place] in March 2021. She is a native of [address] [place] [address]. Past Medical History:   Diagnosis Date    Anxiety 4/5/2021    Diabetes ([facility] [address]     Essential hypertension 4/5/2021    Hyperlipidemia 4/5/2021    OAB (overactive bladder) 4/5/2021    Parkinson's disease ([facility] [address] [facility] 4/5/2021    Primary osteoarthritis of both knees 4/5/2021    Radiculopathy of arm 4/5/2021    RLS (restless legs syndrome) 4/5/2021    Sleep apnea 4/5/2021     History reviewed. No pertinent surgical history. Current Outpatient Medications   Medication Sig    metFORMIN (GLUCOPHAGE) 500 mg tablet Take 1 Tablet by mouth daily (with breakfast).  atorvastatin (LIPITOR) 20 mg tablet TAKE 1 TABLET EVERY OTHER DAY FOR HIGH CHOLESTEROL AND HIGH TRIGLYCERIDES    citalopram (CELEXA) 20 mg tablet Take 1 Tablet by mouth daily.  pantoprazole (PROTONIX) 20 mg tablet Take 1 Tablet by mouth two (2) times daily as needed for Nausea or PRN Reason (Other) (GERD).  pramipexole (Mirapex) 1.5 mg tablet Take 1 Tablet by mouth three (3) times daily. Indications: restless legs syndrome, an extreme discomfort in the calf muscles when sitting or lying down    ibuprofen (MOTRIN) 800 mg tablet as needed.  aspirin delayed-release 81 mg tablet Take  by mouth daily. No current facility-administered medications for this visit. Allergies and Intolerances:    Allergies   Allergen Reactions    Latex Rash and Itching    Sulfa (Sulfonamide Antibiotics) Anaphylaxis     Family History:   Family History   Problem Relation Age of Onset    Thyroid Disease Mother     Hypertension Mother     Diabetes Brother     Diabetes Brother     Heart Attack Brother Social History:   She  reports that she has never smoked. She has never used smokeless tobacco.   Social History     Substance and Sexual Activity   Alcohol Use Not Currently     Immunization History:  Immunization History   Administered Date(s) Administered    COVID-19, Moderna Booster, PF, 0.25mL Dose 12/30/2021    COVID-19, Claudeen Fermo, Primary or Immunocompromised Series, MRNA, PF, 100mcg/0.5mL 02/15/2021, 06/14/2021    Influenza, Quadrivalent, Adjuvanted (>65 Yrs FLUAD QUAD [id number]) 10/22/2021    Pneumococcal Polysaccharide (PPSV-23) 04/02/2021       Todays concern:  1. Right total knee replacement. Having no pain or difficulty while ambulating. Does not require any ambulatory device. 2.  Left knee arthritis. She is scheduled for a total replacement 3/4/2022. She is taking Motrin 800 mg twice daily. 3.  Upper stomach pain. Developed on Saturday with nausea and vomiting. Nausea and vomiting have dissipated. She notices this pain after she eats. No particular food causes this discomfort. This occurs intermittently. 4.  Intentional weight loss. She has lost 40 pounds in the past 6 months. She is no longer consuming proteins or other meats. She is only eating vegetables and on occasion she will have salmon and shrimp. 5.  Colonoscopy was scheduled in December but due to her knee replacement she had to cancel. She will reschedule. 6.  GERD. Taking pantoprazole 20 mg twice daily as effective for her symptoms. Diabetes: taking Metformin 500mg every day. Denies s/e or complications with medication. She does not check her BS. Parkinson's disease: diagnosed in 2014. She sees her neurologist on a regular basis. She continues Sinemet. No longer requires gabapentin. Hypertension: Diagnosed in 2019. Due to multiple low blood pressure readings her hypertensive medications were discontinued. Cholesterol: Diagnosed in 2016.   She continues atorvastatin 20 mg daily and tolerating therapy well.      Anxiety: In 2001 she fell and fractured her ankle. Since this fall she has experienced great anxiety. She was seeing a psychiatrist in [place] who placed her on Prozac 20 mg plus Prozac 40 mg every day which was effective until April 2021. Prozac was discontinued she was placed on Celexa therapy at that time and is doing well. Sleep apnea: Diagnosed in 2010. She is currently on CPAP.    RLS: Diagnosed 2019. Mirapex nightly has been effective for her symptoms. GERD: Taking pantoprazole 20mg twice daily. Therapy effective. Bilateral knee primary osteoarthritis: She had received a total of 6 injections in the past to include gel to both of her knees. Her last injection was in March 2021. She underwent RTKR Nov 2021 [facility]. OAB: Diagnosed many years ago. She used to take oxybutynin but is no longer requiring this treatment. Patient requested all medications go to [facility] mail in pharmacy      Review of Systems:   As above included in HPI. Otherwise 11 point review of systems negative including constitutional, skin, HENT, eyes, respiratory, cardiovascular, gastrointestinal, genitourinary, musculoskeletal, endocrine, hematologic, allergy, and neurologic. Physical:   Exam:   Vital Signs: (As obtained by patient/caregiver at home)  There were not vitals taken for this visit. PHYSICAL EXAMINATION:  [ INSTRUCTIONS:  \"[x]\" Indicates a positive item  \"[]\" Indicates a negative item  -- DELETE ALL ITEMS NOT EXAMINED]      Constitutional: [x] Appears well-developed and well-nourished [x] No apparent distress      [] Abnormal -     Mental status: [x] Alert and awake  [x] Oriented to person/place/time [x] Able to follow commands    [] Abnormal -     Eyes:   EOM    [x]  Normal    [] Abnormal -   Sclera  [x]  Normal    [] Abnormal -          Discharge [x]  None visible   [] Abnormal -     HENT, Neck: [x] Normocephalic, atraumatic  [] Abnormal - .   [x] Mouth/Throat: Mucous membranes are moist    Pulmonary/Chest: [x] Respiratory effort normal   [x] No visualized signs of difficulty breathing or respiratory distress        [] Abnormal -      Musculoskeletal:   [x] Normal gait with no signs of ataxia; patient able to stand from a seated position and ambulate a few feet without difficulty. [x] Normal range of motion of neck        [] Abnormal -        Skin:        [x] No significant exanthematous lesions or discoloration noted on facial skin         [] Abnormal -            Psychiatric:       [x] Normal Affect [] Abnormal -        [x] No Hallucinations    Review of Data:  Labs reviewed:   LDL 40; TG 11  A1c 6.1-5.7      Plan:    ICD-10-CM ICD-9-CM    1. Type 2 diabetes mellitus with hyperglycemia, without long-term current use of insulin (HCC)  E11.65 250.00 metFORMIN (GLUCOPHAGE) 500 mg tablet     790.29    2. Hyperlipidemia, unspecified hyperlipidemia type  E78.5 272.4 atorvastatin (LIPITOR) 20 mg tablet   3. Gastroesophageal reflux disease without esophagitis  K21.9 530.81 pantoprazole (PROTONIX) 20 mg tablet   4. Anxiety  F41.9 300.00 citalopram (CELEXA) 20 mg tablet   5. Parkinson disease ([email or web address]  G20 332.0    6. Sleep apnea, unspecified type  G47.30 780.57    7. RLS (restless legs syndrome)  G25.81 333.94 pramipexole (Mirapex) 1.5 mg tablet   8. Status post total knee replacement, right  Z96.651 V43.65    9. Primary osteoarthritis of left knee  M17.12 715.16      -Type 2 diabetes  A1c 6.1-5.7  Continue Metformin 500 mg daily    -Hyperlipidemia  LDL 40;   Continue atorvastatin 20 mg every other day    -GERD  Continue pantoprazole 20 mg twice daily  Instructed patient to only take pantoprazole as needed and not on scheduled basis.   Avoid foods that may flare reflux symptoms    -Anxiety  Continue Celexa therapy    -Parkinson's disease  Continue to follow neurology  Continue all medication as prescribed    -Sleep apnea  Continue use of CPAP    -RLS  Discontinued gabapentin (patient request)  Continue Mirapex as prescribed by neurology    -Status post total knee replacement, right  Follow-up with Ortho as scheduled. -Primary osteoarthritis of left knee  Scheduled for a total left knee replacement 3/4/2022 under [place] [id number] group. Reviewed medication and completed the medication reconciliation with the patient. Reviewed side effects of medications with the patient. Questions were answered and patient verb understanding. informed patient chronic medication refills will not be given between their scheduled appointments; all refills will be given at the time of their scheduled follow-up appointments. Patient verbalized understanding      Follow up 6 months AWV with labs (lipid, CMP, A1c, microalbumin)      Dr. Meño Villar, ADRI-C, DNP  [facility]       The total time was 15 minutes. Greater than 50% of that time was spent in counseling and/or coordination of care. My summary of patient counseling and coordination of care includes reviewing medical record, lab review, assessing patient, placing orders, and discussing plan of care with patient. Roger Soto, who was evaluated through a synchronous (real-time) audio-video encounter, and/or her healthcare decision maker, is aware that it is a billable service, which includes applicable co-pays, with coverage as determined by her insurance carrier. She provided verbal consent to proceed: Yes, and patient identification was verified. This visit was conducted pursuant to the emergency declaration under the [facility], [address] [place] [address] [facility] authority and the [employer] General Act. A caregiver was present when appropriate. Ability to conduct physical exam was limited. The patient was located in a state where the provider was licensed to provide care.      --Harjinder Chavarria DNP on 1/24/2022 at 1:06 PM

## 2022-02-01 ENCOUNTER — TELEPHONE (OUTPATIENT)
Dept: INTERNAL MEDICINE CLINIC | Age: 67
End: 2022-02-01

## 2022-02-01 NOTE — TELEPHONE ENCOUNTER
Pharmacist Shila Regalado from [facility] calling wanting clarification for pt' s recent prescription for pantoprazole. Pharmacy states this medication is not usually used for nausea. If appropriate or needs to be changed, please advise. I will call [employer] back with clarification or we can fax document to company.

## 2022-02-03 NOTE — TELEPHONE ENCOUNTER
[facility] and clarified pantoprazole 20mg Take 1 Tablet by mouth two (2) times daily as needed for Nausea or PRN Reason (Other) (GERD) per Dr. Aide Stephen

## 2022-02-18 ENCOUNTER — OFFICE VISIT (OUTPATIENT)
Dept: INTERNAL MEDICINE CLINIC | Age: 67
End: 2022-02-18
Payer: MEDICARE

## 2022-02-18 VITALS
TEMPERATURE: 97 F | RESPIRATION RATE: 16 BRPM | WEIGHT: 185 LBS | OXYGEN SATURATION: 96 % | BODY MASS INDEX: 34.04 KG/M2 | DIASTOLIC BLOOD PRESSURE: 70 MMHG | HEIGHT: 62 IN | HEART RATE: 74 BPM | SYSTOLIC BLOOD PRESSURE: 131 MMHG

## 2022-02-18 DIAGNOSIS — H61.23 CERUMEN DEBRIS ON TYMPANIC MEMBRANE OF BOTH EARS: ICD-10-CM

## 2022-02-18 DIAGNOSIS — H91.93 DECREASED HEARING OF BOTH EARS: ICD-10-CM

## 2022-02-18 DIAGNOSIS — Z01.818 PREOPERATIVE CLEARANCE: Primary | ICD-10-CM

## 2022-02-18 DIAGNOSIS — A50.9 CONGENITAL SYPHILIS: ICD-10-CM

## 2022-02-18 DIAGNOSIS — D69.6 THROMBOCYTOPENIA (HCC): ICD-10-CM

## 2022-02-18 PROCEDURE — G8754 DIAS BP LESS 90: HCPCS | Performed by: NURSE PRACTITIONER

## 2022-02-18 PROCEDURE — 99213 OFFICE O/P EST LOW 20 MIN: CPT | Performed by: NURSE PRACTITIONER

## 2022-02-18 PROCEDURE — G8399 PT W/DXA RESULTS DOCUMENT: HCPCS | Performed by: NURSE PRACTITIONER

## 2022-02-18 PROCEDURE — G8417 CALC BMI ABV UP PARAM F/U: HCPCS | Performed by: NURSE PRACTITIONER

## 2022-02-18 PROCEDURE — G8432 DEP SCR NOT DOC, RNG: HCPCS | Performed by: NURSE PRACTITIONER

## 2022-02-18 PROCEDURE — G8427 DOCREV CUR MEDS BY ELIG CLIN: HCPCS | Performed by: NURSE PRACTITIONER

## 2022-02-18 PROCEDURE — G9899 SCRN MAM PERF RSLTS DOC: HCPCS | Performed by: NURSE PRACTITIONER

## 2022-02-18 PROCEDURE — 1101F PT FALLS ASSESS-DOCD LE1/YR: CPT | Performed by: NURSE PRACTITIONER

## 2022-02-18 PROCEDURE — 3017F COLORECTAL CA SCREEN DOC REV: CPT | Performed by: NURSE PRACTITIONER

## 2022-02-18 PROCEDURE — G8536 NO DOC ELDER MAL SCRN: HCPCS | Performed by: NURSE PRACTITIONER

## 2022-02-18 PROCEDURE — G8752 SYS BP LESS 140: HCPCS | Performed by: NURSE PRACTITIONER

## 2022-02-18 PROCEDURE — 1090F PRES/ABSN URINE INCON ASSESS: CPT | Performed by: NURSE PRACTITIONER

## 2022-02-18 NOTE — PROGRESS NOTES
Subjective:   I am seeing Tray Urbina 1955  is a pleasant WHITE/NON- female for preop exam.    Planned surgery: Total knee replacement left knee. Date 3/4/2022. Provider Dr Lluvia Lai. ROS: No recent infections, has been feeling well other than presenting surgical complaint. No CNS, cardiorespiratory or GI symptoms otherwise. Recently underwent RTKR Nov 2021 and has recuperated well. Complaint today is katy ears clogged up. Decreased hearing. Seeking referral to ENT. Past Medical History:   Diagnosis Date    Anxiety 4/5/2021    Diabetes ([facility] [address]     Essential hypertension 4/5/2021    Hyperlipidemia 4/5/2021    OAB (overactive bladder) 4/5/2021    Parkinson's disease ([facility] [address] 4/5/2021    Primary osteoarthritis of both knees 4/5/2021    Radiculopathy of arm 4/5/2021    RLS (restless legs syndrome) 4/5/2021    Sleep apnea 4/5/2021         Current Outpatient Medications on File Prior to Visit   Medication Sig Dispense Refill    metFORMIN (GLUCOPHAGE) 500 mg tablet Take 1 Tablet by mouth daily (with breakfast). 180 Tablet 0    atorvastatin (LIPITOR) 20 mg tablet TAKE 1 TABLET EVERY OTHER DAY FOR HIGH CHOLESTEROL AND HIGH TRIGLYCERIDES 45 Tablet 1    citalopram (CELEXA) 20 mg tablet Take 1 Tablet by mouth daily. 90 Tablet 1    pantoprazole (PROTONIX) 20 mg tablet Take 1 Tablet by mouth two (2) times daily as needed for Nausea or PRN Reason (Other) (GERD). 180 Tablet 1    pramipexole (Mirapex) 1.5 mg tablet Take 1 Tablet by mouth three (3) times daily. Indications: restless legs syndrome, an extreme discomfort in the calf muscles when sitting or lying down 1 Tablet 0    ibuprofen (MOTRIN) 800 mg tablet as needed. (Patient not taking: Reported on 2/18/2022)      aspirin delayed-release 81 mg tablet Take  by mouth daily. (Patient not taking: Reported on 2/18/2022)       No current facility-administered medications on file prior to visit.          Allergies   Allergen Reactions    Latex Rash and Itching    Sulfa (Sulfonamide Antibiotics) Anaphylaxis         Objective:     Visit Vitals  /70   Pulse 74   Temp 97 °F (36.1 °C) (Temporal)   Resp 16   Ht 5' 2\" (1.575 m)   Wt 185 lb (83.9 kg)   SpO2 96%   BMI 33.84 kg/m²        The physical exam is unremarkable. Patient appears well, alert and oriented, pleasant and cooperative. Vitals as noted. Lungs are clear to auscultation. Heart sounds are normal.      Assessment/Plan:       ICD-10-CM ICD-9-CM    1. Preoperative clearance  Z01.818 V72.84    2. Thrombocytopenia (HCC)  X97.2 614.8 METABOLIC PANEL, COMPREHENSIVE   3. Congenital syphilis  A50.9 090.9    4. Cerumen debris on tympanic membrane of both ears  H61.23 380.4 REFERRAL TO ENT-OTOLARYNGOLOGY   5. Decreased hearing of both ears  H91.93 389.9 REFERRAL TO ENT-OTOLARYNGOLOGY     Reviewed labs available for examination, EKG available for examination, CXR not available for examination. Noted Platelet count to be low at 102-was taking ASA and NSAID therapy. After stopping NSAIDs and ASA, her count has normalized. Reactive syphilis. 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 Felt to be average risk for the planned procedure. No contraindications noted. Routine post prophylaxis as per protocol. Patient may proceed with planned surgery. All questions to be answered, risks and benefits to be explained, and consent to be obtained via surgeon and their staff. Note edited on 3/1/2022 LAB UPDATE:    Thrombocytopenia resolved. Dr Estelle Olivares, MG  [facility]      The total time 24   minutes. At least 50% of that time was spent in counseling and/or coordination of care. My summary of patient counseling and coordination of care includes reviewing medical record, assessing patient, placing orders, and discussing plan of care with patient.  Prior to seeing this patient, I reviewed records, including previously completed appointments/records, reviewed specialty records in [facility], and updated visits from other providers since I saw the patient last.

## 2022-02-18 NOTE — Clinical Note
Please send updated preop note to the surgeon. Also please inform the daughter/patient of the okay for surgery.   Thank you

## 2022-02-18 NOTE — PROGRESS NOTES
Chief Complaint   Patient presents with    Pre-op Exam     Left Total Knee Replacement 3/4/2022 With Dr. Triny Olivares     1. \"Have you been to the ER, urgent care clinic since your last visit? Hospitalized since your last visit? \" No    2. \"Have you seen or consulted any other health care providers outside of the [address] [place] [address] since your last visit? \" Yes, Ortho and Neurologist     3. For patients aged 39-70: Has the patient had a colonoscopy / FIT/ Cologuard? No      If the patient is female:     4. For patients aged 41-77: Has the patient had a mammogram within the past 2 years? NA - based on age or sex      11. For patients aged 21-65: Has the patient had a pap smear?  NA - based on age or sex

## 2022-02-25 ENCOUNTER — HOSPITAL ENCOUNTER (OUTPATIENT)
Dept: MAMMOGRAPHY | Age: 67
Discharge: HOME | End: 2022-02-25
Attending: NURSE PRACTITIONER
Payer: MEDICARE

## 2022-02-25 ENCOUNTER — HOSPITAL ENCOUNTER (OUTPATIENT)
Dept: ULTRASOUND IMAGING | Age: 67
End: 2022-02-25
Attending: NURSE PRACTITIONER
Payer: MEDICARE

## 2022-02-25 ENCOUNTER — APPOINTMENT (OUTPATIENT)
Dept: INTERNAL MEDICINE CLINIC | Age: 67
End: 2022-02-25

## 2022-02-25 DIAGNOSIS — R92.8 ABNORMAL MAMMOGRAM OF LEFT BREAST: ICD-10-CM

## 2022-02-25 PROCEDURE — 77061 BREAST TOMOSYNTHESIS UNI: CPT

## 2022-02-26 LAB
ALBUMIN SERPL-MCNC: 3.9 G/DL (ref 3.8–4.8)
ALBUMIN/GLOB SERPL: 1.3 {RATIO} (ref 1.2–2.2)
ALP SERPL-CCNC: 142 IU/L (ref 44–121)
ALT SERPL-CCNC: 23 IU/L (ref 0–32)
AST SERPL-CCNC: 26 IU/L (ref 0–40)
BILIRUB SERPL-MCNC: <0.2 MG/DL (ref 0–1.2)
BUN SERPL-MCNC: 8 MG/DL (ref 8–27)
BUN/CREAT SERPL: 13 (ref 12–28)
CALCIUM SERPL-MCNC: 9.4 MG/DL (ref 8.7–10.3)
CHLORIDE SERPL-SCNC: 101 MMOL/L (ref 96–106)
CO2 SERPL-SCNC: 24 MMOL/L (ref 20–29)
CREAT SERPL-MCNC: 0.62 MG/DL (ref 0.57–1)
GLOBULIN SER CALC-MCNC: 3 G/DL (ref 1.5–4.5)
GLUCOSE SERPL-MCNC: 95 MG/DL (ref 65–99)
POTASSIUM SERPL-SCNC: 4.4 MMOL/L (ref 3.5–5.2)
PROT SERPL-MCNC: 6.9 G/DL (ref 6–8.5)
SODIUM SERPL-SCNC: 140 MMOL/L (ref 134–144)

## 2022-02-27 DIAGNOSIS — R92.8 ABNORMAL MAMMOGRAM OF BOTH BREASTS: Primary | ICD-10-CM

## 2022-02-28 ENCOUNTER — TELEPHONE (OUTPATIENT)
Dept: INTERNAL MEDICINE CLINIC | Age: 67
End: 2022-02-28

## 2022-02-28 ENCOUNTER — APPOINTMENT (OUTPATIENT)
Dept: INTERNAL MEDICINE CLINIC | Age: 67
End: 2022-02-28

## 2022-02-28 DIAGNOSIS — R92.8 ABNORMAL MAMMOGRAM OF LEFT BREAST: ICD-10-CM

## 2022-02-28 DIAGNOSIS — D69.6 THROMBOCYTOPENIA (HCC): Primary | ICD-10-CM

## 2022-02-28 DIAGNOSIS — Z12.31 BREAST CANCER SCREENING BY MAMMOGRAM: Primary | ICD-10-CM

## 2022-02-28 NOTE — TELEPHONE ENCOUNTER
CMP lab drawn in error; need pt to have CBC. davidson Harper reach out to pt and have her complete CBC test ASAP. ICD-10-CM ICD-9-CM    1.  Thrombocytopenia (HCC)  D69.6 287.5 CBC WITH AUTOMATED DIFF

## 2022-02-28 NOTE — TELEPHONE ENCOUNTER
Daughter calling asking if you got the lab results so mother can have her surgery? Says you were needing additional labs before you would sign the med clearance.

## 2022-03-01 LAB
BASOPHILS # BLD AUTO: 0.1 X10E3/UL (ref 0–0.2)
BASOPHILS NFR BLD AUTO: 1 %
EOSINOPHIL # BLD AUTO: 0.5 X10E3/UL (ref 0–0.4)
EOSINOPHIL NFR BLD AUTO: 5 %
ERYTHROCYTE [DISTWIDTH] IN BLOOD BY AUTOMATED COUNT: 13.7 % (ref 11.7–15.4)
HCT VFR BLD AUTO: 38.8 % (ref 34–46.6)
HGB BLD-MCNC: 12.2 G/DL (ref 11.1–15.9)
IMM GRANULOCYTES # BLD AUTO: 0 X10E3/UL (ref 0–0.1)
IMM GRANULOCYTES NFR BLD AUTO: 0 %
LYMPHOCYTES # BLD AUTO: 2.1 X10E3/UL (ref 0.7–3.1)
LYMPHOCYTES NFR BLD AUTO: 22 %
MCH RBC QN AUTO: 27.5 PG (ref 26.6–33)
MCHC RBC AUTO-ENTMCNC: 31.4 G/DL (ref 31.5–35.7)
MCV RBC AUTO: 87 FL (ref 79–97)
MONOCYTES # BLD AUTO: 0.4 X10E3/UL (ref 0.1–0.9)
MONOCYTES NFR BLD AUTO: 4 %
NEUTROPHILS # BLD AUTO: 6.5 X10E3/UL (ref 1.4–7)
NEUTROPHILS NFR BLD AUTO: 68 %
PLATELET # BLD AUTO: 232 X10E3/UL (ref 150–450)
RBC # BLD AUTO: 4.44 X10E6/UL (ref 3.77–5.28)
WBC # BLD AUTO: 9.6 X10E3/UL (ref 3.4–10.8)

## 2022-03-01 NOTE — PROGRESS NOTES
ICD-10-CM ICD-9-CM    1. Breast cancer screening by mammogram  Z12.31 V76.12 LULÚ 3D ANDREEA W MAMMO BI DX INCL CAD   2.  Abnormal mammogram of left breast  R92.8 793.80 LULÚ 3D ANDREEA W MAMMO BI DX INCL CAD

## 2022-03-03 NOTE — TELEPHONE ENCOUNTER
Office note clearing pt for surgery was faxed yesterday. Refaxed again today, confirmation received. LVM alex lo inquiring if there is anything else they need faxed along with office note.

## 2022-03-18 PROBLEM — G47.30 SLEEP APNEA: Status: ACTIVE | Noted: 2021-04-05

## 2022-03-18 PROBLEM — G20.A1 PARKINSON'S DISEASE: Status: ACTIVE | Noted: 2021-04-05

## 2022-03-18 PROBLEM — G20 PARKINSON'S DISEASE (HCC): Status: ACTIVE | Noted: 2021-04-05

## 2022-03-19 PROBLEM — I10 ESSENTIAL HYPERTENSION: Status: ACTIVE | Noted: 2021-04-05

## 2022-03-19 PROBLEM — E78.5 HYPERLIPIDEMIA: Status: ACTIVE | Noted: 2021-04-05

## 2022-03-19 PROBLEM — M17.0 PRIMARY OSTEOARTHRITIS OF BOTH KNEES: Status: ACTIVE | Noted: 2021-04-05

## 2022-03-19 PROBLEM — N32.81 OAB (OVERACTIVE BLADDER): Status: ACTIVE | Noted: 2021-04-05

## 2022-03-19 PROBLEM — M54.10 RADICULOPATHY OF ARM: Status: ACTIVE | Noted: 2021-04-05

## 2022-03-19 PROBLEM — G25.81 RLS (RESTLESS LEGS SYNDROME): Status: ACTIVE | Noted: 2021-04-05

## 2022-03-19 PROBLEM — M17.12 PRIMARY OSTEOARTHRITIS OF LEFT KNEE: Status: ACTIVE | Noted: 2022-01-24

## 2022-03-19 PROBLEM — F41.9 ANXIETY: Status: ACTIVE | Noted: 2021-04-05

## 2022-03-20 PROBLEM — E11.65 TYPE 2 DIABETES MELLITUS WITH HYPERGLYCEMIA, WITHOUT LONG-TERM CURRENT USE OF INSULIN (HCC): Status: ACTIVE | Noted: 2021-04-05

## 2022-03-31 ENCOUNTER — VIRTUAL VISIT (OUTPATIENT)
Dept: INTERNAL MEDICINE CLINIC | Age: 67
End: 2022-03-31
Payer: MEDICARE

## 2022-03-31 DIAGNOSIS — F41.9 ANXIETY: Primary | ICD-10-CM

## 2022-03-31 DIAGNOSIS — R45.1 AGITATION: ICD-10-CM

## 2022-03-31 PROCEDURE — 1090F PRES/ABSN URINE INCON ASSESS: CPT | Performed by: NURSE PRACTITIONER

## 2022-03-31 PROCEDURE — G8427 DOCREV CUR MEDS BY ELIG CLIN: HCPCS | Performed by: NURSE PRACTITIONER

## 2022-03-31 PROCEDURE — G8432 DEP SCR NOT DOC, RNG: HCPCS | Performed by: NURSE PRACTITIONER

## 2022-03-31 PROCEDURE — 99212 OFFICE O/P EST SF 10 MIN: CPT | Performed by: NURSE PRACTITIONER

## 2022-03-31 PROCEDURE — 1101F PT FALLS ASSESS-DOCD LE1/YR: CPT | Performed by: NURSE PRACTITIONER

## 2022-03-31 PROCEDURE — G8399 PT W/DXA RESULTS DOCUMENT: HCPCS | Performed by: NURSE PRACTITIONER

## 2022-03-31 PROCEDURE — G9899 SCRN MAM PERF RSLTS DOC: HCPCS | Performed by: NURSE PRACTITIONER

## 2022-03-31 PROCEDURE — G8756 NO BP MEASURE DOC: HCPCS | Performed by: NURSE PRACTITIONER

## 2022-03-31 PROCEDURE — 3017F COLORECTAL CA SCREEN DOC REV: CPT | Performed by: NURSE PRACTITIONER

## 2022-03-31 RX ORDER — CITALOPRAM 40 MG/1
40 TABLET, FILM COATED ORAL DAILY
Qty: 90 TABLET | Refills: 1 | Status: SHIPPED | OUTPATIENT
Start: 2022-03-31 | End: 2022-07-18 | Stop reason: SDUPTHER

## 2022-03-31 NOTE — PROGRESS NOTES
Chief Complaint   Patient presents with    Medication Evaluation     pt requesting increase on anxiety med     1. \"Have you been to the ER, urgent care clinic since your last visit? Hospitalized since your last visit? \" Yes, for knee surgery    2. \"Have you seen or consulted any other health care providers outside of the [address] [place] [address] since your last visit? \" Yes, ortho     3. For patients aged 39-70: Has the patient had a colonoscopy / FIT/ Cologuard? No      If the patient is female:    4. For patients aged 41-77: Has the patient had a mammogram within the past 2 years? Yes - no Care Gap present      5. For patients aged 21-65: Has the patient had a pap smear?  Yes - no Care Gap present

## 2022-03-31 NOTE — PROGRESS NOTES
[facility] [address], [facility] [address]  [phone] [facility][phone] fax    3/31/2022    HPI:   Rudi Valdez 1955 is a pleasant WHITE/NON- female who presents virtual today for a sick visit. Past Medical History:   Diagnosis Date    Anxiety 4/5/2021    Diabetes ([facility] [address]     Essential hypertension 4/5/2021    Hyperlipidemia 4/5/2021    OAB (overactive bladder) 4/5/2021    Parkinson's disease ([facility] [address] 4/5/2021    Primary osteoarthritis of both knees 4/5/2021    Radiculopathy of arm 4/5/2021    RLS (restless legs syndrome) 4/5/2021    Sleep apnea 4/5/2021     No past surgical history on file. Current Outpatient Medications   Medication Sig    citalopram (CELEXA) 40 mg tablet Take 1 Tablet by mouth daily. Indications: repeated episodes of anxiety    metFORMIN (GLUCOPHAGE) 500 mg tablet Take 1 Tablet by mouth daily (with breakfast).  atorvastatin (LIPITOR) 20 mg tablet TAKE 1 TABLET EVERY OTHER DAY FOR HIGH CHOLESTEROL AND HIGH TRIGLYCERIDES    pantoprazole (PROTONIX) 20 mg tablet Take 1 Tablet by mouth two (2) times daily as needed for Nausea or PRN Reason (Other) (GERD).  pramipexole (Mirapex) 1.5 mg tablet Take 1 Tablet by mouth three (3) times daily. Indications: restless legs syndrome, an extreme discomfort in the calf muscles when sitting or lying down    aspirin delayed-release 81 mg tablet Take  by mouth daily.  ibuprofen (MOTRIN) 800 mg tablet as needed. (Patient not taking: Reported on 3/31/2022)     No current facility-administered medications for this visit. Allergies and Intolerances:    Allergies   Allergen Reactions    Latex Rash and Itching    Sulfa (Sulfonamide Antibiotics) Anaphylaxis     Family History:   Family History   Problem Relation Age of Onset    Thyroid Disease Mother     Hypertension Mother     Diabetes Brother     Diabetes Brother     Heart Attack Brother      Social History:   She  reports that she has never smoked. She has never used smokeless tobacco.   Social History     Substance and Sexual Activity   Alcohol Use Not Currently     Immunization History:  Immunization History   Administered Date(s) Administered    COVID-19, Moderna Booster, PF, 0.25mL Dose 12/30/2021    COVID-19, Peg Class, Primary or Immunocompromised Series, MRNA, PF, 100mcg/0.5mL 02/15/2021, 06/14/2021    Influenza, Quadrivalent, Adjuvanted (>65 Yrs FLUAD QUAD [id number]) 10/22/2021    Pneumococcal Polysaccharide (PPSV-23) 04/02/2021       Todays concern:  Patient has noted in the past week anxiety and agitation is increasing. She denies stress in her home. When she speaks with her family she becomes easily agitated because she wants him to \"get to the point\". She denies depression. She is eating well. Overall she is happy but has noticed her short temper and increased anxiety. She is tolerating Celexa well and believes it is effective for her but she is seeking to increase the Celexa. She denies side effects of medication. She is sleeping well. Her appetite is intact. Review of Systems:   As above included in HPI. Otherwise 11 point review of systems negative including constitutional, skin, HENT, eyes, respiratory, cardiovascular, gastrointestinal, genitourinary, musculoskeletal, endocrine, hematologic, allergy, and neurologic. Physical:   Exam:   Vital Signs: (As obtained by patient/caregiver at home)  There were not vitals taken for this visit.      PHYSICAL EXAMINATION:  [ INSTRUCTIONS:  \"[x]\" Indicates a positive item  \"[]\" Indicates a negative item  -- DELETE ALL ITEMS NOT EXAMINED]      Constitutional: [x] Appears well-developed and well-nourished [x] No apparent distress      [] Abnormal -     Mental status: [x] Alert and awake  [x] Oriented to person/place/time [x] Able to follow commands    [] Abnormal -     Eyes:   EOM    [x]  Normal    [] Abnormal -   Sclera  [x]  Normal    [] Abnormal - Discharge [x]  None visible   [] Abnormal -     HENT, Neck: [x] Normocephalic, atraumatic  [] Abnormal - . [x] Mouth/Throat: Mucous membranes are moist    Pulmonary/Chest: [x] Respiratory effort normal   [x] No visualized signs of difficulty breathing or respiratory distress        [] Abnormal -      Musculoskeletal:   [] Normal gait with no signs of ataxia         [] Normal range of motion of neck        [] Abnormal -unable to assess    Neurological:        [x] No Facial Asymmetry (Cranial nerve 7 motor function) (limited exam due to video visit)          [x] No gaze palsy        [] Abnormal -        Skin:        [] No significant exanthematous lesions or discoloration noted on facial skin         [] Abnormal - Did not assess           Psychiatric:       [x] Normal Affect; smiling throughout the visit [] Abnormal -        [x] No Hallucinations      Plan:      ICD-10-CM ICD-9-CM    1. Anxiety  F41.9 300.00 citalopram (CELEXA) 40 mg tablet   2. Agitation  R45.1 307.9 citalopram (CELEXA) 40 mg tablet       Due to patient's increased anxiety and agitation along with the request from her to increase her Celexa, instructed patient to take Celexa 20 mg 2 tabs till all gone and then start Celexa 40 mg daily. Instructed patient to follow-up in the office or virtual if medication increase is not effective. She verbalized understanding. Dr. Ann-Marie Costa, LORELEIP-C, DNP  [facility] [place] [address], who was evaluated through a synchronous (real-time) audio-video encounter, and/or her healthcare decision maker, is aware that it is a billable service, which includes applicable co-pays, with coverage as determined by her insurance carrier. She provided verbal consent to proceed: Yes, and patient identification was verified.  This visit was conducted pursuant to the emergency declaration under the [facility] waiver authority and the [facility] and [facility] Supplemental Appropriations Act. A caregiver was present when appropriate. Ability to conduct physical exam was limited. The patient was located in a state where the provider was licensed to provide care.      --Julieta Woodall DNP on 3/31/2022 at 2:19 PM

## 2022-05-12 RX ORDER — CARBIDOPA AND LEVODOPA 25; 100 MG/1; MG/1
TABLET, ORALLY DISINTEGRATING ORAL
Qty: 45 TABLET | Refills: 0 | Status: CANCELLED | OUTPATIENT
Start: 2022-05-12

## 2022-05-12 RX ORDER — CARBIDOPA AND LEVODOPA 25; 100 MG/1; MG/1
1.5 TABLET ORAL 3 TIMES DAILY
Qty: 135 TABLET | Refills: 1 | Status: SHIPPED | OUTPATIENT
Start: 2022-05-12 | End: 2022-07-06

## 2022-05-12 RX ORDER — CARBIDOPA AND LEVODOPA 25; 100 MG/1; MG/1
TABLET, ORALLY DISINTEGRATING ORAL
COMMUNITY
Start: 2022-01-01 | End: 2022-05-12

## 2022-06-27 DIAGNOSIS — E11.65 TYPE 2 DIABETES MELLITUS WITH HYPERGLYCEMIA, WITHOUT LONG-TERM CURRENT USE OF INSULIN (HCC): ICD-10-CM

## 2022-06-29 DIAGNOSIS — K21.9 GASTROESOPHAGEAL REFLUX DISEASE WITHOUT ESOPHAGITIS: ICD-10-CM

## 2022-06-30 RX ORDER — METFORMIN HYDROCHLORIDE 500 MG/1
TABLET ORAL
Qty: 90 TABLET | Refills: 0 | Status: SHIPPED | OUTPATIENT
Start: 2022-06-30 | End: 2022-07-18 | Stop reason: SDUPTHER

## 2022-06-30 RX ORDER — PANTOPRAZOLE SODIUM 20 MG/1
20 TABLET, DELAYED RELEASE ORAL
Qty: 180 TABLET | Refills: 0 | Status: SHIPPED | OUTPATIENT
Start: 2022-06-30 | End: 2022-07-18 | Stop reason: SDUPTHER

## 2022-06-30 NOTE — TELEPHONE ENCOUNTER
Requested Prescriptions     Signed Prescriptions Disp Refills    metFORMIN (GLUCOPHAGE) 500 mg tablet 90 Tablet 0     Sig: TAKE 1 TABLET EVERY DAY WITH BREAKFAST     Authorizing Provider: Belia Spencer

## 2022-07-11 ENCOUNTER — LAB ONLY (OUTPATIENT)
Dept: INTERNAL MEDICINE CLINIC | Age: 67
End: 2022-07-11

## 2022-07-11 DIAGNOSIS — I10 ESSENTIAL HYPERTENSION: ICD-10-CM

## 2022-07-11 DIAGNOSIS — E11.65 TYPE 2 DIABETES MELLITUS WITH HYPERGLYCEMIA, WITHOUT LONG-TERM CURRENT USE OF INSULIN (HCC): Primary | ICD-10-CM

## 2022-07-11 DIAGNOSIS — E78.5 HYPERLIPIDEMIA, UNSPECIFIED HYPERLIPIDEMIA TYPE: ICD-10-CM

## 2022-07-13 LAB
ALBUMIN SERPL-MCNC: 4.5 G/DL (ref 3.8–4.8)
ALBUMIN/GLOB SERPL: 1.7 {RATIO} (ref 1.2–2.2)
ALP SERPL-CCNC: 146 IU/L (ref 44–121)
ALT SERPL-CCNC: 19 IU/L (ref 0–32)
AST SERPL-CCNC: 20 IU/L (ref 0–40)
BILIRUB SERPL-MCNC: <0.2 MG/DL (ref 0–1.2)
BUN SERPL-MCNC: 11 MG/DL (ref 8–27)
BUN/CREAT SERPL: 15 (ref 12–28)
CALCIUM SERPL-MCNC: 9.6 MG/DL (ref 8.7–10.3)
CHLORIDE SERPL-SCNC: 99 MMOL/L (ref 96–106)
CHOLEST SERPL-MCNC: 123 MG/DL (ref 100–199)
CO2 SERPL-SCNC: 22 MMOL/L (ref 20–29)
CREAT SERPL-MCNC: 0.72 MG/DL (ref 0.57–1)
EGFR: 91 ML/MIN/1.73
EST. AVERAGE GLUCOSE BLD GHB EST-MCNC: 120 MG/DL
GLOBULIN SER CALC-MCNC: 2.6 G/DL (ref 1.5–4.5)
GLUCOSE SERPL-MCNC: 85 MG/DL (ref 65–99)
HBA1C MFR BLD: 5.8 % (ref 4.8–5.6)
HDLC SERPL-MCNC: 40 MG/DL
IMP & REVIEW OF LAB RESULTS: NORMAL
LDLC SERPL CALC-MCNC: 62 MG/DL (ref 0–99)
POTASSIUM SERPL-SCNC: 5.1 MMOL/L (ref 3.5–5.2)
PROT SERPL-MCNC: 7.1 G/DL (ref 6–8.5)
SODIUM SERPL-SCNC: 140 MMOL/L (ref 134–144)
TRIGL SERPL-MCNC: 113 MG/DL (ref 0–149)
VLDLC SERPL CALC-MCNC: 21 MG/DL (ref 5–40)

## 2022-07-18 ENCOUNTER — OFFICE VISIT (OUTPATIENT)
Dept: INTERNAL MEDICINE CLINIC | Age: 67
End: 2022-07-18
Payer: MEDICARE

## 2022-07-18 VITALS
WEIGHT: 187 LBS | OXYGEN SATURATION: 99 % | BODY MASS INDEX: 34.41 KG/M2 | SYSTOLIC BLOOD PRESSURE: 133 MMHG | TEMPERATURE: 96.6 F | DIASTOLIC BLOOD PRESSURE: 57 MMHG | HEIGHT: 62 IN | RESPIRATION RATE: 18 BRPM | HEART RATE: 71 BPM

## 2022-07-18 DIAGNOSIS — E78.5 HYPERLIPIDEMIA, UNSPECIFIED HYPERLIPIDEMIA TYPE: ICD-10-CM

## 2022-07-18 DIAGNOSIS — F41.9 ANXIETY: ICD-10-CM

## 2022-07-18 DIAGNOSIS — Z12.11 SCREEN FOR COLON CANCER: ICD-10-CM

## 2022-07-18 DIAGNOSIS — G20 PARKINSON DISEASE (HCC): ICD-10-CM

## 2022-07-18 DIAGNOSIS — M72.2 PLANTAR FASCIITIS: ICD-10-CM

## 2022-07-18 DIAGNOSIS — G47.30 SLEEP APNEA, UNSPECIFIED TYPE: ICD-10-CM

## 2022-07-18 DIAGNOSIS — G25.81 RLS (RESTLESS LEGS SYNDROME): ICD-10-CM

## 2022-07-18 DIAGNOSIS — R60.0 EDEMA OF BOTH LOWER EXTREMITIES: ICD-10-CM

## 2022-07-18 DIAGNOSIS — Z71.89 ADVANCED DIRECTIVES, COUNSELING/DISCUSSION: ICD-10-CM

## 2022-07-18 DIAGNOSIS — R45.1 AGITATION: ICD-10-CM

## 2022-07-18 DIAGNOSIS — K21.9 GASTROESOPHAGEAL REFLUX DISEASE WITHOUT ESOPHAGITIS: ICD-10-CM

## 2022-07-18 DIAGNOSIS — Z00.00 MEDICARE ANNUAL WELLNESS VISIT, SUBSEQUENT: Primary | ICD-10-CM

## 2022-07-18 DIAGNOSIS — E11.65 TYPE 2 DIABETES MELLITUS WITH HYPERGLYCEMIA, WITHOUT LONG-TERM CURRENT USE OF INSULIN (HCC): ICD-10-CM

## 2022-07-18 PROCEDURE — 99214 OFFICE O/P EST MOD 30 MIN: CPT | Performed by: NURSE PRACTITIONER

## 2022-07-18 PROCEDURE — G8427 DOCREV CUR MEDS BY ELIG CLIN: HCPCS | Performed by: NURSE PRACTITIONER

## 2022-07-18 PROCEDURE — G0447 BEHAVIOR COUNSEL OBESITY 15M: HCPCS | Performed by: NURSE PRACTITIONER

## 2022-07-18 PROCEDURE — 3044F HG A1C LEVEL LT 7.0%: CPT | Performed by: NURSE PRACTITIONER

## 2022-07-18 PROCEDURE — G0439 PPPS, SUBSEQ VISIT: HCPCS | Performed by: NURSE PRACTITIONER

## 2022-07-18 PROCEDURE — G8752 SYS BP LESS 140: HCPCS | Performed by: NURSE PRACTITIONER

## 2022-07-18 PROCEDURE — 3017F COLORECTAL CA SCREEN DOC REV: CPT | Performed by: NURSE PRACTITIONER

## 2022-07-18 PROCEDURE — 1101F PT FALLS ASSESS-DOCD LE1/YR: CPT | Performed by: NURSE PRACTITIONER

## 2022-07-18 PROCEDURE — G8432 DEP SCR NOT DOC, RNG: HCPCS | Performed by: NURSE PRACTITIONER

## 2022-07-18 PROCEDURE — 1123F ACP DISCUSS/DSCN MKR DOCD: CPT | Performed by: NURSE PRACTITIONER

## 2022-07-18 PROCEDURE — 99497 ADVNCD CARE PLAN 30 MIN: CPT | Performed by: NURSE PRACTITIONER

## 2022-07-18 PROCEDURE — G8536 NO DOC ELDER MAL SCRN: HCPCS | Performed by: NURSE PRACTITIONER

## 2022-07-18 PROCEDURE — G8754 DIAS BP LESS 90: HCPCS | Performed by: NURSE PRACTITIONER

## 2022-07-18 PROCEDURE — G8399 PT W/DXA RESULTS DOCUMENT: HCPCS | Performed by: NURSE PRACTITIONER

## 2022-07-18 PROCEDURE — G9899 SCRN MAM PERF RSLTS DOC: HCPCS | Performed by: NURSE PRACTITIONER

## 2022-07-18 PROCEDURE — G8417 CALC BMI ABV UP PARAM F/U: HCPCS | Performed by: NURSE PRACTITIONER

## 2022-07-18 PROCEDURE — 2022F DILAT RTA XM EVC RTNOPTHY: CPT | Performed by: NURSE PRACTITIONER

## 2022-07-18 PROCEDURE — 1090F PRES/ABSN URINE INCON ASSESS: CPT | Performed by: NURSE PRACTITIONER

## 2022-07-18 RX ORDER — ATORVASTATIN CALCIUM 20 MG/1
TABLET, FILM COATED ORAL
Qty: 45 TABLET | Refills: 1 | Status: SHIPPED | OUTPATIENT
Start: 2022-07-18

## 2022-07-18 RX ORDER — CITALOPRAM 40 MG/1
40 TABLET, FILM COATED ORAL DAILY
Qty: 90 TABLET | Refills: 1 | Status: SHIPPED | OUTPATIENT
Start: 2022-07-18

## 2022-07-18 RX ORDER — PANTOPRAZOLE SODIUM 20 MG/1
20 TABLET, DELAYED RELEASE ORAL
Qty: 180 TABLET | Refills: 1 | Status: SHIPPED | OUTPATIENT
Start: 2022-07-18

## 2022-07-18 RX ORDER — METFORMIN HYDROCHLORIDE 500 MG/1
500 TABLET ORAL
Qty: 90 TABLET | Refills: 1 | Status: SHIPPED | OUTPATIENT
Start: 2022-07-18

## 2022-07-18 RX ORDER — PRAMIPEXOLE DIHYDROCHLORIDE 1.5 MG/1
1.5 TABLET ORAL
Qty: 1 TABLET | Refills: 0
Start: 2022-07-18 | End: 2022-09-01 | Stop reason: SDUPTHER

## 2022-07-18 NOTE — PROGRESS NOTES
This is the Subsequent Medicare Annual Wellness Exam, performed 12 months or more after the Initial AWV or the last Subsequent AWV    I have reviewed the patient's medical history in detail and updated the computerized patient record. Assessment/Plan   Education and counseling provided:  Are appropriate based on today's review and evaluation  Pneumococcal Vaccine  Screening Mammography  Colorectal cancer screening tests  Cardiovascular screening blood test  Screening for glaucoma  Overall patient is doing well    1. Medicare annual wellness visit, subsequent  -     [place] BEHAVIOR  OBESITY 15M  2. Advanced directives, counseling/discussion  -     ADVANCE CARE PLANNING FIRST 30 MINS  -     FULL CODE  3. Body mass index 34.0-34.9, adult  -     [place] BEHAVIOR  OBESITY 15M  4. Screen for colon cancer  -     REFERRAL FOR COLONOSCOPY  5. Type 2 diabetes mellitus with hyperglycemia, without long-term current use of insulin (HCC)  -     metFORMIN (GLUCOPHAGE) 500 mg tablet; Take 1 Tablet by mouth daily (with breakfast). , Normal, Disp-90 Tablet, R-1  6. Gastroesophageal reflux disease without esophagitis  -     pantoprazole (PROTONIX) 20 mg tablet; Take 1 Tablet by mouth two (2) times daily as needed for PRN Reason (Other) (GERD). , Normal, Disp-180 Tablet, R-1  7. Anxiety  -     citalopram (CELEXA) 40 mg tablet; Take 1 Tablet by mouth daily. Indications: repeated episodes of anxiety, Normal, Disp-90 Tablet, R-1  8. Agitation  -     citalopram (CELEXA) 40 mg tablet; Take 1 Tablet by mouth daily. Indications: repeated episodes of anxiety, Normal, Disp-90 Tablet, R-1  9. Hyperlipidemia, unspecified hyperlipidemia type  -     atorvastatin (LIPITOR) 20 mg tablet; TAKE 1 TABLET EVERY OTHER DAY FOR HIGH CHOLESTEROL AND HIGH TRIGLYCERIDES, Normal, Disp-45 Tablet, R-1  10. Parkinson disease ([facility] [address]  11. RLS (restless legs syndrome)  -     pramipexole (Mirapex) 1.5 mg tablet; Take 1 Tablet by mouth nightly.  Indications: restless legs syndrome, an extreme discomfort in the calf muscles when sitting or lying down, No Print, Disp-1 Tablet, R-0Neurology prescribes. 12. Sleep apnea, unspecified type  13. Plantar fasciitis  -     REFERRAL TO PODIATRY  14. Edema of both lower extremities       Depression Risk Factor Screening     3 most recent PHQ Screens 7/18/2022   Little interest or pleasure in doing things Not at all   Feeling down, depressed, irritable, or hopeless Not at all   Total Score PHQ 2 0   Trouble falling or staying asleep, or sleeping too much Not at all   Feeling tired or having little energy Not at all   Poor appetite, weight loss, or overeating Not at all   Feeling bad about yourself - or that you are a failure or have let yourself or your family down Not at all   Trouble concentrating on things such as school, work, reading, or watching TV Not at all   Moving or speaking so slowly that other people could have noticed; or the opposite being so fidgety that others notice Not at all   Thoughts of being better off dead, or hurting yourself in some way Not at all   PHQ 9 Score 0   How difficult have these problems made it for you to do your work, take care of your home and get along with others Not difficult at all       Alcohol & Drug Abuse Risk Screen    Do you average more than 1 drink per night or more than 7 drinks a week:  No    On any one occasion in the past three months have you have had more than 3 drinks containing alcohol:  No          Functional Ability and Level of Safety    Hearing: Hearing is good. Activities of Daily Living: The home contains: no safety equipment. Patient does total self care      Ambulation: with no difficulty     Fall Risk:  Fall Risk Assessment, last 12 mths 7/18/2022   Able to walk? Yes   Fall in past 12 months? 0   Do you feel unsteady? -   Are you worried about falling 0   Is TUG test greater than 12 seconds?  -   Is the gait abnormal? -   Number of falls in past 12 months - Abuse Screen:  Patient is not abused       Cognitive Screening    Has your family/caregiver stated any concerns about your memory: no     Cognitive Screening: Normal - Clock Drawing Test    Health Maintenance Due     Health Maintenance Due   Topic Date Due    Colorectal Cancer Screening Combo  Never done       Patient Care Team   Patient Care Team:  Cristina Blanco DNP as PCP - General (Nurse Practitioner)  Cristina Blanco DNP as PCP - [facility] Empaneled Provider  Edith Melvin NP (Neurology)    History     Patient Active Problem List   Diagnosis Code    Hyperlipidemia E78.5    Essential hypertension I10    Parkinson's disease ([address] G20    Anxiety F41.9    Sleep apnea G47.30    OAB (overactive bladder) N32.81    Primary osteoarthritis of both knees M17.0    RLS (restless legs syndrome) G25.81    Radiculopathy of arm M54.10    Type 2 diabetes mellitus with hyperglycemia, without long-term current use of insulin ([address] E11.65    Pacemaker Z95.0    CKD (chronic kidney disease) N18.9    BPH (benign prostatic hyperplasia) N40.0    Primary osteoarthritis of left knee M17.12     Past Medical History:   Diagnosis Date    Anxiety 4/5/2021    Diabetes ([address]     Essential hypertension 4/5/2021    Hyperlipidemia 4/5/2021    OAB (overactive bladder) 4/5/2021    Parkinson's disease ([address] 4/5/2021    Primary osteoarthritis of both knees 4/5/2021    Radiculopathy of arm 4/5/2021    RLS (restless legs syndrome) 4/5/2021    Sleep apnea 4/5/2021      No past surgical history on file. Current Outpatient Medications   Medication Sig Dispense Refill    metFORMIN (GLUCOPHAGE) 500 mg tablet Take 1 Tablet by mouth daily (with breakfast). 90 Tablet 1    pantoprazole (PROTONIX) 20 mg tablet Take 1 Tablet by mouth two (2) times daily as needed for PRN Reason (Other) (GERD). 180 Tablet 1    citalopram (CELEXA) 40 mg tablet Take 1 Tablet by mouth daily.  Indications: repeated episodes of anxiety 90 Tablet 1  atorvastatin (LIPITOR) 20 mg tablet TAKE 1 TABLET EVERY OTHER DAY FOR HIGH CHOLESTEROL AND HIGH TRIGLYCERIDES 45 Tablet 1    pramipexole (Mirapex) 1.5 mg tablet Take 1 Tablet by mouth nightly. Indications: restless legs syndrome, an extreme discomfort in the calf muscles when sitting or lying down 1 Tablet 0    carbidopa-levodopa (SINEMET)  mg per tablet TAKE 1 AND 1/2 TABLETS THREE TIMES DAILY 135 Tablet 0    aspirin delayed-release 81 mg tablet Take  by mouth daily.        Allergies   Allergen Reactions    Latex Rash and Itching    Sulfa (Sulfonamide Antibiotics) Anaphylaxis       Family History   Problem Relation Age of Onset    Thyroid Disease Mother     Hypertension Mother     Diabetes Brother     Diabetes Brother     Heart Attack Brother      Social History     Tobacco Use    Smoking status: Never Smoker    Smokeless tobacco: Never Used   Substance Use Topics    Alcohol use: Not Currently         Beryle Area, DNP

## 2022-07-18 NOTE — PROGRESS NOTES
Chief Complaint   Patient presents with   Emelyn See Annual Wellness Visit     1. \"Have you been to the ER, urgent care clinic since your last visit? Hospitalized since your last visit? \" No.    2. \"Have you seen or consulted any other health care providers outside of the [address] [place] [address] since your last visit? \" Yes, Ortho     3. For patients aged 39-70: Has the patient had a colonoscopy / FIT/ Cologuard? No      If the patient is female:    4. For patients aged 41-77: Has the patient had a mammogram within the past 2 years? Yes - no Care Gap present      5. For patients aged 21-65: Has the patient had a pap smear?  Yes - no Care Gap present

## 2022-07-18 NOTE — ACP (ADVANCE CARE PLANNING)
Advance Care Planning     Advance Care Planning (ACP) Physician/NP/PA Conversation      Date of Conversation: 7/18/2022  Conducted with: Patient with Decision Making Capacity    Healthcare Decision Maker:     Primary Decision Maker: Jerri Marcum - Jud - [phone]  Click here to complete [address] including selection of the Healthcare Decision Maker Relationship (ie \"Primary\")        Today we documented Decision Maker(s). The patient will provide ACP documents. Care Preferences:    Hospitalization: \"If your health worsens and it becomes clear that your chance of recovery is unlikely, what would be your preference regarding hospitalization? \"  The patient is unsure. Ventilation: \"If you were unable to breathe on your own and your chance of recovery was unlikely, what would be your preference about the use of a ventilator (breathing machine) if it was available to you? \"   The patient would NOT desire the use of a ventilator. Resuscitation: \"In the event your heart stopped as a result of an underlying serious health condition, would you want attempts to be made to restart your heart, or would you prefer a natural death? \"   Yes, attempt to resuscitate.     Additional topics discussed: ventilation preferences, hospitalization preferences and resuscitation preferences    Conversation Outcomes / Follow-Up Plan:   ACP in process - completing/providing documents   Reviewed DNR/DNI and patient elects Full Code (Attempt Resuscitation)     Length of Voluntary ACP Conversation in minutes:  16 minutes    Esteban Ellis DNP

## 2022-07-18 NOTE — PATIENT INSTRUCTIONS
Medicare Wellness Visit, Female     The best way to live healthy is to have a lifestyle where you eat a well-balanced diet, exercise regularly, limit alcohol use, and quit all forms of tobacco/nicotine, if applicable. Regular preventive services are another way to keep healthy. Preventive services (vaccines, screening tests, monitoring & exams) can help personalize your care plan, which helps you manage your own care. Screening tests can find health problems at the earliest stages, when they are easiest to treat. [facility] follows the current, evidence-based guidelines published by the [facility] (USPSTF) when recommending preventive services for our patients. Because we follow these guidelines, sometimes recommendations change over time as research supports it. (For example, mammograms used to be recommended annually. Even though Medicare will still pay for an annual mammogram, the newer guidelines recommend a mammogram every two years for women of average risk). Of course, you and your doctor may decide to screen more often for some diseases, based on your risk and your co-morbidities (chronic disease you are already diagnosed with). Preventive services for you include:  - Medicare offers their members a free annual wellness visit, which is time for you and your primary care provider to discuss and plan for your preventive service needs. Take advantage of this benefit every year!  -All adults over the age of 72 should receive the recommended pneumonia vaccines. Current USPSTF guidelines recommend a series of two vaccines for the best pneumonia protection.   -All adults should have a flu vaccine yearly and a tetanus vaccine every 10 years.   -All adults age 48 and older should receive the shingles vaccines (series of two vaccines).       -All adults age 38-68 who are overweight should have a diabetes screening test once every three years.   -All adults born between [birth year] and 1967 should be screened once for Hepatitis C.  -Other screening tests and preventive services for persons with diabetes include: an eye exam to screen for diabetic retinopathy, a kidney function test, a foot exam, and stricter control over your cholesterol.   -Cardiovascular screening for adults with routine risk involves an electrocardiogram (ECG) at intervals determined by your doctor.   -Colorectal cancer screenings should be done for adults age 54-65 with no increased risk factors for colorectal cancer. There are a number of acceptable methods of screening for this type of cancer. Each test has its own benefits and drawbacks. Discuss with your doctor what is most appropriate for you during your annual wellness visit. The different tests include: colonoscopy (considered the best screening method), a fecal occult blood test, a fecal DNA test, and sigmoidoscopy.    -A bone mass density test is recommended when a woman turns 65 to screen for osteoporosis. This test is only recommended one time, as a screening. Some providers will use this same test as a disease monitoring tool if you already have osteoporosis. -Breast cancer screenings are recommended every other year for women of normal risk, age 54-69.  -Cervical cancer screenings for women over age 72 are only recommended with certain risk factors.      Here is a list of your current Health Maintenance items (your personalized list of preventive services) with a due date:  Health Maintenance Due   Topic Date Due    Albumin Urine Test  Never done    Eye Exam  Never done    Colorectal Screening  Never done    Pneumococcal Vaccine (2 - PCV) 04/02/2022

## 2022-07-18 NOTE — PROGRESS NOTES
Internists of [address] [place] [address]  Shana Chaudharymozoe  [phone] [place]/[phone] fax    7/18/2022    Claude Decant 1955 is a pleasant [address] female. She is a native of [address] [place] [address]. She drove herself to her appointment today. Past Medical History:   Diagnosis Date    Anxiety 4/5/2021    Diabetes ([facility] [address]     Essential hypertension 4/5/2021    Hyperlipidemia 4/5/2021    OAB (overactive bladder) 4/5/2021    Parkinson's disease ([facility] [address] 4/5/2021    Primary osteoarthritis of both knees 4/5/2021    Radiculopathy of arm 4/5/2021    RLS (restless legs syndrome) 4/5/2021    Sleep apnea 4/5/2021     No past surgical history on file. Current Outpatient Medications   Medication Sig    metFORMIN (GLUCOPHAGE) 500 mg tablet Take 1 Tablet by mouth daily (with breakfast).  pantoprazole (PROTONIX) 20 mg tablet Take 1 Tablet by mouth two (2) times daily as needed for PRN Reason (Other) (GERD).  citalopram (CELEXA) 40 mg tablet Take 1 Tablet by mouth daily. Indications: repeated episodes of anxiety    atorvastatin (LIPITOR) 20 mg tablet TAKE 1 TABLET EVERY OTHER DAY FOR HIGH CHOLESTEROL AND HIGH TRIGLYCERIDES    pramipexole (Mirapex) 1.5 mg tablet Take 1 Tablet by mouth nightly. Indications: restless legs syndrome, an extreme discomfort in the calf muscles when sitting or lying down    carbidopa-levodopa (SINEMET)  mg per tablet TAKE 1 AND 1/2 TABLETS THREE TIMES DAILY    aspirin delayed-release 81 mg tablet Take  by mouth daily. No current facility-administered medications for this visit. Allergies and Intolerances:    Allergies   Allergen Reactions    Latex Rash and Itching    Sulfa (Sulfonamide Antibiotics) Anaphylaxis     Family History:   Family History   Problem Relation Age of Onset    Thyroid Disease Mother     Hypertension Mother     Diabetes Brother     Diabetes Brother     Heart Attack Brother      Social History:   She reports that she has never smoked. She has never used smokeless tobacco.   Social History     Substance and Sexual Activity   Alcohol Use Not Currently     Immunization History:  Immunization History   Administered Date(s) Administered    COVID-19, MODERNA BLUE border, Primary or Immunocompromised, (age 18y+), IM, 100 mcg/0.5mL 02/15/2021, 06/14/2021    COVID-19, MODERNA Booster BLUE border, (age 18y+), IM, 50mcg/0.25mL 12/30/2021    Influenza, Quadrivalent, Adjuvanted (>65 Yrs FLUAD QUAD [id number]) 10/22/2021    Pneumococcal Polysaccharide (PPSV-23) 04/02/2021       Todays concerns/HPI:  1. Pain of dorsum right foot. Pain occurs during the day but it is worse at night with a score of 10/10. She was diagnosed with plantars fasciitis years ago. She utilizes a ball every morning to stretch her tendon along with a water bottle other modalities to help obtain relief of the fasciitis. Unfortunately the dorsal part of her foot continues to ache. 2.  Bilateral ankle swelling. Left is worse than the right. Noted worsening of edema to the left ankle x2 weeks ago. She has compression stockings at home but she does not utilize. She also has compression booties but is not utilizing at this time. She does admit to sitting for long periods of time. She tries to limit her sodium intake. 11/12/2021 rt total knee replacement under Dr. Patricia Ibanez   3/4/2020 lt total knee replacement  under Dr. Patricia Ibanez   Completely pain-free to both knees. Diabetes: taking Metformin every day. Denies s/e or complications with medication. She does not check her BS.     Parkinson's disease:  diagnosed 2014.       Hypertension: Diagnosed 2019. Due to multiple low blood pressure readings her hypertensive medications were discontinued.      Cholesterol: Diagnosed in 2016. She continues statin therapy daily and tolerating therapy well.       Anxiety: In 2001 she fell and fractured her ankle.   Since this fall she has experienced great anxiety. She was seeing a psychiatrist in [place] who placed her on Prozac 20 mg plus Prozac 40 mg every day which was effective until April 2021. She was placed on Celexa therapy at that time and is doing well.      Sleep apnea: Diagnosed in 2010. She is currently not on a CPAP.     RLS: Diagnosed 2019. Taking gabapentin and mirapex. She is taking Mirapex nightly and not 3 times daily. These therapies have been effective for her symptoms.     GERD: Taking pantoprazole as needed therapy effective. OAB: Diagnosed many years ago. No longer taking oxybutynin.        Review of Systems:  Pertinent items are noted in HPI. Review of Data: Alk phos elevated 146    LDL 62  A1c 5.8    Physical:   Visit Vitals  BP (!) 133/57   Pulse 71   Temp (!) 96.6 °F (35.9 °C) (Temporal)   Resp 18   Ht 5' 2\" (1.575 m)   Wt 187 lb (84.8 kg)   LMP  (LMP Unknown)   SpO2 99%   BMI 34.20 kg/m²      Wt Readings from Last 3 Encounters:   07/18/22 187 lb (84.8 kg)   02/18/22 185 lb (83.9 kg)   10/22/21 198 lb 3.2 oz (89.9 kg)       Exam:   Physical Exam  Constitutional:       Appearance: Normal appearance. She is obese. HENT:      Head: Normocephalic and atraumatic. Right Ear: Tympanic membrane, ear canal and external ear normal.      Left Ear: Tympanic membrane, ear canal and external ear normal.      Mouth/Throat:      Mouth: Mucous membranes are moist.   Eyes:      Extraocular Movements: Extraocular movements intact. Conjunctiva/sclera: Conjunctivae normal.   Neck:      Vascular: No carotid bruit. Cardiovascular:      Rate and Rhythm: Normal rate and regular rhythm. Pulses: Normal pulses. Heart sounds: Normal heart sounds. Pulmonary:      Effort: Pulmonary effort is normal. No respiratory distress. Breath sounds: Normal breath sounds. No wheezing. Abdominal:      General: Abdomen is flat. Bowel sounds are normal. There is no distension. Palpations: Abdomen is soft. Tenderness: There is no abdominal tenderness. Musculoskeletal:         General: Normal range of motion. Cervical back: Normal range of motion and neck supple. Right lower leg: Edema (1+ nonpitting edema) present. Left lower leg: Edema (1-2+nonpitting edema) present. Comments: Gait stable  Muscle strength to all extremities 5/5   Skin:     General: Skin is warm and dry. Comments: Surgical incisions to katy knees healed   Neurological:      General: No focal deficit present. Mental Status: She is alert and oriented to person, place, and time. Psychiatric:         Mood and Affect: Mood normal.         Behavior: Behavior normal.        Body mass index is 34.2 kg/m². Plan:  1. Medicare annual wellness visit, subsequent  [facility]ness: Reviewed all HM and ordered tests/imaging appropriately. Reviewed care teams and medications with updates. Advanced Care Planning: Patient educated on the importance of an advanced care plan and paperwork was given to the patient today. Asked patient to read over the information and bring back at next appointment. - [place] BEHAVIOR  OBESITY 15M    2. Advanced directives, counseling/discussion    - ADVANCE CARE PLANNING FIRST 30 MINS  - FULL CODE    3. Body mass index 34.0-34.9, adult  Weight management:  BMI is out of normal parameters and plan is as follows: Discussed in great detail on diet, portion control, exercise, avoiding foods high in sugar, carbs and starches, fatty/greasy foods and to eat until satisfied not til full. I have counseled this patient on diet and exercise regimens as well. Patient verbalized understanding. Discussion about modifying behaviors regarding diet and exercise undertaken.   Increase activity as tolerated   -Pt is motivated   Cut back on carbs and fatty foods  Calorie counting would be beneficial  Weight loss options: Weight Watchers, exercise jemal, Noom, GOLO, Silver sneakers   Discussed referral to nutritionist for further counseling              -Pt declined  Discussed weight loss target of 5% over 6-12 months being a realistic goal.  Will reevaluate maria dolores loss and goals along with management at subsequent visits. Total time: 15 minutes     - [place] BEHAVIOR  OBESITY 15M    4. Screen for colon cancer    - REFERRAL FOR COLONOSCOPY    5. Type 2 diabetes mellitus with hyperglycemia, without long-term current use of insulin ([facility])  POC A1c 5.8  No change in therapy    - metFORMIN (GLUCOPHAGE) 500 mg tablet; Take 1 Tablet by mouth daily (with breakfast). Dispense: 90 Tablet; Refill: 1    6. Gastroesophageal reflux disease without esophagitis  continue, Pantoprazole 20 mg daily as needed   Avoid gut irritants such as spicy foods  Avoid laying down for 30 to 45 minutes after eating  Avoid excessive alcohol consumption    - pantoprazole (PROTONIX) 20 mg tablet; Take 1 Tablet by mouth two (2) times daily as needed for PRN Reason (Other) (GERD). Dispense: 180 Tablet; Refill: 1    7. Anxiety    - citalopram (CELEXA) 40 mg tablet; Take 1 Tablet by mouth daily. Indications: repeated episodes of anxiety  Dispense: 90 Tablet; Refill: 1    8. Agitation    - citalopram (CELEXA) 40 mg tablet; Take 1 Tablet by mouth daily. Indications: repeated episodes of anxiety  Dispense: 90 Tablet; Refill: 1    9. Hyperlipidemia, unspecified hyperlipidemia type  ; LDL 62  Reduce fried fatty or oily foods in diet  Limit red meats, processed foods, and alcohol. Limit fast food  Work on weight reduction  Increase water intake    - atorvastatin (LIPITOR) 20 mg tablet; TAKE 1 TABLET EVERY OTHER DAY FOR HIGH CHOLESTEROL AND HIGH TRIGLYCERIDES  Dispense: 45 Tablet; Refill: 1    10. Parkinson disease [facility])  Continue current medication regimen as prescribed by neurology  Specialist managed condition is being evaluated/managed by Simón Toro. No acute findings today meriting change in management plan.       11. RLS (restless legs syndrome)    - pramipexole (Mirapex) 1.5 mg tablet; Take 1 Tablet by mouth nightly. Indications: restless legs syndrome, an extreme discomfort in the calf muscles when sitting or lying down  Dispense: 1 Tablet; Refill: 0    12. Sleep apnea, unspecified type  Not on CPAP tx    13. Plantar fasciitis  Treatments include home therapy (Every morning take a tennis ball, place on the floor and run your foot across the ball to allow it to roll/massage the sole of the foot from the toes to the heel), shoe inserts, steroid injections, and surgery if the case is severe. - REFERRAL TO PODIATRY    14. Edema of both lower extremities  Instructed Patient on relieving and preventing leg edema. The first line of defense is: extremity elevation. Elevate legs above the level of the heart to allow gravity to help pull fluid back to the body. Other ways to decrease swelling is limiting salt intake, drink plenty of water, and avoid sitting with the feet dependent for long periods of time. Sitting for extended periods of time can also lead to edema. Strongly encouraged pt to wear knee high compression hose everyday. Remove at night and reapply in am.     Reviewed medication and completed medication reconciliation with the patient. Reviewed side effects of medications with the patient. Questions were answered and patient verb understanding.       Follow up 6 months POC A1c      Dr. Meño Arenas, LORELEIP-C, DNP  Internists of [address] [place] [address]

## 2022-07-29 ENCOUNTER — TELEPHONE (OUTPATIENT)
Dept: INTERNAL MEDICINE CLINIC | Age: 67
End: 2022-07-29

## 2022-07-29 NOTE — TELEPHONE ENCOUNTER
Pt's daughter calling on her behalf. Stated had OV with podiatry Dr Shila Thompson on 07/25/22. Said pt was given an injection for pain that did not help. She is asking if you will prescribe something for pain? She did not call the podiatrist office to request this. Stated was not sure who to ask, pcp or the podiatrist   Stated per the podiatrist pt would benefit from diabetic shoes.  Asking for order and fax to the office   Fax # from referral is [phone]

## 2022-08-01 NOTE — TELEPHONE ENCOUNTER
I spoke with the patient's daughter and advised per Dr. Cierra Pink, the [facility] shoe [facility]. Patient 's daughter verbalized understanding.

## 2022-08-01 NOTE — TELEPHONE ENCOUNTER
Called patient and left VM to call the office back.   Patient will need to contact podiatry to initiate the order for shoes, per Dr. Celena Johnson

## 2022-08-25 NOTE — TELEPHONE ENCOUNTER
Delmis Watson office calling asking about med clearance. [phone] Surgery is scheduled for 3/4/2022. Please call her with update. Patient made aware of 24/7 emergency services.

## 2022-08-30 RX ORDER — CARBIDOPA AND LEVODOPA 25; 100 MG/1; MG/1
TABLET ORAL
Qty: 270 TABLET | Refills: 0 | Status: SHIPPED | OUTPATIENT
Start: 2022-08-30

## 2022-09-01 DIAGNOSIS — G25.81 RLS (RESTLESS LEGS SYNDROME): ICD-10-CM

## 2022-09-01 RX ORDER — PRAMIPEXOLE DIHYDROCHLORIDE 1.5 MG/1
1.5 TABLET ORAL
Qty: 1 TABLET | Refills: 0 | Status: SHIPPED | OUTPATIENT
Start: 2022-09-01 | End: 2022-09-12 | Stop reason: SDUPTHER

## 2022-09-12 DIAGNOSIS — G25.81 RLS (RESTLESS LEGS SYNDROME): ICD-10-CM

## 2022-09-12 RX ORDER — PRAMIPEXOLE DIHYDROCHLORIDE 1.5 MG/1
1.5 TABLET ORAL
Qty: 90 TABLET | Refills: 0 | Status: SHIPPED | OUTPATIENT
Start: 2022-09-12 | End: 2022-12-11

## 2022-09-15 ENCOUNTER — TELEPHONE (OUTPATIENT)
Dept: INTERNAL MEDICINE CLINIC | Age: 67
End: 2022-09-15

## 2022-09-15 NOTE — TELEPHONE ENCOUNTER
Patient reached and offered 11am tomorrow but declind due to having another appt. Would you want to see patient virtually?

## 2022-09-20 ENCOUNTER — OFFICE VISIT (OUTPATIENT)
Dept: INTERNAL MEDICINE CLINIC | Age: 67
End: 2022-09-20
Payer: MEDICARE

## 2022-09-20 VITALS
BODY MASS INDEX: 35.81 KG/M2 | DIASTOLIC BLOOD PRESSURE: 57 MMHG | SYSTOLIC BLOOD PRESSURE: 121 MMHG | OXYGEN SATURATION: 97 % | WEIGHT: 194.6 LBS | HEART RATE: 73 BPM | RESPIRATION RATE: 18 BRPM | TEMPERATURE: 97.4 F | HEIGHT: 62 IN

## 2022-09-20 DIAGNOSIS — R14.3 FLATULENCE: ICD-10-CM

## 2022-09-20 DIAGNOSIS — R14.0 BLOATING SYMPTOM: Primary | ICD-10-CM

## 2022-09-20 PROCEDURE — G8399 PT W/DXA RESULTS DOCUMENT: HCPCS | Performed by: NURSE PRACTITIONER

## 2022-09-20 PROCEDURE — G8417 CALC BMI ABV UP PARAM F/U: HCPCS | Performed by: NURSE PRACTITIONER

## 2022-09-20 PROCEDURE — 1090F PRES/ABSN URINE INCON ASSESS: CPT | Performed by: NURSE PRACTITIONER

## 2022-09-20 PROCEDURE — G8536 NO DOC ELDER MAL SCRN: HCPCS | Performed by: NURSE PRACTITIONER

## 2022-09-20 PROCEDURE — G8752 SYS BP LESS 140: HCPCS | Performed by: NURSE PRACTITIONER

## 2022-09-20 PROCEDURE — G9899 SCRN MAM PERF RSLTS DOC: HCPCS | Performed by: NURSE PRACTITIONER

## 2022-09-20 PROCEDURE — G8427 DOCREV CUR MEDS BY ELIG CLIN: HCPCS | Performed by: NURSE PRACTITIONER

## 2022-09-20 PROCEDURE — 3017F COLORECTAL CA SCREEN DOC REV: CPT | Performed by: NURSE PRACTITIONER

## 2022-09-20 PROCEDURE — 99213 OFFICE O/P EST LOW 20 MIN: CPT | Performed by: NURSE PRACTITIONER

## 2022-09-20 PROCEDURE — 1101F PT FALLS ASSESS-DOCD LE1/YR: CPT | Performed by: NURSE PRACTITIONER

## 2022-09-20 PROCEDURE — G8754 DIAS BP LESS 90: HCPCS | Performed by: NURSE PRACTITIONER

## 2022-09-20 PROCEDURE — G8432 DEP SCR NOT DOC, RNG: HCPCS | Performed by: NURSE PRACTITIONER

## 2022-09-20 PROCEDURE — 1123F ACP DISCUSS/DSCN MKR DOCD: CPT | Performed by: NURSE PRACTITIONER

## 2022-09-20 NOTE — PROGRESS NOTES
Chief Complaint   Patient presents with    Abdominal Pain     Pt c/o right side abdominal pain x 2 weeks    Headache     Intermittent headache x 2 weeks     1. \"Have you been to the ER, urgent care clinic since your last visit? Hospitalized since your last visit? \" No    2. \"Have you seen or consulted any other health care providers outside of the [address] [place] [address] since your last visit? \" No     3. For patients aged 39-70: Has the patient had a colonoscopy / FIT/ Cologuard? No      If the patient is female:    4. For patients aged 41-77: Has the patient had a mammogram within the past 2 years? Yes - no Care Gap present      5. For patients aged 21-65: Has the patient had a pap smear?  NA - based on age or sex

## 2022-09-20 NOTE — PROGRESS NOTES
Internists of [address] [facility], [address] [facility] [address]  [phone] ZKC/[phone] fax        9/20/2022    Patient Dima Patton 1955     Primary MD Ajith Stewart, [address] [place] [address] a 74 y.o. female who presents with a sick visit for   Chief Complaint   Patient presents with    Abdominal Pain     Pt c/o right side abdominal pain x 2 weeks    Headache     Intermittent headache x 2 weeks       HPI:  Once a month she will fast intermittently for 12 to 14 hours and then consume beans and cabbage for the remainder of the day. She has developed a discomfort to her right upper abdomen. She denies GI/ complications such as nausea, vomiting, diarrhea, constipation, decreased appetite, urinary frequency or dysuria. She has noted increased gas and bloating. She has not tried over-the-counter gas relief. She believes this discomfort is related to gas buildup. Review of Systems:  Pertinent items are noted in HPI. Past Medical History:   Diagnosis Date    Anxiety 4/5/2021    Diabetes ([facility] [address]     Essential hypertension 4/5/2021    Hyperlipidemia 4/5/2021    OAB (overactive bladder) 4/5/2021    Parkinson's disease ([facility] [address] 4/5/2021    Primary osteoarthritis of both knees 4/5/2021    Radiculopathy of arm 4/5/2021    RLS (restless legs syndrome) 4/5/2021    Sleep apnea 4/5/2021       Current Outpatient Medications on File Prior to Visit   Medication Sig Dispense Refill    pramipexole (Mirapex) 1.5 mg tablet Take 1 Tablet by mouth nightly for 90 days. Indications: restless legs syndrome, an extreme discomfort in the calf muscles when sitting or lying down 90 Tablet 0    carbidopa-levodopa (SINEMET)  mg per tablet TAKE 1 AND 1/2 TABLETS THREE TIMES DAILY 270 Tablet 0    metFORMIN (GLUCOPHAGE) 500 mg tablet Take 1 Tablet by mouth daily (with breakfast).  90 Tablet 1    pantoprazole (PROTONIX) 20 mg tablet Take 1 Tablet by mouth two (2) times daily as needed for PRN Reason (Other) (GERD). 180 Tablet 1    citalopram (CELEXA) 40 mg tablet Take 1 Tablet by mouth daily. Indications: repeated episodes of anxiety 90 Tablet 1    atorvastatin (LIPITOR) 20 mg tablet TAKE 1 TABLET EVERY OTHER DAY FOR HIGH CHOLESTEROL AND HIGH TRIGLYCERIDES 45 Tablet 1    aspirin delayed-release 81 mg tablet Take  by mouth daily. No current facility-administered medications on file prior to visit. Objective    Visit Vitals  BP (!) 121/57   Pulse 73   Temp 97.4 °F (36.3 °C) (Temporal)   Resp 18   Ht 5' 2\" (1.575 m)   Wt 194 lb 9.6 oz (88.3 kg)   SpO2 97%   BMI 35.59 kg/m²       Physical:  General:  alert, cooperative, no distress, appears stated age   Lung: clear to auscultation bilaterally   Heart:  regular rate and rhythm, S1, S2 normal, no murmur, click, rub or gallop   Abdomen: soft, non-tender. Bowel sounds normal. No masses,  no organomegaly, extremely active bowel sounds all 4 quadrants. No pain or discomfort with palpation. (Location is Rt upper Quad ABD)       Plan:  1. Bloating symptom  Encourage patient to reduce consumption of gas producing foods such as beans and cabbage. Take over-the-counter Beano gas or Pepto-Bismol to help with symptoms. Increase water intake  Stay active    2. Flatulence      RTC prn    Dr Ebenezer Garcia.  [place], AGNP-C, [place] [facility]

## 2022-11-29 DIAGNOSIS — G25.81 RLS (RESTLESS LEGS SYNDROME): ICD-10-CM

## 2022-11-29 RX ORDER — PRAMIPEXOLE DIHYDROCHLORIDE 1.5 MG/1
1.5 TABLET ORAL
Qty: 90 TABLET | Refills: 0 | OUTPATIENT
Start: 2022-11-29 | End: 2023-02-27

## 2023-01-24 ENCOUNTER — OFFICE VISIT (OUTPATIENT)
Dept: INTERNAL MEDICINE CLINIC | Age: 68
End: 2023-01-24
Payer: MEDICARE

## 2023-01-24 VITALS
SYSTOLIC BLOOD PRESSURE: 138 MMHG | HEART RATE: 70 BPM | RESPIRATION RATE: 16 BRPM | TEMPERATURE: 97.8 F | HEIGHT: 62 IN | DIASTOLIC BLOOD PRESSURE: 70 MMHG | BODY MASS INDEX: 35.7 KG/M2 | WEIGHT: 194 LBS | OXYGEN SATURATION: 98 %

## 2023-01-24 DIAGNOSIS — G20 PARKINSON DISEASE (HCC): ICD-10-CM

## 2023-01-24 DIAGNOSIS — R45.1 AGITATION: ICD-10-CM

## 2023-01-24 DIAGNOSIS — K21.9 GASTROESOPHAGEAL REFLUX DISEASE WITHOUT ESOPHAGITIS: ICD-10-CM

## 2023-01-24 DIAGNOSIS — M85.80 OSTEOPENIA, UNSPECIFIED LOCATION: ICD-10-CM

## 2023-01-24 DIAGNOSIS — F41.9 ANXIETY: ICD-10-CM

## 2023-01-24 DIAGNOSIS — E78.5 HYPERLIPIDEMIA, UNSPECIFIED HYPERLIPIDEMIA TYPE: Primary | ICD-10-CM

## 2023-01-24 DIAGNOSIS — E11.65 TYPE 2 DIABETES MELLITUS WITH HYPERGLYCEMIA, WITHOUT LONG-TERM CURRENT USE OF INSULIN (HCC): ICD-10-CM

## 2023-01-24 DIAGNOSIS — E66.01 SEVERE OBESITY (BMI 35.0-39.9) WITH COMORBIDITY (HCC): ICD-10-CM

## 2023-01-24 PROBLEM — M17.0 PRIMARY OSTEOARTHRITIS OF BOTH KNEES: Status: RESOLVED | Noted: 2021-04-05 | Resolved: 2023-01-24

## 2023-01-24 PROBLEM — M17.12 PRIMARY OSTEOARTHRITIS OF LEFT KNEE: Status: RESOLVED | Noted: 2022-01-24 | Resolved: 2023-01-24

## 2023-01-24 PROBLEM — G20.A1 PARKINSON DISEASE: Status: ACTIVE | Noted: 2021-04-05

## 2023-01-24 PROBLEM — D69.6 THROMBOCYTOPENIA (HCC): Status: ACTIVE | Noted: 2023-01-24

## 2023-01-24 PROBLEM — M54.10 RADICULOPATHY OF ARM: Status: RESOLVED | Noted: 2021-04-05 | Resolved: 2023-01-24

## 2023-01-24 LAB — HBA1C MFR BLD HPLC: 5.6 %

## 2023-01-24 PROCEDURE — G8417 CALC BMI ABV UP PARAM F/U: HCPCS | Performed by: NURSE PRACTITIONER

## 2023-01-24 PROCEDURE — G8399 PT W/DXA RESULTS DOCUMENT: HCPCS | Performed by: NURSE PRACTITIONER

## 2023-01-24 PROCEDURE — 1090F PRES/ABSN URINE INCON ASSESS: CPT | Performed by: NURSE PRACTITIONER

## 2023-01-24 PROCEDURE — G8432 DEP SCR NOT DOC, RNG: HCPCS | Performed by: NURSE PRACTITIONER

## 2023-01-24 PROCEDURE — 3078F DIAST BP <80 MM HG: CPT | Performed by: NURSE PRACTITIONER

## 2023-01-24 PROCEDURE — 99213 OFFICE O/P EST LOW 20 MIN: CPT | Performed by: NURSE PRACTITIONER

## 2023-01-24 PROCEDURE — 1101F PT FALLS ASSESS-DOCD LE1/YR: CPT | Performed by: NURSE PRACTITIONER

## 2023-01-24 PROCEDURE — 2022F DILAT RTA XM EVC RTNOPTHY: CPT | Performed by: NURSE PRACTITIONER

## 2023-01-24 PROCEDURE — 1123F ACP DISCUSS/DSCN MKR DOCD: CPT | Performed by: NURSE PRACTITIONER

## 2023-01-24 PROCEDURE — 3075F SYST BP GE 130 - 139MM HG: CPT | Performed by: NURSE PRACTITIONER

## 2023-01-24 PROCEDURE — G8427 DOCREV CUR MEDS BY ELIG CLIN: HCPCS | Performed by: NURSE PRACTITIONER

## 2023-01-24 PROCEDURE — G8536 NO DOC ELDER MAL SCRN: HCPCS | Performed by: NURSE PRACTITIONER

## 2023-01-24 PROCEDURE — 3044F HG A1C LEVEL LT 7.0%: CPT | Performed by: NURSE PRACTITIONER

## 2023-01-24 PROCEDURE — G9899 SCRN MAM PERF RSLTS DOC: HCPCS | Performed by: NURSE PRACTITIONER

## 2023-01-24 PROCEDURE — 3017F COLORECTAL CA SCREEN DOC REV: CPT | Performed by: NURSE PRACTITIONER

## 2023-01-24 PROCEDURE — 83036 HEMOGLOBIN GLYCOSYLATED A1C: CPT | Performed by: NURSE PRACTITIONER

## 2023-01-24 RX ORDER — PANTOPRAZOLE SODIUM 20 MG/1
20 TABLET, DELAYED RELEASE ORAL
Qty: 180 TABLET | Refills: 1 | Status: SHIPPED | OUTPATIENT
Start: 2023-01-24

## 2023-01-24 RX ORDER — METFORMIN HYDROCHLORIDE 500 MG/1
500 TABLET ORAL
Qty: 90 TABLET | Refills: 1 | Status: SHIPPED | OUTPATIENT
Start: 2023-01-24

## 2023-01-24 RX ORDER — CALCIUM CARBONATE 600 MG
600 TABLET ORAL 2 TIMES DAILY
Qty: 60 TABLET | Refills: 11
Start: 2023-01-24

## 2023-01-24 RX ORDER — CITALOPRAM 40 MG/1
TABLET, FILM COATED ORAL
Qty: 90 TABLET | Refills: 1 | Status: SHIPPED | OUTPATIENT
Start: 2023-01-24

## 2023-01-24 RX ORDER — ATORVASTATIN CALCIUM 20 MG/1
TABLET, FILM COATED ORAL
Qty: 45 TABLET | Refills: 1 | Status: SHIPPED | OUTPATIENT
Start: 2023-01-24

## 2023-01-24 NOTE — PROGRESS NOTES
Internists of [id number] [place] [address]  [place], [address]  [phone] UDRJYQ/[phone] fax    1/24/2023    No Whelan 1955 is a pleasant [address] female. Todays concern/HPI:  No complaints or concerns. Denies CP, SOB, GI/ issues, dizziness, fatigue. Tolerating medications w/o side effects. Salsa dancing a few days per week      Past Medical History:   Diagnosis Date    Anxiety 4/5/2021    Diabetes ([facility] [address]     Essential hypertension 4/5/2021    Hyperlipidemia 4/5/2021    OAB (overactive bladder) 4/5/2021    Parkinson's disease ([facility] [address] 4/5/2021    Primary osteoarthritis of both knees 4/5/2021    Radiculopathy of arm 4/5/2021    RLS (restless legs syndrome) 4/5/2021    Sleep apnea 4/5/2021     Current Outpatient Medications   Medication Sig    atorvastatin (LIPITOR) 20 mg tablet TAKE 1 TABLET EVERY OTHER DAY FOR HIGH CHOLESTEROL AND HIGH TRIGLYCERIDES    citalopram (CELEXA) 40 mg tablet TAKE 1 TABLET EVERY DAY FOR REPEATED EPISODES OF ANXIETY    metFORMIN (GLUCOPHAGE) 500 mg tablet Take 1 Tablet by mouth daily (with breakfast). pantoprazole (PROTONIX) 20 mg tablet Take 1 Tablet by mouth two (2) times daily as needed for PRN Reason (Other) (GERD). calcium carbonate (CALTREX) 600 mg calcium (1,500 mg) tablet Take 1 Tablet by mouth two (2) times a day. Indications: post-menopausal osteoporosis prevention    carbidopa-levodopa (SINEMET)  mg per tablet TAKE 1 AND 1/2 TABLETS THREE TIMES DAILY    aspirin delayed-release 81 mg tablet Take  by mouth daily. No current facility-administered medications for this visit.        Physical:   Visit Vitals  /70 Comment: manual   Pulse 70   Temp 97.8 °F (36.6 °C) (Temporal)   Resp 16   Ht 5' 2\" (1.575 m)   Wt 194 lb (88 kg)   LMP  (LMP Unknown)   SpO2 98%   BMI 35.48 kg/m²      Wt Readings from Last 3 Encounters:   01/24/23 194 lb (88 kg)   09/20/22 194 lb 9.6 oz (88.3 kg)   07/18/22 187 lb (84.8 kg) Exam:   Physical Exam  Constitutional:       Appearance: Normal appearance. She is obese. Eyes:      Extraocular Movements: Extraocular movements intact. Conjunctiva/sclera: Conjunctivae normal.   Neck:      Vascular: No carotid bruit. Cardiovascular:      Rate and Rhythm: Normal rate and regular rhythm. Pulmonary:      Effort: Pulmonary effort is normal.      Breath sounds: Normal breath sounds. Musculoskeletal:         General: Normal range of motion. Neurological:      Mental Status: She is alert and oriented to person, place, and time. Psychiatric:         Mood and Affect: Mood normal.      Body mass index is 35.48 kg/m². Review of Data:  POC A1c 5.6    Plan:    1. Hyperlipidemia, unspecified hyperlipidemia type  Assessment & Plan:  Continue statin therapy  Reduce fried fatty foods  Orders:  -     LIPID PANEL; Future  -     atorvastatin (LIPITOR) 20 mg tablet; TAKE 1 TABLET EVERY OTHER DAY FOR HIGH CHOLESTEROL AND HIGH TRIGLYCERIDES, Normal, Disp-45 Tablet, R-1  2. Type 2 diabetes mellitus with hyperglycemia, without long-term current use of insulin ([address]  Assessment & Plan:   well controlled, continue current medications   A1c 5.6  Continue Metformin therapy  Orders:  -     METABOLIC PANEL, COMPREHENSIVE; Future  -     HEMOGLOBIN A1C WITH EAG; Future  -     MICROALBUMIN, UR, RAND W/ MICROALB/CREAT RATIO; Future  -     metFORMIN (GLUCOPHAGE) 500 mg tablet; Take 1 Tablet by mouth daily (with breakfast). , Normal, Disp-90 Tablet, R-1  -     AMB POC HEMOGLOBIN A1C  3. Parkinson disease [facility])  Assessment & Plan:   monitored by specialist. No acute findings meriting change in the plan   Continue Sinemet therapy as prescribed  4. Anxiety  Assessment & Plan:  Celexa effective  Refilled     Orders:  -     citalopram (CELEXA) 40 mg tablet; TAKE 1 TABLET EVERY DAY FOR REPEATED EPISODES OF ANXIETY, Normal, Disp-90 Tablet, R-1  5.  Agitation  -     citalopram (CELEXA) 40 mg tablet; TAKE 1 TABLET EVERY DAY FOR REPEATED EPISODES OF ANXIETY, Normal, Disp-90 Tablet, R-1  6. Gastroesophageal reflux disease without esophagitis  Assessment & Plan:  Continue pantoprazole prn  Orders:  -     pantoprazole (PROTONIX) 20 mg tablet; Take 1 Tablet by mouth two (2) times daily as needed for PRN Reason (Other) (GERD). , Normal, Disp-180 Tablet, R-1  7. Osteopenia, unspecified location  Assessment & Plan:  Continue calcium/Vit D supplement  Stay active  Avoid caffeine  Orders:  -     calcium carbonate (CALTREX) 600 mg calcium (1,500 mg) tablet; Take 1 Tablet by mouth two (2) times a day. Indications: post-menopausal osteoporosis prevention, No Print, Disp-60 Tablet, R-11  8. Severe obesity (BMI 35.0-39. 9) with comorbidity ([address]  Assessment & Plan:  7lb wt gain in 6 months  Reduce carbs and sweets in diet   stay active with juan pablo    Reviewed medication and completed medication reconciliation with the patient. Reviewed side effects of medications with the patient. Questions were answered and patient verb understanding. History reviewed. No pertinent surgical history. Allergies and Intolerances: Allergies   Allergen Reactions    Latex Rash and Itching    Sulfa (Sulfonamide Antibiotics) Anaphylaxis     Family History:   Family History   Problem Relation Age of Onset    Thyroid Disease Mother     Hypertension Mother     Diabetes Brother     Diabetes Brother     Heart Attack Brother      Social History:   She  reports that she has never smoked.  She has never used smokeless tobacco.   Social History     Substance and Sexual Activity   Alcohol Use Not Currently     Immunization History:  Immunization History   Administered Date(s) Administered    COVID-19, MODERNA BLUE border, Primary or Immunocompromised, (age 18y+), IM, 100 mcg/0.5mL 02/15/2021, 06/14/2021    COVID-19, MODERNA Booster BLUE border, (age 18y+), IM, 50mcg/0.25mL 12/30/2021    Influenza, FLUAD, (age 72 y+), Adjuvanted 10/22/2021    Pneumococcal Polysaccharide (PPSV-23) 04/02/2021       Follow-up and Dispositions    Return in about 6 months (around 7/24/2023) for AWV/ACP, Lab-fasting (Lipid, CMP, A1c, microalbumin).          Dr. Meño Arenas, AGNP-C, [facility]  Internists of [address] [place] [address]

## 2023-01-24 NOTE — PROGRESS NOTES
Dima Patton presents today for   Chief Complaint   Patient presents with    Follow-up       Vitals:    01/24/23 1339 01/24/23 1347   BP: (!) 151/73 (!) 146/69   Pulse: 70    Resp: 16    Temp: 97.8 °F (36.6 °C)    TempSrc: Temporal    SpO2: 98%    Weight: 194 lb (88 kg)    Height: 5' 2\" (1.575 m)         Is someone accompanying this pt? no    Is the patient using any DME equipment during OV? no    Depression Screening:  3 most recent PHQ Screens 1/24/2023   Little interest or pleasure in doing things Not at all   Feeling down, depressed, irritable, or hopeless Not at all   Total Score PHQ 2 0   Trouble falling or staying asleep, or sleeping too much -   Feeling tired or having little energy -   Poor appetite, weight loss, or overeating -   Feeling bad about yourself - or that you are a failure or have let yourself or your family down -   Trouble concentrating on things such as school, work, reading, or watching TV -   Moving or speaking so slowly that other people could have noticed; or the opposite being so fidgety that others notice -   Thoughts of being better off dead, or hurting yourself in some way -   PHQ 9 Score -   How difficult have these problems made it for you to do your work, take care of your home and get along with others -       Learning Assessment:  Learning Assessment 4/2/2021   PRIMARY LEARNER Patient   HIGHEST LEVEL OF EDUCATION - PRIMARY LEARNER  DID NOT GRADUATE HIGH SCHOOL   BARRIERS PRIMARY LEARNER NONE   CO-LEARNER CAREGIVER No   PRIMARY LANGUAGE Azerbaijani   LEARNER PREFERENCE PRIMARY DEMONSTRATION   ANSWERED BY patient   RELATIONSHIP SELF       Abuse Screening:  Abuse Screening Questionnaire 7/18/2022   Do you ever feel afraid of your partner? N   Are you in a relationship with someone who physically or mentally threatens you? N   Is it safe for you to go home? Y       Fall Screening  Fall Risk Assessment, last 12 mths 1/24/2023   Able to walk?  Yes   Fall in past 12 months? 0   Do you feel unsteady? -   Are you worried about falling -   Is TUG test greater than 12 seconds? -   Is the gait abnormal? -   Number of falls in past 12 months -       Generalized Anxiety  No flowsheet data found. Health Maintenance Due   Topic Date Due    Diabetic Alb to Cr ratio (uACR) test  Never done    COVID-19 Vaccine (4 - Booster for Moderna series) 02/24/2022    Flu Vaccine (1) 08/01/2022   . Health Maintenance reviewed and discussed and ordered per Provider. Vaccines Due   Screenings Due       Keya Selin is updated on all HM    1. \"Have you been to the ER, urgent care clinic since your last visit? Hospitalized since your last visit? \" No    2. \"Have you seen or consulted any other health care providers outside of the [address] [place] [address] since your last visit? \" No     3. For patients aged 39-70: Has the patient had a colonoscopy / FIT/ Cologuard? Yes - Care Gap present. Most recent result on file     If the patient is female:    4. For patients aged 41-77: Has the patient had a mammogram within the past 2 years? Yes - Care Gap present. Most recent result on file    5. For patients aged 21-65: Has the patient had a pap smear?  NA - based on age

## 2023-01-24 NOTE — ASSESSMENT & PLAN NOTE
monitored by specialist. No acute findings meriting change in the plan   Continue Sinemet therapy as prescribed

## 2023-01-30 DIAGNOSIS — R92.8 ABNORMAL MAMMOGRAM OF LEFT BREAST: ICD-10-CM

## 2023-01-30 DIAGNOSIS — Z12.31 BREAST CANCER SCREENING BY MAMMOGRAM: Primary | ICD-10-CM

## 2023-02-03 DIAGNOSIS — D69.6 THROMBOCYTOPENIA (HCC): Primary | ICD-10-CM

## 2023-02-03 DIAGNOSIS — Z12.31 BREAST CANCER SCREENING BY MAMMOGRAM: Primary | ICD-10-CM

## 2023-02-03 DIAGNOSIS — R92.8 ABNORMAL MAMMOGRAM OF LEFT BREAST: ICD-10-CM

## 2023-02-05 DIAGNOSIS — E11.65 TYPE 2 DIABETES MELLITUS WITH HYPERGLYCEMIA, WITHOUT LONG-TERM CURRENT USE OF INSULIN (HCC): Primary | ICD-10-CM

## 2023-02-07 DIAGNOSIS — E78.5 HYPERLIPIDEMIA, UNSPECIFIED HYPERLIPIDEMIA TYPE: Primary | ICD-10-CM

## 2023-06-14 RX ORDER — ATORVASTATIN CALCIUM 20 MG/1
TABLET, FILM COATED ORAL
Qty: 45 TABLET | Refills: 0 | OUTPATIENT
Start: 2023-06-14

## 2023-06-14 RX ORDER — PANTOPRAZOLE SODIUM 20 MG/1
TABLET, DELAYED RELEASE ORAL
Qty: 180 TABLET | OUTPATIENT
Start: 2023-06-14

## 2023-06-14 NOTE — TELEPHONE ENCOUNTER
Please refill if appropriate or refuse medication if not appropriate.     PCP: Erika Cabrera DNP    Last appt: 1/24/2023    Last refill: 1/24/2023    Next Appt: 7/18/2023          Requested Prescriptions     Pending Prescriptions Disp Refills    metFORMIN (GLUCOPHAGE) 500 MG tablet [Pharmacy Med Name: Jesus Angles 500 MG Tablet] 90 tablet      Sig: TAKE 1 TABLET EVERY DAY WITH BREAKFAST    pantoprazole (PROTONIX) 20 MG tablet [Pharmacy Med Name: PANTOPRAZOLE SODIUM 20 MG Tablet Delayed Release] 180 tablet      Sig: TAKE 1 TABLET TWICE DAILY AS NEEDED FOR GERD

## 2023-06-21 ENCOUNTER — OFFICE VISIT (OUTPATIENT)
Age: 68
End: 2023-06-21
Payer: MEDICARE

## 2023-06-21 ENCOUNTER — HOSPITAL ENCOUNTER (OUTPATIENT)
Facility: HOSPITAL | Age: 68
Discharge: HOME | End: 2023-06-24
Payer: MEDICARE

## 2023-06-21 VITALS
SYSTOLIC BLOOD PRESSURE: 130 MMHG | HEART RATE: 66 BPM | HEIGHT: 61 IN | RESPIRATION RATE: 16 BRPM | TEMPERATURE: 98 F | WEIGHT: 194 LBS | DIASTOLIC BLOOD PRESSURE: 62 MMHG | OXYGEN SATURATION: 96 % | BODY MASS INDEX: 36.63 KG/M2

## 2023-06-21 DIAGNOSIS — M25.511 ACUTE PAIN OF RIGHT SHOULDER: Primary | ICD-10-CM

## 2023-06-21 DIAGNOSIS — M25.511 ACUTE PAIN OF RIGHT SHOULDER: ICD-10-CM

## 2023-06-21 PROBLEM — S46.001A INJURY OF RIGHT ROTATOR CUFF: Status: RESOLVED | Noted: 2023-06-21 | Resolved: 2023-06-21

## 2023-06-21 PROBLEM — S46.001A INJURY OF RIGHT ROTATOR CUFF: Status: ACTIVE | Noted: 2023-06-21

## 2023-06-21 PROCEDURE — 73030 X-RAY EXAM OF SHOULDER: CPT

## 2023-06-21 PROCEDURE — 99211 OFF/OP EST MAY X REQ PHY/QHP: CPT | Performed by: NURSE PRACTITIONER

## 2023-06-21 PROCEDURE — 3075F SYST BP GE 130 - 139MM HG: CPT | Performed by: NURSE PRACTITIONER

## 2023-06-21 PROCEDURE — 99213 OFFICE O/P EST LOW 20 MIN: CPT | Performed by: NURSE PRACTITIONER

## 2023-06-21 PROCEDURE — 1123F ACP DISCUSS/DSCN MKR DOCD: CPT | Performed by: NURSE PRACTITIONER

## 2023-06-21 PROCEDURE — 3078F DIAST BP <80 MM HG: CPT | Performed by: NURSE PRACTITIONER

## 2023-06-21 RX ORDER — TRAMADOL HYDROCHLORIDE 50 MG/1
50 TABLET ORAL EVERY 4 HOURS PRN
Qty: 28 TABLET | Refills: 0 | Status: SHIPPED | OUTPATIENT
Start: 2023-06-21 | End: 2023-06-28

## 2023-06-21 SDOH — ECONOMIC STABILITY: FOOD INSECURITY: WITHIN THE PAST 12 MONTHS, YOU WORRIED THAT YOUR FOOD WOULD RUN OUT BEFORE YOU GOT MONEY TO BUY MORE.: NEVER TRUE

## 2023-06-21 SDOH — ECONOMIC STABILITY: INCOME INSECURITY: HOW HARD IS IT FOR YOU TO PAY FOR THE VERY BASICS LIKE FOOD, HOUSING, MEDICAL CARE, AND HEATING?: NOT HARD AT ALL

## 2023-06-21 SDOH — ECONOMIC STABILITY: HOUSING INSECURITY
IN THE LAST 12 MONTHS, WAS THERE A TIME WHEN YOU DID NOT HAVE A STEADY PLACE TO SLEEP OR SLEPT IN A SHELTER (INCLUDING NOW)?: NO

## 2023-06-21 SDOH — ECONOMIC STABILITY: FOOD INSECURITY: WITHIN THE PAST 12 MONTHS, THE FOOD YOU BOUGHT JUST DIDN'T LAST AND YOU DIDN'T HAVE MONEY TO GET MORE.: NEVER TRUE

## 2023-06-21 NOTE — PROGRESS NOTES
[facility] [address] [address]  [address]  [phone] [facility]/[phone] fax    6/21/2023    Suki Hernandez 1955 is a pleasant White (non-) female. Todays concern/HPI:  Pain Rt shoulder down to elbow. Started 1 month ago. No trauma. She denies feeling/hearing a pop. She woke up with this pain. Having trouble brushing hair. Unable to place hand behind back-pain. Cant lift hand above head. Pain score 6-8/10. Taking ibuprofen. Not effective. Review of Systems - Negative except above    Past Medical History:   Diagnosis Date    Anxiety 4/5/2021    Diabetes ([facility] [address]     Essential hypertension 4/5/2021    Hyperlipidemia 4/5/2021    OAB (overactive bladder) 4/5/2021    Parkinson's disease ([facility] [address] 4/5/2021    Primary osteoarthritis of both knees 4/5/2021    Radiculopathy of arm 4/5/2021    RLS (restless legs syndrome) 4/5/2021    Sleep apnea 4/5/2021     Current Outpatient Medications   Medication Sig    traMADol (ULTRAM) 50 MG tablet Take 1 tablet by mouth every 4 hours as needed for Pain for up to 7 days. Intended supply: 3 days. Take lowest dose possible to manage pain Max Daily Amount: 300 mg    aspirin 81 MG EC tablet Take by mouth daily    atorvastatin (LIPITOR) 20 MG tablet TAKE 1 TABLET EVERY OTHER DAY FOR HIGH CHOLESTEROL AND HIGH TRIGLYCERIDES    carbidopa-levodopa (SINEMET)  MG per tablet TAKE 1 AND 1/2 TABLETS THREE TIMES DAILY    citalopram (CELEXA) 40 MG tablet Take 40 mg by mouth daily    metFORMIN (GLUCOPHAGE) 500 MG tablet Take 500 mg by mouth daily (with breakfast)    pantoprazole (PROTONIX) 20 MG tablet Take 20 mg by mouth 2 times daily as needed     No current facility-administered medications for this visit.        Physical:   /62 (Site: Left Upper Arm, Position: Sitting, Cuff Size: Large Adult)   Pulse 66   Temp 98 °F (36.7 °C) (Temporal)   Resp 16   Ht 5' 1\" (1.549 m)   Wt 194 lb (88 kg)   SpO2 96%   BMI 36.66 kg/m²

## 2023-06-21 NOTE — PROGRESS NOTES
Archie Hwang presents today for   Chief Complaint   Patient presents with    Arm Pain       /62 (Site: Left Upper Arm, Position: Sitting, Cuff Size: Large Adult)   Pulse 66   Temp 98 °F (36.7 °C) (Temporal)   Resp 16   Ht 5' 1\" (1.549 m)   Wt 194 lb (88 kg)   SpO2 96%   BMI 36.66 kg/m²      1. \"Have you been to the ER, urgent care clinic since your last visit? Hospitalized since your last visit? \" No    2. \"Have you seen or consulted any other health care providers outside of the [address] [place] [address] since your last visit? \" No     3. For patients aged 39-70: Has the patient had a colonoscopy / FIT/ Cologuard? Yes - no Care Gap present      If the patient is female:    4. For patients aged 41-77: Has the patient had a mammogram within the past 2 years? Yes - no Care Gap present      5. For patients aged 21-65: Has the patient had a pap smear?  Yes - no Care Gap present

## 2023-06-21 NOTE — ASSESSMENT & PLAN NOTE
DDx:  Rotator injury  Arthritis  Muscle strain  Cervical Radiculopathy     Due to symptoms:  Initiate tramadol for pain control  Discussed possible side effects  Order Xray to r/o arthritis  Will order MRI if xray not helpful with dx.

## 2023-06-27 DIAGNOSIS — M25.511 ACUTE PAIN OF RIGHT SHOULDER: ICD-10-CM

## 2023-06-27 RX ORDER — TRAMADOL HYDROCHLORIDE 50 MG/1
50 TABLET ORAL EVERY 6 HOURS PRN
Qty: 45 TABLET | Refills: 0 | Status: SHIPPED | OUTPATIENT
Start: 2023-06-27 | End: 2023-06-28 | Stop reason: SDUPTHER

## 2023-06-28 DIAGNOSIS — M25.511 ACUTE PAIN OF RIGHT SHOULDER: ICD-10-CM

## 2023-06-28 RX ORDER — TRAMADOL HYDROCHLORIDE 50 MG/1
50 TABLET ORAL EVERY 6 HOURS PRN
Qty: 12 TABLET | Refills: 0 | Status: SHIPPED | OUTPATIENT
Start: 2023-06-28 | End: 2023-07-01

## 2023-07-19 RX ORDER — ATORVASTATIN CALCIUM 20 MG/1
TABLET, FILM COATED ORAL
Qty: 45 TABLET | OUTPATIENT
Start: 2023-07-19

## 2023-07-25 ENCOUNTER — PATIENT MESSAGE (OUTPATIENT)
Age: 68
End: 2023-07-25

## 2023-07-26 ENCOUNTER — TELEPHONE (OUTPATIENT)
Age: 68
End: 2023-07-26

## 2023-07-26 RX ORDER — ATORVASTATIN CALCIUM 20 MG/1
TABLET, FILM COATED ORAL
Qty: 45 TABLET | Refills: 3 | Status: SHIPPED | OUTPATIENT
Start: 2023-07-26

## 2023-07-26 NOTE — TELEPHONE ENCOUNTER
PCP: Violetta Garrido DNP    Previous refill per chart  atorvastatin (LIPITOR) 20 MG tablet   7/18/2022     Sig: TAKE 1 TABLET EVERY OTHER DAY FOR HIGH CHOLESTEROL AND HIGH TRIGLYCERIDES    Class: Historical Med          Future Appointments   Date Time Provider [address] [place] [address]   8/15/2023  1:15 PM Violetta Garrido DNP [facility]

## 2023-07-30 NOTE — TELEPHONE ENCOUNTER
----- Message from Darwin Bustillos DNP sent at 7/29/2021  8:33 PM EDT -----  Please inform patient both her PVLs and ABIs were normal to her bilateral lower extremities. Remind her to elevate her lower extremities to help reduce swelling. Reduce sodium in diet, increase water intake, wear compression socks, be active, avoid having legs dependent longer than 30 minutes at a time.   Thank you
Pts daughter aware of message below and verbalized understanding. No further questions or concerns from pts daughter at this time.
Attending Attestation (For Attendings USE Only)...

## 2023-08-08 DIAGNOSIS — Z12.31 BREAST CANCER SCREENING BY MAMMOGRAM: ICD-10-CM

## 2023-08-08 DIAGNOSIS — R92.8 ABNORMAL MAMMOGRAM OF LEFT BREAST: ICD-10-CM

## 2023-08-14 ENCOUNTER — NURSE ONLY (OUTPATIENT)
Age: 68
End: 2023-08-14

## 2023-08-14 DIAGNOSIS — E78.5 HYPERLIPIDEMIA, UNSPECIFIED HYPERLIPIDEMIA TYPE: ICD-10-CM

## 2023-08-14 DIAGNOSIS — E11.65 TYPE 2 DIABETES MELLITUS WITH HYPERGLYCEMIA, WITHOUT LONG-TERM CURRENT USE OF INSULIN (HCC): ICD-10-CM

## 2023-08-14 DIAGNOSIS — D69.6 THROMBOCYTOPENIA (HCC): ICD-10-CM

## 2023-08-15 LAB
ALBUMIN SERPL-MCNC: 4 G/DL (ref 3.9–4.9)
ALBUMIN/CREAT UR: <12 MG/G CREAT (ref 0–29)
ALBUMIN/GLOB SERPL: 1.5 {RATIO} (ref 1.2–2.2)
ALP SERPL-CCNC: 110 IU/L (ref 44–121)
ALT SERPL-CCNC: 15 IU/L (ref 0–32)
AST SERPL-CCNC: 16 IU/L (ref 0–40)
BILIRUB SERPL-MCNC: 0.5 MG/DL (ref 0–1.2)
BUN SERPL-MCNC: 12 MG/DL (ref 8–27)
BUN/CREAT SERPL: 17 (ref 12–28)
CALCIUM SERPL-MCNC: 9.5 MG/DL (ref 8.7–10.3)
CHLORIDE SERPL-SCNC: 103 MMOL/L (ref 96–106)
CHOLEST SERPL-MCNC: 109 MG/DL (ref 100–199)
CO2 SERPL-SCNC: 26 MMOL/L (ref 20–29)
CREAT SERPL-MCNC: 0.69 MG/DL (ref 0.57–1)
CREAT UR-MCNC: 24.4 MG/DL
EGFRCR SERPLBLD CKD-EPI 2021: 94 ML/MIN/1.73
GLOBULIN SER CALC-MCNC: 2.6 G/DL (ref 1.5–4.5)
GLUCOSE SERPL-MCNC: 84 MG/DL (ref 70–99)
HBA1C MFR BLD: 5.9 % (ref 4.8–5.6)
HDLC SERPL-MCNC: 37 MG/DL
LDLC SERPL CALC-MCNC: 52 MG/DL (ref 0–99)
MICROALBUMIN UR-MCNC: <3 UG/ML
POTASSIUM SERPL-SCNC: 5.3 MMOL/L (ref 3.5–5.2)
PROT SERPL-MCNC: 6.6 G/DL (ref 6–8.5)
SODIUM SERPL-SCNC: 142 MMOL/L (ref 134–144)
SPECIMEN STATUS REPORT: NORMAL
TRIGL SERPL-MCNC: 105 MG/DL (ref 0–149)
VLDLC SERPL CALC-MCNC: 20 MG/DL (ref 5–40)

## 2023-08-23 ENCOUNTER — OFFICE VISIT (OUTPATIENT)
Age: 68
End: 2023-08-23
Payer: MEDICARE

## 2023-08-23 VITALS
OXYGEN SATURATION: 98 % | DIASTOLIC BLOOD PRESSURE: 78 MMHG | SYSTOLIC BLOOD PRESSURE: 138 MMHG | HEIGHT: 61 IN | TEMPERATURE: 97.7 F | BODY MASS INDEX: 38.14 KG/M2 | RESPIRATION RATE: 17 BRPM | WEIGHT: 202 LBS | HEART RATE: 70 BPM

## 2023-08-23 DIAGNOSIS — I10 ESSENTIAL HYPERTENSION: ICD-10-CM

## 2023-08-23 DIAGNOSIS — G47.30 SLEEP APNEA, UNSPECIFIED TYPE: ICD-10-CM

## 2023-08-23 DIAGNOSIS — Z23 NEED FOR PROPHYLACTIC VACCINATION AND INOCULATION AGAINST VARICELLA: ICD-10-CM

## 2023-08-23 DIAGNOSIS — Z23 ENCOUNTER FOR IMMUNIZATION: ICD-10-CM

## 2023-08-23 DIAGNOSIS — Z12.31 ENCOUNTER FOR SCREENING MAMMOGRAM FOR BREAST CANCER: ICD-10-CM

## 2023-08-23 DIAGNOSIS — E11.65 TYPE 2 DIABETES MELLITUS WITH HYPERGLYCEMIA, WITHOUT LONG-TERM CURRENT USE OF INSULIN (HCC): ICD-10-CM

## 2023-08-23 DIAGNOSIS — F41.9 ANXIETY: ICD-10-CM

## 2023-08-23 DIAGNOSIS — K21.9 GASTROESOPHAGEAL REFLUX DISEASE WITHOUT ESOPHAGITIS: ICD-10-CM

## 2023-08-23 DIAGNOSIS — N32.81 OAB (OVERACTIVE BLADDER): ICD-10-CM

## 2023-08-23 DIAGNOSIS — G25.81 RLS (RESTLESS LEGS SYNDROME): ICD-10-CM

## 2023-08-23 DIAGNOSIS — G20 PARKINSON DISEASE (HCC): ICD-10-CM

## 2023-08-23 DIAGNOSIS — E87.5 HYPERKALEMIA: ICD-10-CM

## 2023-08-23 DIAGNOSIS — E78.5 HYPERLIPIDEMIA, UNSPECIFIED HYPERLIPIDEMIA TYPE: ICD-10-CM

## 2023-08-23 DIAGNOSIS — D69.6 THROMBOCYTOPENIA, UNSPECIFIED (HCC): ICD-10-CM

## 2023-08-23 DIAGNOSIS — Z23 NEED FOR PROPHYLACTIC VACCINATION AGAINST DIPHTHERIA-TETANUS-PERTUSSIS (DTP): ICD-10-CM

## 2023-08-23 DIAGNOSIS — Z00.00 MEDICARE ANNUAL WELLNESS VISIT, SUBSEQUENT: Primary | ICD-10-CM

## 2023-08-23 DIAGNOSIS — M85.80 OSTEOPENIA, UNSPECIFIED LOCATION: ICD-10-CM

## 2023-08-23 PROCEDURE — 99214 OFFICE O/P EST MOD 30 MIN: CPT | Performed by: NURSE PRACTITIONER

## 2023-08-23 PROCEDURE — 3044F HG A1C LEVEL LT 7.0%: CPT | Performed by: NURSE PRACTITIONER

## 2023-08-23 PROCEDURE — G8399 PT W/DXA RESULTS DOCUMENT: HCPCS | Performed by: NURSE PRACTITIONER

## 2023-08-23 PROCEDURE — 3078F DIAST BP <80 MM HG: CPT | Performed by: NURSE PRACTITIONER

## 2023-08-23 PROCEDURE — G8427 DOCREV CUR MEDS BY ELIG CLIN: HCPCS | Performed by: NURSE PRACTITIONER

## 2023-08-23 PROCEDURE — 1036F TOBACCO NON-USER: CPT | Performed by: NURSE PRACTITIONER

## 2023-08-23 PROCEDURE — 2022F DILAT RTA XM EVC RTNOPTHY: CPT | Performed by: NURSE PRACTITIONER

## 2023-08-23 PROCEDURE — 1123F ACP DISCUSS/DSCN MKR DOCD: CPT | Performed by: NURSE PRACTITIONER

## 2023-08-23 PROCEDURE — G0009 ADMIN PNEUMOCOCCAL VACCINE: HCPCS | Performed by: NURSE PRACTITIONER

## 2023-08-23 PROCEDURE — 3017F COLORECTAL CA SCREEN DOC REV: CPT | Performed by: NURSE PRACTITIONER

## 2023-08-23 PROCEDURE — G0447 BEHAVIOR COUNSEL OBESITY 15M: HCPCS | Performed by: NURSE PRACTITIONER

## 2023-08-23 PROCEDURE — 90670 PCV13 VACCINE IM: CPT | Performed by: NURSE PRACTITIONER

## 2023-08-23 PROCEDURE — PBSHW PNEUMOCOCCAL, PCV-13, PREVNAR 13, (AGE 6 WKS+), IM: Performed by: NURSE PRACTITIONER

## 2023-08-23 PROCEDURE — G0439 PPPS, SUBSEQ VISIT: HCPCS | Performed by: NURSE PRACTITIONER

## 2023-08-23 PROCEDURE — 3074F SYST BP LT 130 MM HG: CPT | Performed by: NURSE PRACTITIONER

## 2023-08-23 PROCEDURE — 99211 OFF/OP EST MAY X REQ PHY/QHP: CPT | Performed by: NURSE PRACTITIONER

## 2023-08-23 PROCEDURE — G8417 CALC BMI ABV UP PARAM F/U: HCPCS | Performed by: NURSE PRACTITIONER

## 2023-08-23 PROCEDURE — 1090F PRES/ABSN URINE INCON ASSESS: CPT | Performed by: NURSE PRACTITIONER

## 2023-08-23 RX ORDER — CITALOPRAM 40 MG/1
40 TABLET ORAL DAILY
Qty: 90 TABLET | Refills: 1 | Status: SHIPPED | OUTPATIENT
Start: 2023-08-23

## 2023-08-23 RX ORDER — ATORVASTATIN CALCIUM 20 MG/1
TABLET, FILM COATED ORAL
Qty: 50 TABLET | Refills: 3 | Status: SHIPPED | OUTPATIENT
Start: 2023-08-23

## 2023-08-23 RX ORDER — PANTOPRAZOLE SODIUM 20 MG/1
20 TABLET, DELAYED RELEASE ORAL 2 TIMES DAILY PRN
Qty: 186 TABLET | Refills: 3 | Status: SHIPPED | OUTPATIENT
Start: 2023-08-23

## 2023-08-23 RX ORDER — OXYBUTYNIN CHLORIDE 5 MG/1
5 TABLET, EXTENDED RELEASE ORAL DAILY
Qty: 93 TABLET | Refills: 3 | Status: SHIPPED | OUTPATIENT
Start: 2023-08-23

## 2023-08-23 ASSESSMENT — PATIENT HEALTH QUESTIONNAIRE - PHQ9
SUM OF ALL RESPONSES TO PHQ QUESTIONS 1-9: 0
1. LITTLE INTEREST OR PLEASURE IN DOING THINGS: 0
2. FEELING DOWN, DEPRESSED OR HOPELESS: 0
1. LITTLE INTEREST OR PLEASURE IN DOING THINGS: 0
SUM OF ALL RESPONSES TO PHQ QUESTIONS 1-9: 0
SUM OF ALL RESPONSES TO PHQ9 QUESTIONS 1 & 2: 0
SUM OF ALL RESPONSES TO PHQ9 QUESTIONS 1 & 2: 0
SUM OF ALL RESPONSES TO PHQ QUESTIONS 1-9: 0
SUM OF ALL RESPONSES TO PHQ QUESTIONS 1-9: 0
SUM OF ALL RESPONSES TO PHQ9 QUESTIONS 1 & 2: 0
SUM OF ALL RESPONSES TO PHQ QUESTIONS 1-9: 0
1. LITTLE INTEREST OR PLEASURE IN DOING THINGS: 0
SUM OF ALL RESPONSES TO PHQ QUESTIONS 1-9: 0
SUM OF ALL RESPONSES TO PHQ QUESTIONS 1-9: 0
2. FEELING DOWN, DEPRESSED OR HOPELESS: 0
2. FEELING DOWN, DEPRESSED OR HOPELESS: 0
SUM OF ALL RESPONSES TO PHQ QUESTIONS 1-9: 0

## 2023-08-23 ASSESSMENT — LIFESTYLE VARIABLES
HOW MANY STANDARD DRINKS CONTAINING ALCOHOL DO YOU HAVE ON A TYPICAL DAY: PATIENT DOES NOT DRINK
HOW MANY STANDARD DRINKS CONTAINING ALCOHOL DO YOU HAVE ON A TYPICAL DAY: PATIENT DOES NOT DRINK
HOW OFTEN DO YOU HAVE A DRINK CONTAINING ALCOHOL: NEVER
HOW OFTEN DO YOU HAVE A DRINK CONTAINING ALCOHOL: NEVER

## 2023-08-24 PROBLEM — E11.65 TYPE 2 DIABETES MELLITUS WITH HYPERGLYCEMIA, WITHOUT LONG-TERM CURRENT USE OF INSULIN (HCC): Status: ACTIVE | Noted: 2021-04-05

## 2023-08-24 PROBLEM — E66.01 SEVERE OBESITY (BMI 35.0-39.9) WITH COMORBIDITY (HCC): Status: RESOLVED | Noted: 2023-01-24 | Resolved: 2023-08-24

## 2023-08-24 PROBLEM — Z00.00 MEDICARE ANNUAL WELLNESS VISIT, SUBSEQUENT: Status: ACTIVE | Noted: 2023-08-24

## 2023-08-24 PROBLEM — G20 PARKINSON DISEASE (HCC): Status: ACTIVE | Noted: 2021-04-05

## 2023-08-24 PROBLEM — E87.5 HYPERKALEMIA: Status: ACTIVE | Noted: 2023-08-24

## 2023-08-24 PROBLEM — R45.1 AGITATION: Status: RESOLVED | Noted: 2023-01-24 | Resolved: 2023-08-24

## 2023-08-24 PROBLEM — E78.5 HYPERLIPIDEMIA: Status: ACTIVE | Noted: 2021-04-05

## 2023-08-24 PROBLEM — G25.81 RLS (RESTLESS LEGS SYNDROME): Status: ACTIVE | Noted: 2021-04-05

## 2023-08-24 PROBLEM — G20.A1 PARKINSON DISEASE: Status: ACTIVE | Noted: 2021-04-05

## 2023-08-24 PROBLEM — D69.6 THROMBOCYTOPENIA, UNSPECIFIED (HCC): Status: ACTIVE | Noted: 2023-01-24

## 2023-08-24 PROBLEM — M85.80 OSTEOPENIA: Status: ACTIVE | Noted: 2023-01-24

## 2023-08-24 PROBLEM — M25.511 ACUTE PAIN OF RIGHT SHOULDER: Status: RESOLVED | Noted: 2023-06-21 | Resolved: 2023-08-24

## 2023-08-24 NOTE — ASSESSMENT & PLAN NOTE
Well-controlled, continue current medications and lifestyle modifications recommended   A1c 5.8-5.9  Microalbumin good    Reviewed current value and goal related to age, discussed dietary changes including to select low sugar and low simple carbohydrates and eating stable protein throughout the day, medications with the correct way to take them and side effects that should be reported such as muscle pain, weakness, near syncope. Discussed consequences of poor management with vascular stress, increased occurrence of neuropathy causing pain and disability, decreased circulation and increased occurrence of infection resulting in extremity amputation and general illness, and increased incidence of death by renal failure, stroke and MI.     Educated patient on the importance of maintaining proper skin/foot care as diabetics can have poor vascular circulation that could lead to ulcers. Educated patient on the importance of following up with a podiatrist and if unable to see a podiatrist then to make sure they cut the nails straight across to prevent any trauma to the skin. Educated patient on the importance of wearing proper fitting shoes, avoid soaking feet in hot water or Epson salt, and never walk barefoot as this exposes the feet to possible trauma. Refer to Pharm. D.   No

## 2023-08-24 NOTE — ASSESSMENT & PLAN NOTE
Review Data with Patient and Family member:  ; LDL 52  K+ 5.8  A1c 5.8-5.9  Microalbumin good    HM:  Ordered mammo  Tdap, shingles, Covid-local pharm  PNA 13 today    Specialists:  Dr Evon Garcia, optometry  8/2023  Dr Gunjan Franco, neuro

## 2023-08-24 NOTE — ASSESSMENT & PLAN NOTE
Monitored by specialist- no acute findings meriting change in the plan   Dr Jerel Beltrán as prescribed by neuro

## 2023-08-24 NOTE — ASSESSMENT & PLAN NOTE
Well-controlled, continue current medications   ; LDL 52  Cont statin    Reduce fried fatty or oily foods in diet  Limit red meats, processed foods, and alcohol. Limit fast food  Increase physical activity to 60 minutes of moderate intensity aerobic exercise per week. Increase water intake  Reduce alcohol intake    How to raise HDL if low:  Consume oats, beans, legumes, olive oil, whole grains, nuts, seeds, fatty fish, and berries.   Lose weight/fat  Reduce alcohol consumption  Smoking cessation

## 2023-08-24 NOTE — ASSESSMENT & PLAN NOTE
Well-controlled, continue current medications   Cont gabapentin as prescribed by neuro  Dr Johnn Skiff

## 2023-08-24 NOTE — ASSESSMENT & PLAN NOTE
6/2021 DEXA  Pt has osteopenia (weak bones). Lifestyle measures should be adopted universally to reduce bone loss in postmenopausal women these include adequate calcium and vitamin D, exercise, smoking cessation, limit caffeine to 1-2 servings per day, and avoidance of heavy alcohol consumption. Reiterate importance of fall precautions. It is recommended 1200 mg of supplemental calcium daily, and 800 international units of vitamin D daily. Have patient inquire with pharmacist the best supplement to use. Caltrate is a good choice.

## 2023-08-26 DIAGNOSIS — F41.9 ANXIETY: ICD-10-CM

## 2023-08-29 RX ORDER — CITALOPRAM 40 MG/1
TABLET ORAL
Qty: 90 TABLET | Refills: 1 | OUTPATIENT
Start: 2023-08-29

## 2023-09-23 PROBLEM — Z00.00 MEDICARE ANNUAL WELLNESS VISIT, SUBSEQUENT: Status: RESOLVED | Noted: 2023-08-24 | Resolved: 2023-09-23

## 2023-12-26 ENCOUNTER — TELEMEDICINE (OUTPATIENT)
Age: 68
End: 2023-12-26
Payer: MEDICARE

## 2023-12-26 DIAGNOSIS — J06.9 URTI (ACUTE UPPER RESPIRATORY INFECTION): Primary | ICD-10-CM

## 2023-12-26 PROCEDURE — 1090F PRES/ABSN URINE INCON ASSESS: CPT | Performed by: INTERNAL MEDICINE

## 2023-12-26 PROCEDURE — G8399 PT W/DXA RESULTS DOCUMENT: HCPCS | Performed by: INTERNAL MEDICINE

## 2023-12-26 PROCEDURE — G8427 DOCREV CUR MEDS BY ELIG CLIN: HCPCS | Performed by: INTERNAL MEDICINE

## 2023-12-26 PROCEDURE — 1123F ACP DISCUSS/DSCN MKR DOCD: CPT | Performed by: INTERNAL MEDICINE

## 2023-12-26 PROCEDURE — 99213 OFFICE O/P EST LOW 20 MIN: CPT | Performed by: INTERNAL MEDICINE

## 2023-12-26 PROCEDURE — 3017F COLORECTAL CA SCREEN DOC REV: CPT | Performed by: INTERNAL MEDICINE

## 2023-12-26 RX ORDER — AZITHROMYCIN 250 MG/1
250 TABLET, FILM COATED ORAL SEE ADMIN INSTRUCTIONS
Qty: 6 TABLET | Refills: 0 | Status: SHIPPED | OUTPATIENT
Start: 2023-12-26 | End: 2023-12-31

## 2023-12-26 RX ORDER — PREDNISONE 10 MG/1
10 TABLET ORAL 2 TIMES DAILY
Qty: 10 TABLET | Refills: 0 | Status: SHIPPED | OUTPATIENT
Start: 2023-12-26 | End: 2023-12-31

## 2023-12-26 RX ORDER — BENZONATATE 100 MG/1
100 CAPSULE ORAL 3 TIMES DAILY PRN
Qty: 30 CAPSULE | Refills: 0 | Status: SHIPPED | OUTPATIENT
Start: 2023-12-26 | End: 2024-01-05

## 2023-12-26 NOTE — PROGRESS NOTES
Sheree Jones, was evaluated through a synchronous (real-time) audio-video encounter. The patient (or guardian if applicable) is aware that this is a billable service, which includes applicable co-pays. This Virtual Visit was conducted with patient's (and/or legal guardian's) consent. Patient identification was verified, and a caregiver was present when appropriate. The patient was located at Home: [place] [address]  Provider was located at [facility] (Appt Dept): [facility], [address] [place] [address],  [address] [place] [address]      Sheree Jones (:  1955) is a Established patient, presenting virtually for evaluation of the following:    Assessment & Plan   Below is the assessment and plan developed based on review of pertinent history, physical exam, labs, studies, and medications. 1. URTI (acute upper respiratory infection)  -     azithromycin (ZITHROMAX) 250 MG tablet; Take 1 tablet by mouth See Admin Instructions for 5 days 500mg on day 1 followed by 250mg on days 2 - 5, Disp-6 tablet, R-0Normal  -     benzonatate (TESSALON) 100 MG capsule; Take 1 capsule by mouth 3 times daily as needed for Cough, Disp-30 capsule, R-0Normal  -     predniSONE (DELTASONE) 10 MG tablet; Take 1 tablet by mouth 2 times daily for 5 days, Disp-10 tablet, R-0Normal    No follow-ups on file. Subjective   HPI  Patient is leaving for next 10 days. No no runny nose, no sinus pain. Patient has been taking cough medicines, allergy medications without much relief. Patient has sore throat and greenish sputum today. No fever, some chills. No chest pain or shortness of breath. No wheezing. Patient tested COVID-negative today.       ROS as above       Objective   Patient-Reported Vitals  No data recorded     Physical Exam  [INSTRUCTIONS:  \"[x]\" Indicates a positive item  \"[]\" Indicates a negative item  -- DELETE ALL ITEMS NOT EXAMINED]    Constitutional: [x] Appears well-developed and well-nourished [x] No

## 2023-12-26 NOTE — PROGRESS NOTES
Brisa Medina presents today for   Chief Complaint   Patient presents with    Cough     C/o non productive cough, dyspnea, headaches, chills, sneezing and dry eyes. Patient is doing an at home Covid test now. 1. \"Have you been to the ER, urgent care clinic since your last visit? Hospitalized since your last visit? \" no    2. \"Have you seen or consulted any other health care providers outside of the [address] [place] [address] since your last visit? \" no     3. For patients aged 43-73: Has the patient had a colonoscopy / FIT/ Cologuard? Yes - no Care Gap present      If the patient is female:    4. For patients aged 43-66: Has the patient had a mammogram within the past 2 years? Yes - no Care Gap present      5. For patients aged 21-65: Has the patient had a pap smear?  NA - based on age or sex

## 2024-01-01 NOTE — TELEPHONE ENCOUNTER
PT daughter called stated her mother is having pain in her right side of her abdomen. Would like to make appt?     PT# [phone]    Please and thank you Statement Selected

## 2024-01-10 ENCOUNTER — HOSPITAL ENCOUNTER (OUTPATIENT)
Facility: HOSPITAL | Age: 69
Setting detail: SPECIMEN
Discharge: HOME | End: 2024-01-13

## 2024-01-10 LAB
ALBUMIN SERPL-MCNC: 4.3 G/DL (ref 3.5–5)
ALBUMIN/GLOB SERPL: 1.5 (ref 1.1–2.2)
ALP SERPL-CCNC: 74 U/L (ref 45–117)
ALT SERPL-CCNC: 20 U/L (ref 12–78)
ANION GAP SERPL CALC-SCNC: 3 MMOL/L (ref 5–15)
AST SERPL-CCNC: 18 U/L (ref 15–37)
BILIRUB SERPL-MCNC: 0.5 MG/DL (ref 0.2–1)
BUN SERPL-MCNC: 18 MG/DL (ref 6–20)
BUN/CREAT SERPL: 25 (ref 12–20)
CALCIUM SERPL-MCNC: 9.2 MG/DL (ref 8.5–10.1)
CHLORIDE SERPL-SCNC: 104 MMOL/L (ref 97–108)
CHOLEST SERPL-MCNC: 147 MG/DL
CO2 SERPL-SCNC: 30 MMOL/L (ref 21–32)
CREAT SERPL-MCNC: 0.72 MG/DL (ref 0.55–1.02)
CREAT UR-MCNC: 189 MG/DL
GLOBULIN SER CALC-MCNC: 2.9 G/DL (ref 2–4)
GLUCOSE SERPL-MCNC: 119 MG/DL (ref 65–100)
HDLC SERPL-MCNC: 51 MG/DL
HDLC SERPL: 2.9 (ref 0–5)
LDLC SERPL CALC-MCNC: 58.8 MG/DL (ref 0–100)
MICROALBUMIN UR-MCNC: 1.96 MG/DL
MICROALBUMIN/CREAT UR-RTO: 10 MG/G (ref 0–30)
POTASSIUM SERPL-SCNC: 4 MMOL/L (ref 3.5–5.1)
PROT SERPL-MCNC: 7.2 G/DL (ref 6.4–8.2)
SODIUM SERPL-SCNC: 137 MMOL/L (ref 136–145)
TRIGL SERPL-MCNC: 186 MG/DL
VLDLC SERPL CALC-MCNC: 37.2 MG/DL

## 2024-01-10 PROCEDURE — 83036 HEMOGLOBIN GLYCOSYLATED A1C: CPT

## 2024-01-10 PROCEDURE — 82570 ASSAY OF URINE CREATININE: CPT

## 2024-01-10 PROCEDURE — 36415 COLL VENOUS BLD VENIPUNCTURE: CPT

## 2024-01-10 PROCEDURE — 80053 COMPREHEN METABOLIC PANEL: CPT

## 2024-01-10 PROCEDURE — 82043 UR ALBUMIN QUANTITATIVE: CPT

## 2024-01-10 PROCEDURE — 80061 LIPID PANEL: CPT

## 2024-01-11 LAB
EST. AVERAGE GLUCOSE BLD GHB EST-MCNC: 123 MG/DL
HBA1C MFR BLD: 5.9 % (ref 4–5.6)

## 2024-01-17 DIAGNOSIS — F41.9 ANXIETY: ICD-10-CM

## 2024-01-17 DIAGNOSIS — E11.65 TYPE 2 DIABETES MELLITUS WITH HYPERGLYCEMIA, WITHOUT LONG-TERM CURRENT USE OF INSULIN (HCC): ICD-10-CM

## 2024-01-17 RX ORDER — CITALOPRAM 40 MG/1
40 TABLET ORAL DAILY
Qty: 90 TABLET | Refills: 3 | OUTPATIENT
Start: 2024-01-17

## 2024-02-14 ENCOUNTER — OFFICE VISIT (OUTPATIENT)
Age: 69
End: 2024-02-14
Payer: MEDICARE

## 2024-02-14 ENCOUNTER — HOSPITAL ENCOUNTER (OUTPATIENT)
Facility: HOSPITAL | Age: 69
Discharge: HOME | End: 2024-02-17
Payer: MEDICARE

## 2024-02-14 VITALS
RESPIRATION RATE: 16 BRPM | WEIGHT: 202 LBS | BODY MASS INDEX: 37.17 KG/M2 | TEMPERATURE: 98.1 F | HEART RATE: 67 BPM | SYSTOLIC BLOOD PRESSURE: 130 MMHG | OXYGEN SATURATION: 100 % | HEIGHT: 62 IN | DIASTOLIC BLOOD PRESSURE: 80 MMHG

## 2024-02-14 DIAGNOSIS — M25.512 ACUTE PAIN OF LEFT SHOULDER: ICD-10-CM

## 2024-02-14 DIAGNOSIS — F41.9 ANXIETY: ICD-10-CM

## 2024-02-14 DIAGNOSIS — E11.65 TYPE 2 DIABETES MELLITUS WITH HYPERGLYCEMIA, WITHOUT LONG-TERM CURRENT USE OF INSULIN (HCC): Primary | ICD-10-CM

## 2024-02-14 DIAGNOSIS — I10 ESSENTIAL HYPERTENSION: ICD-10-CM

## 2024-02-14 DIAGNOSIS — J30.89 ENVIRONMENTAL AND SEASONAL ALLERGIES: ICD-10-CM

## 2024-02-14 DIAGNOSIS — L73.9 CHRONIC FOLLICULITIS: ICD-10-CM

## 2024-02-14 DIAGNOSIS — N32.81 OAB (OVERACTIVE BLADDER): ICD-10-CM

## 2024-02-14 DIAGNOSIS — E78.2 MIXED HYPERLIPIDEMIA: ICD-10-CM

## 2024-02-14 PROCEDURE — 1090F PRES/ABSN URINE INCON ASSESS: CPT | Performed by: NURSE PRACTITIONER

## 2024-02-14 PROCEDURE — 3075F SYST BP GE 130 - 139MM HG: CPT | Performed by: NURSE PRACTITIONER

## 2024-02-14 PROCEDURE — G8399 PT W/DXA RESULTS DOCUMENT: HCPCS | Performed by: NURSE PRACTITIONER

## 2024-02-14 PROCEDURE — 3017F COLORECTAL CA SCREEN DOC REV: CPT | Performed by: NURSE PRACTITIONER

## 2024-02-14 PROCEDURE — 99215 OFFICE O/P EST HI 40 MIN: CPT | Performed by: NURSE PRACTITIONER

## 2024-02-14 PROCEDURE — 3044F HG A1C LEVEL LT 7.0%: CPT | Performed by: NURSE PRACTITIONER

## 2024-02-14 PROCEDURE — G8484 FLU IMMUNIZE NO ADMIN: HCPCS | Performed by: NURSE PRACTITIONER

## 2024-02-14 PROCEDURE — 3079F DIAST BP 80-89 MM HG: CPT | Performed by: NURSE PRACTITIONER

## 2024-02-14 PROCEDURE — G8417 CALC BMI ABV UP PARAM F/U: HCPCS | Performed by: NURSE PRACTITIONER

## 2024-02-14 PROCEDURE — 1123F ACP DISCUSS/DSCN MKR DOCD: CPT | Performed by: NURSE PRACTITIONER

## 2024-02-14 PROCEDURE — 1036F TOBACCO NON-USER: CPT | Performed by: NURSE PRACTITIONER

## 2024-02-14 PROCEDURE — 2022F DILAT RTA XM EVC RTNOPTHY: CPT | Performed by: NURSE PRACTITIONER

## 2024-02-14 PROCEDURE — 73030 X-RAY EXAM OF SHOULDER: CPT

## 2024-02-14 PROCEDURE — G8428 CUR MEDS NOT DOCUMENT: HCPCS | Performed by: NURSE PRACTITIONER

## 2024-02-14 RX ORDER — OXYBUTYNIN CHLORIDE 10 MG/1
10 TABLET, EXTENDED RELEASE ORAL DAILY
Qty: 95 TABLET | Refills: 1 | Status: SHIPPED | OUTPATIENT
Start: 2024-02-14

## 2024-02-14 RX ORDER — CITALOPRAM 40 MG/1
40 TABLET ORAL DAILY
Qty: 95 TABLET | Refills: 1 | Status: SHIPPED | OUTPATIENT
Start: 2024-02-14

## 2024-02-14 RX ORDER — DICLOFENAC SODIUM 75 MG/1
75 TABLET, DELAYED RELEASE ORAL 2 TIMES DAILY
Qty: 60 TABLET | Refills: 0 | Status: SHIPPED | OUTPATIENT
Start: 2024-02-14

## 2024-02-14 NOTE — PROGRESS NOTES
[facility]  [address]  [address]  [place], [place] [address]  [phone] office/[phone] fax    2/14/2024    Dena Oglesby 1955 is a pleasant  /  female.     Todays concern/HPI:  OAB. Oxybutynin 5mg no longer effective.   DM. Metformin. Not checking BS.   Anxiety. Cont celexa.   Folliculitis. Chronic. Saw darío Kaye. Used tx but areas never go away. Used special shampoos. Using tar shampoo now.   Dry cough. Postnasal drip. Coughing up white phlegm. Has nasal spray from allergist.   Left shoulder to hand pain started 2 weeks ago. Worse at night while sleeping. Pain wakes her. Heating pad, massage, tylenol, motrin.       Review of Systems - Negative except above    Physical:   /80 (Site: Left Upper Arm, Position: Sitting, Cuff Size: Large Adult)   Pulse 67   Temp 98.1 °F (36.7 °C) (Temporal)   Resp 16   Ht 1.575 m (5' 2\")   Wt 91.6 kg (202 lb)   SpO2 100%   BMI 36.95 kg/m²     Exam:   Physical Exam  Constitutional:       Appearance: Normal appearance. She is obese.   Eyes:      Extraocular Movements: Extraocular movements intact.      Conjunctiva/sclera: Conjunctivae normal.   Neck:      Vascular: No carotid bruit.   Cardiovascular:      Rate and Rhythm: Normal rate and regular rhythm.   Pulmonary:      Effort: Pulmonary effort is normal.      Breath sounds: Normal breath sounds.   Musculoskeletal:         General: Normal range of motion.      Comments: No limitation noted to Left shoulder   Skin:     General: Skin is warm and dry.      Comments: Small bump noted to top of scalp. No redness or s/s of infec.   Neurological:      Mental Status: She is alert and oriented to person, place, and time.   Psychiatric:         Mood and Affect: Mood normal.       Body mass index is 36.95 kg/m².       Plan:    1. Type 2 diabetes mellitus with hyperglycemia, without long-term current use of insulin ([facility])  Assessment & Plan:   Well-controlled, continue

## 2024-02-14 NOTE — ASSESSMENT & PLAN NOTE
Pt is seeking pain med as OTC is not effective.  Will escribe diclofenac  Xray of shoulder today

## 2024-02-14 NOTE — ASSESSMENT & PLAN NOTE
Uncontrolled, Take oxybutynin 5mg 2 tabs til all gone then start oxybutynin 10mg qd  Will increase dose as 5mg no longer effective.   If the 10 mg is not effective, will refer to urology for possible PFPT

## 2024-02-14 NOTE — ASSESSMENT & PLAN NOTE
Has tried over-the-counter T-Gel, not effective  Saw dermatologist years ago and was prescribed clindamycin solution.  This was effective  Well refill this medication.  If medication not effective, patient will need to follow-up with Derm

## 2024-02-16 ENCOUNTER — TELEPHONE (OUTPATIENT)
Age: 69
End: 2024-02-16

## 2024-02-16 NOTE — TELEPHONE ENCOUNTER
----- Message from Porsche Aquino DNP sent at 2/15/2024  3:52 PM EST -----  [facility] nurses, pls contact patient with the following: (Please do not send message via [employer], contact them via phone)    X-ray of left shoulder reveals mild degenerative changes i.e. arthritis.  I sent in diclofenac to her pharmacy the day of her appointment.  This will help with inflammation.  She can also apply heating pad.  If she would like to do physical therapy let me know and we can send in a referral.      Thank you

## 2024-02-18 PROBLEM — J06.9 URTI (ACUTE UPPER RESPIRATORY INFECTION): Status: RESOLVED | Noted: 2023-12-26 | Resolved: 2024-02-18

## 2024-02-18 PROBLEM — D69.6 THROMBOCYTOPENIA, UNSPECIFIED (HCC): Status: RESOLVED | Noted: 2023-01-24 | Resolved: 2024-02-18

## 2024-02-18 PROBLEM — J30.89 ENVIRONMENTAL AND SEASONAL ALLERGIES: Status: ACTIVE | Noted: 2024-02-18

## 2024-02-18 RX ORDER — FLUTICASONE PROPIONATE 50 MCG
1 SPRAY, SUSPENSION (ML) NASAL DAILY
Qty: 32 G | Refills: 1 | COMMUNITY
Start: 2024-02-18

## 2024-03-15 ENCOUNTER — HOSPITAL ENCOUNTER (OUTPATIENT)
Facility: HOSPITAL | Age: 69
End: 2024-03-15
Payer: MEDICARE

## 2024-03-15 VITALS — WEIGHT: 202 LBS | HEIGHT: 63 IN | BODY MASS INDEX: 35.79 KG/M2

## 2024-03-15 DIAGNOSIS — Z12.31 VISIT FOR SCREENING MAMMOGRAM: ICD-10-CM

## 2024-03-15 PROCEDURE — 77067 SCR MAMMO BI INCL CAD: CPT

## 2024-03-21 ENCOUNTER — TELEMEDICINE (OUTPATIENT)
Age: 69
End: 2024-03-21
Payer: MEDICARE

## 2024-03-21 DIAGNOSIS — J06.9 UPPER RESPIRATORY TRACT INFECTION, UNSPECIFIED TYPE: Primary | ICD-10-CM

## 2024-03-21 PROCEDURE — G8428 CUR MEDS NOT DOCUMENT: HCPCS | Performed by: NURSE PRACTITIONER

## 2024-03-21 PROCEDURE — G8417 CALC BMI ABV UP PARAM F/U: HCPCS | Performed by: NURSE PRACTITIONER

## 2024-03-21 PROCEDURE — 1123F ACP DISCUSS/DSCN MKR DOCD: CPT | Performed by: NURSE PRACTITIONER

## 2024-03-21 PROCEDURE — 1036F TOBACCO NON-USER: CPT | Performed by: NURSE PRACTITIONER

## 2024-03-21 PROCEDURE — G8484 FLU IMMUNIZE NO ADMIN: HCPCS | Performed by: NURSE PRACTITIONER

## 2024-03-21 PROCEDURE — G8399 PT W/DXA RESULTS DOCUMENT: HCPCS | Performed by: NURSE PRACTITIONER

## 2024-03-21 PROCEDURE — 1090F PRES/ABSN URINE INCON ASSESS: CPT | Performed by: NURSE PRACTITIONER

## 2024-03-21 PROCEDURE — 3017F COLORECTAL CA SCREEN DOC REV: CPT | Performed by: NURSE PRACTITIONER

## 2024-03-21 PROCEDURE — 99213 OFFICE O/P EST LOW 20 MIN: CPT | Performed by: NURSE PRACTITIONER

## 2024-03-21 RX ORDER — AMOXICILLIN AND CLAVULANATE POTASSIUM 875; 125 MG/1; MG/1
1 TABLET, FILM COATED ORAL 2 TIMES DAILY
Qty: 14 TABLET | Refills: 0 | Status: SHIPPED | OUTPATIENT
Start: 2024-03-21 | End: 2024-03-28

## 2024-03-21 RX ORDER — GUAIFENESIN 600 MG/1
600 TABLET, EXTENDED RELEASE ORAL 2 TIMES DAILY
Qty: 60 TABLET | Refills: 0 | Status: SHIPPED | COMMUNITY
Start: 2024-03-21

## 2024-03-21 RX ORDER — DEXTROMETHORPHAN POLISTIREX 30 MG/5ML
60 SUSPENSION ORAL 2 TIMES DAILY PRN
Qty: 1 EACH | Refills: 0 | COMMUNITY
Start: 2024-03-21 | End: 2024-03-31

## 2024-03-21 NOTE — PROGRESS NOTES
[facility]  [address]  [address]  [place] [address]  [phone] office/[phone] fax    3/21/2024    HPI:   Dena Oglesby 1955 is a pleasant  /  female who presents virtually.    Todays concern/HPI:  Dry cough last week, turned into wet cough 1 week ago. Bad cough with green phlegm. No fever. A lot of SOB during coughing spells. Allergy pill, nasal spray, cough drops.    About 4 days ago, trouble swallowing solid food. Taking pantoprazole. Not acid, just difficulty swallowing.     Review of Systems - Negative except above      Past Medical History:   Diagnosis Date    Anxiety 4/5/2021    Diabetes ([facility])     Essential hypertension 4/5/2021    Hyperlipidemia 4/5/2021    OAB (overactive bladder) 4/5/2021    Parkinson disease 4/5/2021    Dr Goldberg Cont sinemet as prescribed by neuro    Parkinson's disease 4/5/2021    Primary osteoarthritis of both knees 4/5/2021    Radiculopathy of arm 4/5/2021    RLS (restless legs syndrome) 4/5/2021    Sleep apnea 4/5/2021    Thrombocytopenia, unspecified ([facility]) 1/24/2023    Type 2 diabetes mellitus with hyperglycemia, without long-term current use of insulin ([facility]) 4/5/2021    A1c 5.8-5.9 Microalbumin good     Past Surgical History:   Procedure Laterality Date    HYSTERECTOMY (CERVIX STATUS UNKNOWN)       Current Outpatient Medications   Medication Sig    amoxicillin-clavulanate (AUGMENTIN) 875-125 MG per tablet Take 1 tablet by mouth 2 times daily for 7 days    guaiFENesin (MUCINEX) 600 MG extended release tablet Take 1 tablet by mouth 2 times daily    dextromethorphan (DELSYM) 30 MG/5ML extended release liquid Take 10 mLs by mouth 2 times daily as needed for Cough    fluticasone (FLONASE) 50 MCG/ACT nasal spray 1 spray by Each Nostril route daily    citalopram (CELEXA) 40 MG tablet Take 1 tablet by mouth daily    metFORMIN (GLUCOPHAGE) 500 MG tablet Take 1 tablet by mouth daily (with breakfast)    oxyBUTYnin (DITROPAN

## 2024-03-21 NOTE — ASSESSMENT & PLAN NOTE
Take Mucinex BID with 8oz of water  Delsym for cough  Stay well hydrated  Avoid dairy til cough has subsided (thickens mucous)  Humidifier at night  Sleep with shoulders elevated    Initiate ABX  Discussed possible side effects  Complete ABX therapy to obtain full benefit    I suspect her difficulty swallowing is related to her increased phlegm as this sx didn't start until after she was sick.  Pt to call office for GI referral if sx dont improve.

## 2024-03-26 ENCOUNTER — TELEPHONE (OUTPATIENT)
Age: 69
End: 2024-03-26

## 2024-03-26 NOTE — TELEPHONE ENCOUNTER
Hunterying appt for pt on 4/2   Spoke with pts daughter she wants to keep appt to follow up on cough

## 2024-04-02 PROBLEM — R05.3 CHRONIC COUGH: Status: ACTIVE | Noted: 2024-04-02

## 2024-06-13 RX ORDER — PANTOPRAZOLE SODIUM 20 MG/1
TABLET, DELAYED RELEASE ORAL
Qty: 180 TABLET | Refills: 3 | OUTPATIENT
Start: 2024-06-13

## 2024-07-03 ENCOUNTER — TELEPHONE (OUTPATIENT)
Facility: CLINIC | Age: 69
End: 2024-07-03

## 2024-07-03 NOTE — TELEPHONE ENCOUNTER
Pt called in states she is covid positive   Symptoms onset 7/1/24 tested positive 7/2/24 and wants to know if something can be called in.    Please advise.       Pharmacy   [facility] [address] [place] [address] [address] [place] [address] - P [phone] - F [phone] [[id number]]

## 2024-07-03 NOTE — TELEPHONE ENCOUNTER
Prescription for Paxlovid sent to [facility].  Please let the patient know that she needs to hold atorvastatin during treatment.  Also present to ED for any shortness of breath or worsening symptoms.

## 2024-07-10 DIAGNOSIS — F41.9 ANXIETY: ICD-10-CM

## 2024-07-10 DIAGNOSIS — N32.81 OAB (OVERACTIVE BLADDER): ICD-10-CM

## 2024-07-10 DIAGNOSIS — E11.65 TYPE 2 DIABETES MELLITUS WITH HYPERGLYCEMIA, WITHOUT LONG-TERM CURRENT USE OF INSULIN (HCC): ICD-10-CM

## 2024-07-10 RX ORDER — CITALOPRAM HYDROBROMIDE 40 MG/1
40 TABLET ORAL DAILY
Qty: 90 TABLET | Refills: 3 | OUTPATIENT
Start: 2024-07-10

## 2024-07-10 RX ORDER — OXYBUTYNIN CHLORIDE 10 MG/1
10 TABLET, EXTENDED RELEASE ORAL DAILY
Qty: 90 TABLET | Refills: 3 | OUTPATIENT
Start: 2024-07-10

## 2024-07-21 DIAGNOSIS — E78.5 HYPERLIPIDEMIA, UNSPECIFIED HYPERLIPIDEMIA TYPE: ICD-10-CM

## 2024-07-22 RX ORDER — ATORVASTATIN CALCIUM 20 MG/1
TABLET, FILM COATED ORAL
Qty: 45 TABLET | Refills: 3 | OUTPATIENT
Start: 2024-07-22

## 2024-08-05 ENCOUNTER — TELEPHONE (OUTPATIENT)
Facility: CLINIC | Age: 69
End: 2024-08-05

## 2024-08-05 NOTE — TELEPHONE ENCOUNTER
Patient called in for same day appointment, patient complains of frequency and pain in back when urinating, thinks it is her kidney.  No appointments available today, offered tomorrow and patient stated she needed today and hung up the phone.

## 2024-08-08 LAB
ALBUMIN SERPL-MCNC: 4.2 G/DL (ref 3.9–4.9)
ALBUMIN/CREAT UR: <4 MG/G CREAT (ref 0–29)
ALP SERPL-CCNC: 126 IU/L (ref 44–121)
ALT SERPL-CCNC: 14 IU/L (ref 0–32)
AST SERPL-CCNC: 19 IU/L (ref 0–40)
BILIRUB SERPL-MCNC: 0.4 MG/DL (ref 0–1.2)
BUN SERPL-MCNC: 12 MG/DL (ref 8–27)
BUN/CREAT SERPL: 18 (ref 12–28)
CALCIUM SERPL-MCNC: 9.5 MG/DL (ref 8.7–10.3)
CHLORIDE SERPL-SCNC: 101 MMOL/L (ref 96–106)
CHOLEST SERPL-MCNC: 135 MG/DL (ref 100–199)
CO2 SERPL-SCNC: 26 MMOL/L (ref 20–29)
CREAT SERPL-MCNC: 0.68 MG/DL (ref 0.57–1)
CREAT UR-MCNC: 80.1 MG/DL
EGFRCR SERPLBLD CKD-EPI 2021: 94 ML/MIN/1.73
GLOBULIN SER CALC-MCNC: 2.4 G/DL (ref 1.5–4.5)
GLUCOSE SERPL-MCNC: 98 MG/DL (ref 70–99)
HBA1C MFR BLD: 6.1 % (ref 4.8–5.6)
HDLC SERPL-MCNC: 37 MG/DL
LDLC SERPL CALC-MCNC: 73 MG/DL (ref 0–99)
MICROALBUMIN UR-MCNC: <3 UG/ML
POTASSIUM SERPL-SCNC: 5 MMOL/L (ref 3.5–5.2)
PROT SERPL-MCNC: 6.6 G/DL (ref 6–8.5)
SODIUM SERPL-SCNC: 139 MMOL/L (ref 134–144)
SPECIMEN STATUS REPORT: NORMAL
TRIGL SERPL-MCNC: 142 MG/DL (ref 0–149)
VLDLC SERPL CALC-MCNC: 25 MG/DL (ref 5–40)

## 2024-08-14 ENCOUNTER — OFFICE VISIT (OUTPATIENT)
Facility: CLINIC | Age: 69
End: 2024-08-14
Payer: MEDICARE

## 2024-08-14 VITALS
TEMPERATURE: 96.8 F | HEART RATE: 75 BPM | RESPIRATION RATE: 16 BRPM | SYSTOLIC BLOOD PRESSURE: 132 MMHG | HEIGHT: 61 IN | DIASTOLIC BLOOD PRESSURE: 70 MMHG | OXYGEN SATURATION: 97 % | WEIGHT: 197 LBS | BODY MASS INDEX: 37.19 KG/M2

## 2024-08-14 DIAGNOSIS — Z23 NEED FOR PROPHYLACTIC VACCINATION AND INOCULATION AGAINST VARICELLA: ICD-10-CM

## 2024-08-14 DIAGNOSIS — E11.65 TYPE 2 DIABETES MELLITUS WITH HYPERGLYCEMIA, WITHOUT LONG-TERM CURRENT USE OF INSULIN (HCC): ICD-10-CM

## 2024-08-14 DIAGNOSIS — Z23 NEED FOR PROPHYLACTIC VACCINATION AGAINST DIPHTHERIA-TETANUS-PERTUSSIS (DTP): ICD-10-CM

## 2024-08-14 DIAGNOSIS — L73.9 CHRONIC FOLLICULITIS: ICD-10-CM

## 2024-08-14 DIAGNOSIS — G47.30 SLEEP APNEA, UNSPECIFIED TYPE: ICD-10-CM

## 2024-08-14 DIAGNOSIS — G25.81 RLS (RESTLESS LEGS SYNDROME): ICD-10-CM

## 2024-08-14 DIAGNOSIS — G20.A1 PARKINSON'S DISEASE, UNSPECIFIED WHETHER DYSKINESIA PRESENT, UNSPECIFIED WHETHER MANIFESTATIONS FLUCTUATE (HCC): ICD-10-CM

## 2024-08-14 DIAGNOSIS — Z71.89 ACP (ADVANCE CARE PLANNING): ICD-10-CM

## 2024-08-14 DIAGNOSIS — R05.3 CHRONIC COUGH: ICD-10-CM

## 2024-08-14 DIAGNOSIS — N32.81 OAB (OVERACTIVE BLADDER): ICD-10-CM

## 2024-08-14 DIAGNOSIS — J84.10 PULMONARY FIBROSIS (HCC): ICD-10-CM

## 2024-08-14 DIAGNOSIS — Z00.00 MEDICARE ANNUAL WELLNESS VISIT, SUBSEQUENT: Primary | ICD-10-CM

## 2024-08-14 DIAGNOSIS — M85.80 OSTEOPENIA, UNSPECIFIED LOCATION: ICD-10-CM

## 2024-08-14 DIAGNOSIS — J30.89 ENVIRONMENTAL AND SEASONAL ALLERGIES: ICD-10-CM

## 2024-08-14 DIAGNOSIS — F41.9 ANXIETY: ICD-10-CM

## 2024-08-14 DIAGNOSIS — G89.29 CHRONIC LEFT SHOULDER PAIN: ICD-10-CM

## 2024-08-14 DIAGNOSIS — M25.512 CHRONIC LEFT SHOULDER PAIN: ICD-10-CM

## 2024-08-14 DIAGNOSIS — E78.5 HYPERLIPIDEMIA, UNSPECIFIED HYPERLIPIDEMIA TYPE: ICD-10-CM

## 2024-08-14 DIAGNOSIS — I10 ESSENTIAL HYPERTENSION: ICD-10-CM

## 2024-08-14 PROCEDURE — G0439 PPPS, SUBSEQ VISIT: HCPCS | Performed by: NURSE PRACTITIONER

## 2024-08-14 PROCEDURE — G8399 PT W/DXA RESULTS DOCUMENT: HCPCS | Performed by: NURSE PRACTITIONER

## 2024-08-14 PROCEDURE — 1123F ACP DISCUSS/DSCN MKR DOCD: CPT | Performed by: NURSE PRACTITIONER

## 2024-08-14 PROCEDURE — 99214 OFFICE O/P EST MOD 30 MIN: CPT | Performed by: NURSE PRACTITIONER

## 2024-08-14 PROCEDURE — 99497 ADVNCD CARE PLAN 30 MIN: CPT | Performed by: NURSE PRACTITIONER

## 2024-08-14 PROCEDURE — 3017F COLORECTAL CA SCREEN DOC REV: CPT | Performed by: NURSE PRACTITIONER

## 2024-08-14 PROCEDURE — 3044F HG A1C LEVEL LT 7.0%: CPT | Performed by: NURSE PRACTITIONER

## 2024-08-14 PROCEDURE — 2022F DILAT RTA XM EVC RTNOPTHY: CPT | Performed by: NURSE PRACTITIONER

## 2024-08-14 PROCEDURE — 1090F PRES/ABSN URINE INCON ASSESS: CPT | Performed by: NURSE PRACTITIONER

## 2024-08-14 PROCEDURE — 3078F DIAST BP <80 MM HG: CPT | Performed by: NURSE PRACTITIONER

## 2024-08-14 PROCEDURE — 1036F TOBACCO NON-USER: CPT | Performed by: NURSE PRACTITIONER

## 2024-08-14 PROCEDURE — 3075F SYST BP GE 130 - 139MM HG: CPT | Performed by: NURSE PRACTITIONER

## 2024-08-14 PROCEDURE — G8417 CALC BMI ABV UP PARAM F/U: HCPCS | Performed by: NURSE PRACTITIONER

## 2024-08-14 PROCEDURE — G8428 CUR MEDS NOT DOCUMENT: HCPCS | Performed by: NURSE PRACTITIONER

## 2024-08-14 RX ORDER — RESPIRATORY SYNCYTIAL VISUS VACCINE RECOMBINANT, ADJUVANTED 120MCG/0.5
0.5 KIT INTRAMUSCULAR ONCE
Qty: 0.5 ML | Refills: 0 | Status: SHIPPED | OUTPATIENT
Start: 2024-08-14 | End: 2024-08-14

## 2024-08-14 RX ORDER — ATORVASTATIN CALCIUM 20 MG/1
TABLET, FILM COATED ORAL
Qty: 50 TABLET | Refills: 3 | Status: SHIPPED | OUTPATIENT
Start: 2024-08-14

## 2024-08-14 RX ORDER — OXYBUTYNIN CHLORIDE 10 MG/1
10 TABLET, EXTENDED RELEASE ORAL DAILY
Qty: 95 TABLET | Refills: 1 | Status: SHIPPED | OUTPATIENT
Start: 2024-08-14

## 2024-08-14 RX ORDER — CITALOPRAM 40 MG/1
40 TABLET ORAL DAILY
Qty: 95 TABLET | Refills: 1 | Status: SHIPPED | OUTPATIENT
Start: 2024-08-14

## 2024-08-14 RX ORDER — FLUTICASONE PROPIONATE 50 MCG
1 SPRAY, SUSPENSION (ML) NASAL DAILY
Qty: 32 G | Refills: 1 | Status: SHIPPED | OUTPATIENT
Start: 2024-08-14

## 2024-08-14 SDOH — ECONOMIC STABILITY: FOOD INSECURITY: WITHIN THE PAST 12 MONTHS, YOU WORRIED THAT YOUR FOOD WOULD RUN OUT BEFORE YOU GOT MONEY TO BUY MORE.: NEVER TRUE

## 2024-08-14 SDOH — ECONOMIC STABILITY: FOOD INSECURITY: WITHIN THE PAST 12 MONTHS, THE FOOD YOU BOUGHT JUST DIDN'T LAST AND YOU DIDN'T HAVE MONEY TO GET MORE.: NEVER TRUE

## 2024-08-14 SDOH — ECONOMIC STABILITY: INCOME INSECURITY: HOW HARD IS IT FOR YOU TO PAY FOR THE VERY BASICS LIKE FOOD, HOUSING, MEDICAL CARE, AND HEATING?: NOT HARD AT ALL

## 2024-08-14 ASSESSMENT — PATIENT HEALTH QUESTIONNAIRE - PHQ9
2. FEELING DOWN, DEPRESSED OR HOPELESS: NOT AT ALL
SUM OF ALL RESPONSES TO PHQ QUESTIONS 1-9: 0
SUM OF ALL RESPONSES TO PHQ9 QUESTIONS 1 & 2: 0
1. LITTLE INTEREST OR PLEASURE IN DOING THINGS: NOT AT ALL

## 2024-08-14 ASSESSMENT — LIFESTYLE VARIABLES
HOW MANY STANDARD DRINKS CONTAINING ALCOHOL DO YOU HAVE ON A TYPICAL DAY: PATIENT DOES NOT DRINK
HOW OFTEN DO YOU HAVE A DRINK CONTAINING ALCOHOL: NEVER

## 2024-08-14 NOTE — ACP (ADVANCE CARE PLANNING)
Advance Care Planning     Advance Care Planning (ACP) Physician/NP/PA Conversation    Date of Conversation: 8/14/2024  Conducted with: Patient with Decision Making Capacity    Healthcare Decision Maker:      Primary Decision Maker: Martha Oglesby - Diana - [phone]    Click here to complete Healthcare Decision Makers including selection of the Healthcare Decision Maker Relationship (ie \"Primary\")  Today we documented Decision Maker(s) consistent with Legal Next of Kin hierarchy.    Care Preferences:    Hospitalization:  \"If your health worsens and it becomes clear that your chance of recovery is unlikely, what would be your preference regarding hospitalization?\"  The patient is unsure.    Ventilation:  \"If you were unable to breath on your own and your chance of recovery was unlikely, what would be your preference about the use of a ventilator (breathing machine) if it was available to you?\"  The patient would NOT desire the use of a ventilator.    Resuscitation:  \"In the event your heart stopped as a result of an underlying serious health condition, would you want attempts made to restart your heart, or would you prefer a natural death?\"  Yes, attempt to resuscitate.    ventilation preferences, hospitalization preferences, and resuscitation preferences    Conversation Outcomes / Follow-Up Plan:  ACP in process - information provided, considering goals and options  Reviewed DNR/DNI and patient elects Full Code (Attempt Resuscitation)    Length of Voluntary ACP Conversation in minutes:  16 minutes    Porsche Aquino DNP

## 2024-08-14 NOTE — PATIENT INSTRUCTIONS
Learning About Being Active as an Older Adult  Why is being active important as you get older?     Being active is one of the best things you can do for your health. And it's never too late to start. Being active--or getting active, if you aren't already--has definite benefits. It can:  Give you more energy,  Keep your mind sharp.  Improve balance to reduce your risk of falls.  Help you manage chronic illness with fewer medicines.  No matter how old you are, how fit you are, or what health problems you have, there is a form of activity that will work for you. And the more physical activity you can do, the better your overall health will be.  What kinds of activity can help you stay healthy?  Being more active will make your daily activities easier. Physical activity includes planned exercise and things you do in daily life. There are four types of activity:  Aerobic.  Doing aerobic activity makes your heart and lungs strong.  Includes walking, dancing, and gardening.  Aim for at least 2½ hours spread throughout the week.  It improves your energy and can help you sleep better.  Muscle-strengthening.  This type of activity can help maintain muscle and strengthen bones.  Includes climbing stairs, using resistance bands, and lifting or carrying heavy loads.  Aim for at least twice a week.  It can help protect the knees and other joints.  Stretching.  Stretching gives you better range of motion in joints and muscles.  Includes upper arm stretches, calf stretches, and gentle yoga.  Aim for at least twice a week, preferably after your muscles are warmed up from other activities.  It can help you function better in daily life.  Balancing.  This helps you stay coordinated and have good posture.  Includes heel-to-toe walking, isaias chi, and certain types of yoga.  Aim for at least 3 days a week.  It can reduce your risk of falling.  Even if you have a hard time meeting the recommendations, it's better to be more active

## 2024-08-14 NOTE — PROGRESS NOTES
Dena Oglesby presents today for   Chief Complaint   Patient presents with    Medicare AWV           \"Have you been to the ER, urgent care clinic since your last visit?  Hospitalized since your last visit?\"    NO    “Have you seen or consulted any other health care providers outside of [facility] since your last visit?”    NO           
[facility]  [address]  [address]  [place] [id number]  [phone] office/[phone] fax    8/14/2024    Dena Oglesby 1955 is a pleasant  /  female.       History of Present Illness  The patient presents for an annual wellness visit.    Parkinson's disease. Dx 2014. Cont Sinemet. Dr Goldberg    Hypertension. Dx 2019.  Due to multiple low blood pressure readings her hypertensive medications were discontinued.     Cholesterol. Dx in 2016.  Continues statin therapy daily and tolerating therapy well.      Anxiety. In 2001 she fell and fractured her ankle.  Since this fall she has experienced great anxiety. She was seeing a psychiatrist in [place] who placed her on Prozac 20 mg plus Prozac 40 mg every day which was effective until April 2021. She was placed on Celexa therapy at that time and is doing well.     Sleep apnea. Dx 2010. CPAP.    RLS. Dx 2019.  Dr. Goldberg, neuro    DM.  She continues to take metformin 500 mg daily.     Osteopenia.  She also takes over-the-counter calcium with vitamin D and fish oil twice daily.    Persistent dry cough.  Dr. Mosqueda, pulm Despite allergy testing, no cause has been identified for her cough. Her cough persists, often accompanied by tears and frequent sneezing. She experiences a noise in her ears before a coughing episode.     Nonspecific pulmonary fibrosis.  Prescribed albuterol by pulmonologist.    A CT scan revealed arterial plaque, but she was informed it was not significant.     Sleep apnea.  She uses a new CPAP machine for sleep apnea but reports poor sleep quality.    Anxiety.  She continues to take Celexa     Chronic left shoulder pain.  Continues Voltaren cream, which she finds more effective than diclofenac pills.     Allergies.  She also uses Flonase and Mucinex.    OAB.  Despite taking oxybutynin for overactive bladder, she still experiences urinary frequency and requests a referral to a 
kisha for her chronic cough  Cont flonase  Dr Mosqueda  RLS (restless legs syndrome)  Assessment & Plan:   Monitored by specialist- no acute findings meriting change in the plan   Dr Goldberg  Essential hypertension  Assessment & Plan:   Well-controlled, lifestyle modifications recommended   No longer requiring antihypertensive meds  Recommendations for Preventive Services Due: see orders and patient instructions/AVS.  Recommended screening schedule for the next 5-10 years is provided to the patient in written form: see Patient Instructions/AVS.     Return in about 6 months (around 2/14/2025) for Diabetes, POC A1c.     Subjective     Patient's complete Health Risk Assessment and screening values have been reviewed and are found in Flowsheets. The following problems were reviewed today and where indicated follow up appointments were made and/or referrals ordered.    Positive Risk Factor Screenings with Interventions:                Inactivity:  On average, how many days per week do you engage in moderate to strenuous exercise (like a brisk walk)?: 0 days (!) Abnormal  On average, how many minutes do you engage in exercise at this level?: 0 min  Interventions:  See AVS for additional education material     Abnormal BMI (obese):  Body mass index is 37.22 kg/m². (!) Abnormal  Interventions:  See AVS for additional education material                     Objective   Vitals:    08/14/24 1259   BP: 132/70   Site: Left Upper Arm   Position: Sitting   Cuff Size: Large Adult   Pulse: 75   Resp: 16   Temp: 96.8 °F (36 °C)   TempSrc: Temporal   SpO2: 97%   Weight: 89.4 kg (197 lb)   Height: 1.549 m (5' 1\")      Body mass index is 37.22 kg/m².                  Allergies   Allergen Reactions    Latex Itching and Rash    Sulfa Antibiotics Anaphylaxis     Prior to Visit Medications    Medication Sig Taking? Authorizing Provider   metFORMIN (GLUCOPHAGE) 500 MG tablet Take 1 tablet by mouth daily (with breakfast) Yes Porsche Aquino

## 2024-08-19 PROBLEM — G89.29 CHRONIC LEFT SHOULDER PAIN: Status: ACTIVE | Noted: 2024-02-14

## 2024-08-19 PROBLEM — K21.9 GASTROESOPHAGEAL REFLUX DISEASE WITHOUT ESOPHAGITIS: Status: RESOLVED | Noted: 2023-01-24 | Resolved: 2024-08-19

## 2024-08-19 PROBLEM — E87.5 HYPERKALEMIA: Status: RESOLVED | Noted: 2023-08-24 | Resolved: 2024-08-19

## 2024-08-19 PROBLEM — J06.9 UPPER RESPIRATORY TRACT INFECTION: Status: RESOLVED | Noted: 2023-12-26 | Resolved: 2024-08-19

## 2024-08-19 NOTE — ASSESSMENT & PLAN NOTE
Well-controlled, continue current medications  A1c 5.9-6.1  Recheck level 6 months  Microalbumin WNL  Reduce carbs and sweets in diet  Increase activity to burn calories  Continue metformin 500 mg daily

## 2024-08-19 NOTE — ASSESSMENT & PLAN NOTE
Well-controlled, continue current medications   ; LDL 73  Recheck level 12 months  Cont statin    Reduce fried fatty or oily foods in diet  Limit red meats, processed foods, and alcohol.    Limit fast food  Increase physical activity to 60 minutes of moderate intensity aerobic exercise per week.  Increase water intake  Reduce alcohol intake    How to raise HDL if low:  Consume oats, beans, legumes, olive oil, whole grains, nuts, seeds, fatty fish, and berries.  Lose weight/fat  Reduce alcohol consumption  Smoking cessation

## 2024-08-19 NOTE — ASSESSMENT & PLAN NOTE
Monitored by specialist- no acute findings meriting change in the plan   Dr Goldberg Cont sinemet as prescribed by neuro

## 2024-08-19 NOTE — ASSESSMENT & PLAN NOTE
[facility] Wellness: Reviewed all HM and ordered tests/imaging appropriately. Reviewed care teams and medications with updates.     She is due for her COVID-19 vaccine, which she can receive 90 days after her infection in 07/2024.     She has received her tetanus and shingles vaccines at [facility] within the past year and will request them to forward the records.     Her next mammogram is scheduled for 03/2025.     An eye exam is planned for either 09/2024 or 10/2024.    Reviewed and updated Healthcare Decision maker    Specialists:  Dr Delgado, optometry    Dr Goldberg, neuro Parkinsons  Dr Mosqueda, pulm Pulm Fibrosis, Sleep apnea

## 2024-08-19 NOTE — ASSESSMENT & PLAN NOTE
Uncontrolled, continue current medications  She continues to experience urinary frequency despite taking oxybutynin. A referral to a urogynecologist has been provided for further evaluation and management.

## 2024-08-19 NOTE — ASSESSMENT & PLAN NOTE
Continue vitamin D and calcium supplements  Stay active with weightbearing exercise to include walking and swimming  Avoid falls  Limit alcohol

## 2024-08-19 NOTE — ASSESSMENT & PLAN NOTE
Continue Flonase as needed  Avoid allergens (strong smells, animals, cigarettes or carpeted areas)  Avoid outdoors when pollen level is high.

## 2024-08-19 NOTE — ASSESSMENT & PLAN NOTE
Monitored by specialist- no acute findings meriting change in the plan  Dr Mosqueda, kaylah  CPAP    She is using a new CPAP machine but reports poor sleep quality. She will continue to monitor her symptoms and follow up as needed.

## 2024-08-19 NOTE — ASSESSMENT & PLAN NOTE
Monitored by specialist- no acute findings meriting change in the plan  kaylah Hunt  Not Requiring treatment at this time    5/2024 CT CHEST WO CONTRAST  Order: [id number]  Impression  1. Minimal nonspecific pulmonary fibrosis. No honeycombing or additional evidence of interstitial lung disease.  2. Atherosclerosis including the coronary arteries. If the patient has associated symptoms recommend management per chest pain guidelines. If the patient is asymptomatic consider reviewing modifiable cardiovascular risk factors and managing per primary prevention guidelines.  3. Osseous degenerative changes and additional findings as detailed in the body of the report.

## 2024-08-19 NOTE — ASSESSMENT & PLAN NOTE
Continue diclofenac gel as needed    4/2/2024 OV note:   Xray revealed arthritis  OTC and NSAIDs not effective  Pt seeking Tramadol as this was effective in the past    I have reviewed this patient's controlled substance prescription history through the Prescription Monitoring Program. No inconsistencies noted.

## 2024-09-18 PROBLEM — Z00.00 MEDICARE ANNUAL WELLNESS VISIT, SUBSEQUENT: Status: RESOLVED | Noted: 2023-08-24 | Resolved: 2024-09-18

## 2024-11-06 ENCOUNTER — OFFICE VISIT (OUTPATIENT)
Facility: CLINIC | Age: 69
End: 2024-11-06

## 2024-11-06 VITALS
HEART RATE: 64 BPM | RESPIRATION RATE: 16 BRPM | SYSTOLIC BLOOD PRESSURE: 119 MMHG | OXYGEN SATURATION: 97 % | DIASTOLIC BLOOD PRESSURE: 62 MMHG | WEIGHT: 200 LBS | HEIGHT: 61 IN | BODY MASS INDEX: 37.76 KG/M2 | TEMPERATURE: 98.1 F

## 2024-11-06 DIAGNOSIS — R42 VERTIGO: Primary | ICD-10-CM

## 2024-11-06 DIAGNOSIS — G20.A1 PARKINSON'S DISEASE, UNSPECIFIED WHETHER DYSKINESIA PRESENT, UNSPECIFIED WHETHER MANIFESTATIONS FLUCTUATE (HCC): ICD-10-CM

## 2024-11-06 RX ORDER — MECLIZINE HYDROCHLORIDE 25 MG/1
TABLET ORAL
Qty: 30 TABLET | Refills: 0 | Status: SHIPPED | OUTPATIENT
Start: 2024-11-06

## 2024-11-06 NOTE — PROGRESS NOTES
Dena Oglesby is a 68 y.o. female (: 1955) presenting to address:    Chief Complaint   Patient presents with    Dizziness     X 1 week        Vitals:    24 1018   BP: 119/62   Pulse: 64   Resp: 16   Temp: 98.1 °F (36.7 °C)   SpO2: 97%       \"Have you been to the ER, urgent care clinic since your last visit?  Hospitalized since your last visit?\"    NO    “Have you seen or consulted any other health care providers outside of [facility] since your last visit?”    NO

## 2024-11-06 NOTE — ASSESSMENT & PLAN NOTE
Monitored by specialist- no acute findings meriting change in the plan   Dr Goldberg Cont sinemet as prescribed by neuro  She reports increased anxiety at night and at times when driving.  It was noted that her anxiety could be exacerbated by her Parkinson's disease. No new medication was prescribed for anxiety at this visit.

## 2024-11-06 NOTE — ASSESSMENT & PLAN NOTE
New development over the past week  Symptoms suggest a diagnosis of vertigo, likely triggered by weather changes and possibly related to an inner ear issue. She was advised to maintain adequate hydration. A prescription for meclizine 25 mg was provided, with instructions to take one tablet at night before bed. If dizziness occurs during the day, she can take half a tablet, but should be aware of potential drowsiness. She was also advised to sit for a moment before standing up and to stand for a moment before walking.

## 2024-11-06 NOTE — PROGRESS NOTES
[facility] [address]  [address]  [place] [address]  [phone] office/[phone] fax    11/6/2024    Dena Oglesby 1955 is a pleasant  /  female.       History of Present Illness  The patient presents for evaluation of dizziness.    She reports experiencing dizziness, which began after a visit to her urologist, Dr. Pruitt, for a bladder issue. She believes the dizziness started after she began using Premarin cream, which was prescribed for daily use. The dizziness occurs when she moves in bed or stands up in the morning, sometimes causing her to fall back into bed.  She experiences these brief dizzy spells when turning her head briskly from left to right and upon standing at times.  These episodes are brief and resolve quickly. She reports no associated nausea or vomiting.  Her mother has history of vertigo.    She is scheduled for bladder surgery on 12/03/2024. She has discontinued the use of oxybutynin as per her urologists advice.     For her allergies, she continues to use her nasal spray and has stopped taking Singulair for allergies per instruction of the ENT.     She reports no shortness of breath, chest pain, or swelling in her feet.    Additionally, she mentions experiencing anxiety at night and increased shaking due to her Parkinson's disease.    She sees Dr. Goldberg for her Parkinson's and Dr. Mosqueda for her pulmonary fibrosis and sleep apnea.    FAMILY HISTORY  Her mother has a history of vertigo.      Physical Exam      Physical Exam  Constitutional:       Appearance: Normal appearance. She is obese.   Eyes:      Extraocular Movements: Extraocular movements intact.      Conjunctiva/sclera: Conjunctivae normal.   Cardiovascular:      Rate and Rhythm: Normal rate and regular rhythm.      Heart sounds: Normal heart sounds.   Pulmonary:      Effort: Pulmonary effort is normal.      Breath sounds: Normal breath sounds.   Musculoskeletal:

## 2025-01-08 DIAGNOSIS — J30.89 ENVIRONMENTAL AND SEASONAL ALLERGIES: ICD-10-CM

## 2025-01-08 DIAGNOSIS — F41.9 ANXIETY: ICD-10-CM

## 2025-01-08 DIAGNOSIS — E11.65 TYPE 2 DIABETES MELLITUS WITH HYPERGLYCEMIA, WITHOUT LONG-TERM CURRENT USE OF INSULIN (HCC): ICD-10-CM

## 2025-01-08 RX ORDER — FLUTICASONE PROPIONATE 50 MCG
SPRAY, SUSPENSION (ML) NASAL
Qty: 32 G | Refills: 0 | Status: SHIPPED | OUTPATIENT
Start: 2025-01-08

## 2025-01-08 RX ORDER — CITALOPRAM HYDROBROMIDE 40 MG/1
40 TABLET ORAL DAILY
Qty: 90 TABLET | Refills: 0 | Status: SHIPPED | OUTPATIENT
Start: 2025-01-08

## 2025-01-08 NOTE — TELEPHONE ENCOUNTER
Last OV: 11/6/2024  Next OV: 2/17/2025  Last refill: 8/14/2024    Please refuse if medications will be filled at next appointment.

## 2025-02-18 ENCOUNTER — TELEPHONE (OUTPATIENT)
Facility: CLINIC | Age: 70
End: 2025-02-18

## 2025-02-18 NOTE — TELEPHONE ENCOUNTER
LVM to inform pt's daughter who is on PHI that pt will need a POC A1c at the time of her appt.    If appt is switched to VV labs will need to be completed prior.

## 2025-02-18 NOTE — TELEPHONE ENCOUNTER
Patients daughter contacted the office asking if appt scheduled for 03/26/2025 can be a vv, please advise

## 2025-03-23 DIAGNOSIS — J30.89 ENVIRONMENTAL AND SEASONAL ALLERGIES: ICD-10-CM

## 2025-03-23 DIAGNOSIS — F41.9 ANXIETY: ICD-10-CM

## 2025-03-23 DIAGNOSIS — E11.65 TYPE 2 DIABETES MELLITUS WITH HYPERGLYCEMIA, WITHOUT LONG-TERM CURRENT USE OF INSULIN (HCC): ICD-10-CM

## 2025-03-25 RX ORDER — CITALOPRAM HYDROBROMIDE 40 MG/1
40 TABLET ORAL DAILY
Qty: 90 TABLET | Refills: 0 | Status: SHIPPED | OUTPATIENT
Start: 2025-03-25

## 2025-03-25 RX ORDER — FLUTICASONE PROPIONATE 50 MCG
SPRAY, SUSPENSION (ML) NASAL
Qty: 32 G | Refills: 0 | Status: SHIPPED | OUTPATIENT
Start: 2025-03-25

## 2025-03-31 ENCOUNTER — HOSPITAL ENCOUNTER (OUTPATIENT)
Facility: HOSPITAL | Age: 70
Discharge: HOME | End: 2025-04-03
Payer: MEDICARE

## 2025-03-31 ENCOUNTER — OFFICE VISIT (OUTPATIENT)
Facility: CLINIC | Age: 70
End: 2025-03-31
Payer: MEDICARE

## 2025-03-31 VITALS
TEMPERATURE: 98.3 F | HEART RATE: 71 BPM | SYSTOLIC BLOOD PRESSURE: 142 MMHG | RESPIRATION RATE: 18 BRPM | BODY MASS INDEX: 38.89 KG/M2 | DIASTOLIC BLOOD PRESSURE: 76 MMHG | WEIGHT: 206 LBS | OXYGEN SATURATION: 96 % | HEIGHT: 61 IN

## 2025-03-31 DIAGNOSIS — R29.898 LEG HEAVINESS: ICD-10-CM

## 2025-03-31 DIAGNOSIS — M25.551 RIGHT HIP PAIN: ICD-10-CM

## 2025-03-31 DIAGNOSIS — M25.551 RIGHT HIP PAIN: Primary | ICD-10-CM

## 2025-03-31 PROCEDURE — 3077F SYST BP >= 140 MM HG: CPT | Performed by: NURSE PRACTITIONER

## 2025-03-31 PROCEDURE — 3078F DIAST BP <80 MM HG: CPT | Performed by: NURSE PRACTITIONER

## 2025-03-31 PROCEDURE — 1036F TOBACCO NON-USER: CPT | Performed by: NURSE PRACTITIONER

## 2025-03-31 PROCEDURE — 1123F ACP DISCUSS/DSCN MKR DOCD: CPT | Performed by: NURSE PRACTITIONER

## 2025-03-31 PROCEDURE — G8399 PT W/DXA RESULTS DOCUMENT: HCPCS | Performed by: NURSE PRACTITIONER

## 2025-03-31 PROCEDURE — 1090F PRES/ABSN URINE INCON ASSESS: CPT | Performed by: NURSE PRACTITIONER

## 2025-03-31 PROCEDURE — G8417 CALC BMI ABV UP PARAM F/U: HCPCS | Performed by: NURSE PRACTITIONER

## 2025-03-31 PROCEDURE — 72100 X-RAY EXAM L-S SPINE 2/3 VWS: CPT

## 2025-03-31 PROCEDURE — G8427 DOCREV CUR MEDS BY ELIG CLIN: HCPCS | Performed by: NURSE PRACTITIONER

## 2025-03-31 PROCEDURE — 3017F COLORECTAL CA SCREEN DOC REV: CPT | Performed by: NURSE PRACTITIONER

## 2025-03-31 PROCEDURE — 1125F AMNT PAIN NOTED PAIN PRSNT: CPT | Performed by: NURSE PRACTITIONER

## 2025-03-31 PROCEDURE — 99214 OFFICE O/P EST MOD 30 MIN: CPT | Performed by: NURSE PRACTITIONER

## 2025-03-31 PROCEDURE — 1159F MED LIST DOCD IN RCRD: CPT | Performed by: NURSE PRACTITIONER

## 2025-03-31 RX ORDER — TRAMADOL HYDROCHLORIDE 50 MG/1
50 TABLET ORAL EVERY 8 HOURS PRN
Qty: 15 TABLET | Refills: 0 | Status: SHIPPED | OUTPATIENT
Start: 2025-03-31 | End: 2025-04-05

## 2025-03-31 SDOH — ECONOMIC STABILITY: FOOD INSECURITY: WITHIN THE PAST 12 MONTHS, THE FOOD YOU BOUGHT JUST DIDN'T LAST AND YOU DIDN'T HAVE MONEY TO GET MORE.: NEVER TRUE

## 2025-03-31 SDOH — ECONOMIC STABILITY: FOOD INSECURITY: WITHIN THE PAST 12 MONTHS, YOU WORRIED THAT YOUR FOOD WOULD RUN OUT BEFORE YOU GOT MONEY TO BUY MORE.: NEVER TRUE

## 2025-03-31 ASSESSMENT — PATIENT HEALTH QUESTIONNAIRE - PHQ9
SUM OF ALL RESPONSES TO PHQ QUESTIONS 1-9: 0
SUM OF ALL RESPONSES TO PHQ QUESTIONS 1-9: 0
1. LITTLE INTEREST OR PLEASURE IN DOING THINGS: NOT AT ALL
2. FEELING DOWN, DEPRESSED OR HOPELESS: NOT AT ALL
SUM OF ALL RESPONSES TO PHQ QUESTIONS 1-9: 0
SUM OF ALL RESPONSES TO PHQ QUESTIONS 1-9: 0

## 2025-03-31 NOTE — ASSESSMENT & PLAN NOTE
- Pain radiates from the ankle up to the knee and buttock, described as stabbing and pulsating.  - Physical examination reveals swelling in the right ankle up to thigh.  - X-ray of the lumbar region ordered to rule out spinal abnormalities.  - Prescription for tramadol to be taken every 8 hours with Motrin in between provided.

## 2025-03-31 NOTE — PROGRESS NOTES
[facility] [address]  [address]  [place] [id number]  [phone] office/[phone] fax    3/31/2025    Dena Oglesby 1955 is a pleasant  /  female.       History of Present Illness  The patient presents for evaluation of right leg pain and swelling.    The chief complaint is pain in the right hip, radiating down to the ankle. The onset of pain was noted while driving. Described as stabbing in nature, the pain is likened to the sensation of an ingrown toenail, throbbing type pain, and rated as a 5 on a scale of 0 to 10. The pain is localized on the lateral aspect of the leg, extending from the ankle to the buttock. Recent significant lower back pain is reported, similar to the discomfort experienced post-lumbar surgery. Prolonged standing exacerbates the lower back pain. No radiographic examination of the back has been performed. Pain management has included ibuprofen 800 mg every 6 hours, but continued use is advised against due to associated heartburn and acid reflux. Alternative pain management options, including injections, are sought. No recent falls have occurred. Knee surgery was performed approximately 2 years ago, with a scheduled follow-up appointment with Dr. Disla on 04/09/2025. Lumbar surgery was performed in [place].    Swelling in the right leg was first noticed 2 days ago. The swelling is slightly reduced upon waking in the morning but does not completely resolve. No heat or pain is experienced in the calf area. A normal vein test was conducted in 2021, and no current care from a vascular specialist is reported.    A broken molar tooth is present, and the patient is unable to see the dentist for 3 days, during which ibuprofen and Tylenol every 6 hours were advised for pain management. An upcoming cataract surgery is scheduled for 04/23/2025.    PAST SURGICAL HISTORY:  - Knee surgery approximately 2 years ago  - Lumbar surgery in

## 2025-03-31 NOTE — PROGRESS NOTES
Dena Oglesby is a 69 y.o. female (: 1955) presenting to address:    Chief Complaint   Patient presents with    Hip Pain     Right hip pain that radiates down to the leg x 1 month        Vitals:    25 1327   BP: (!) 142/76   Pulse:    Resp:    Temp:    SpO2:        \"Have you been to the ER, urgent care clinic since your last visit?  Hospitalized since your last visit?\"    NO    “Have you seen or consulted any other health care providers outside of [facility] since your last visit?”    NO       Have you had a mammogram?”   NO    Date of last Mammogram: 3/15/2024

## 2025-03-31 NOTE — ASSESSMENT & PLAN NOTE
- Swelling in the right leg first noticed 2 days ago, not completely resolving overnight.  - Physical examination confirms swelling in the right ankle up to thigh.  - Duplex lower extremity venous bilateral test ordered to exclude the possibility of blood clots.  - Patient instructed to schedule the vascular study.    Swelling may be related to Rt knee-postop 2 yrs ago. Pt is seeing Dr Disla, ortho surgeon, next week.

## 2025-04-07 ENCOUNTER — OFFICE VISIT (OUTPATIENT)
Facility: CLINIC | Age: 70
End: 2025-04-07
Payer: MEDICARE

## 2025-04-07 VITALS
OXYGEN SATURATION: 98 % | DIASTOLIC BLOOD PRESSURE: 78 MMHG | SYSTOLIC BLOOD PRESSURE: 136 MMHG | RESPIRATION RATE: 16 BRPM | WEIGHT: 203 LBS | BODY MASS INDEX: 38.33 KG/M2 | HEIGHT: 61 IN | TEMPERATURE: 98.3 F | HEART RATE: 74 BPM

## 2025-04-07 DIAGNOSIS — M25.551 RIGHT HIP PAIN: ICD-10-CM

## 2025-04-07 DIAGNOSIS — E66.09 CLASS 2 OBESITY DUE TO EXCESS CALORIES WITHOUT SERIOUS COMORBIDITY WITH BODY MASS INDEX (BMI) OF 38.0 TO 38.9 IN ADULT: ICD-10-CM

## 2025-04-07 DIAGNOSIS — F41.9 ANXIETY: ICD-10-CM

## 2025-04-07 DIAGNOSIS — E66.812 CLASS 2 OBESITY DUE TO EXCESS CALORIES WITHOUT SERIOUS COMORBIDITY WITH BODY MASS INDEX (BMI) OF 38.0 TO 38.9 IN ADULT: ICD-10-CM

## 2025-04-07 DIAGNOSIS — M25.561 CHRONIC PAIN OF RIGHT KNEE: ICD-10-CM

## 2025-04-07 DIAGNOSIS — Z01.818 PREOPERATIVE CLEARANCE: ICD-10-CM

## 2025-04-07 DIAGNOSIS — J84.10 PULMONARY FIBROSIS (HCC): ICD-10-CM

## 2025-04-07 DIAGNOSIS — G25.81 RLS (RESTLESS LEGS SYNDROME): ICD-10-CM

## 2025-04-07 DIAGNOSIS — G20.A1 PARKINSON'S DISEASE, UNSPECIFIED WHETHER DYSKINESIA PRESENT, UNSPECIFIED WHETHER MANIFESTATIONS FLUCTUATE (HCC): ICD-10-CM

## 2025-04-07 DIAGNOSIS — J30.89 ENVIRONMENTAL AND SEASONAL ALLERGIES: ICD-10-CM

## 2025-04-07 DIAGNOSIS — I51.89 GRADE I DIASTOLIC DYSFUNCTION: ICD-10-CM

## 2025-04-07 DIAGNOSIS — E11.65 TYPE 2 DIABETES MELLITUS WITH HYPERGLYCEMIA, WITHOUT LONG-TERM CURRENT USE OF INSULIN (HCC): Primary | ICD-10-CM

## 2025-04-07 DIAGNOSIS — G89.29 CHRONIC PAIN OF RIGHT KNEE: ICD-10-CM

## 2025-04-07 DIAGNOSIS — H25.093 AGE-RELATED INCIPIENT CATARACT OF BOTH EYES: ICD-10-CM

## 2025-04-07 DIAGNOSIS — I25.10 CORONARY ARTERY DISEASE INVOLVING NATIVE CORONARY ARTERY OF NATIVE HEART WITHOUT ANGINA PECTORIS: ICD-10-CM

## 2025-04-07 LAB — HBA1C MFR BLD: 5.8 %

## 2025-04-07 PROCEDURE — 1125F AMNT PAIN NOTED PAIN PRSNT: CPT | Performed by: NURSE PRACTITIONER

## 2025-04-07 PROCEDURE — 1159F MED LIST DOCD IN RCRD: CPT | Performed by: NURSE PRACTITIONER

## 2025-04-07 PROCEDURE — 83036 HEMOGLOBIN GLYCOSYLATED A1C: CPT | Performed by: NURSE PRACTITIONER

## 2025-04-07 PROCEDURE — 3078F DIAST BP <80 MM HG: CPT | Performed by: NURSE PRACTITIONER

## 2025-04-07 PROCEDURE — G8399 PT W/DXA RESULTS DOCUMENT: HCPCS | Performed by: NURSE PRACTITIONER

## 2025-04-07 PROCEDURE — G8417 CALC BMI ABV UP PARAM F/U: HCPCS | Performed by: NURSE PRACTITIONER

## 2025-04-07 PROCEDURE — 3044F HG A1C LEVEL LT 7.0%: CPT | Performed by: NURSE PRACTITIONER

## 2025-04-07 PROCEDURE — 2022F DILAT RTA XM EVC RTNOPTHY: CPT | Performed by: NURSE PRACTITIONER

## 2025-04-07 PROCEDURE — 3046F HEMOGLOBIN A1C LEVEL >9.0%: CPT | Performed by: NURSE PRACTITIONER

## 2025-04-07 PROCEDURE — 3017F COLORECTAL CA SCREEN DOC REV: CPT | Performed by: NURSE PRACTITIONER

## 2025-04-07 PROCEDURE — 1036F TOBACCO NON-USER: CPT | Performed by: NURSE PRACTITIONER

## 2025-04-07 PROCEDURE — 99214 OFFICE O/P EST MOD 30 MIN: CPT | Performed by: NURSE PRACTITIONER

## 2025-04-07 PROCEDURE — G8427 DOCREV CUR MEDS BY ELIG CLIN: HCPCS | Performed by: NURSE PRACTITIONER

## 2025-04-07 PROCEDURE — 3075F SYST BP GE 130 - 139MM HG: CPT | Performed by: NURSE PRACTITIONER

## 2025-04-07 PROCEDURE — 1090F PRES/ABSN URINE INCON ASSESS: CPT | Performed by: NURSE PRACTITIONER

## 2025-04-07 PROCEDURE — 1123F ACP DISCUSS/DSCN MKR DOCD: CPT | Performed by: NURSE PRACTITIONER

## 2025-04-07 RX ORDER — AZELASTINE 1 MG/ML
1 SPRAY, METERED NASAL 2 TIMES DAILY
COMMUNITY

## 2025-04-07 RX ORDER — CITALOPRAM HYDROBROMIDE 40 MG/1
40 TABLET ORAL DAILY
Qty: 90 TABLET | Refills: 1 | Status: SHIPPED | OUTPATIENT
Start: 2025-04-07

## 2025-04-07 RX ORDER — PRAMIPEXOLE DIHYDROCHLORIDE 1.5 MG/1
1.5 TABLET ORAL 3 TIMES DAILY
COMMUNITY

## 2025-04-07 NOTE — ASSESSMENT & PLAN NOTE
Echocardiogram in 11/2024 showed normal left ventricular systolic function and an EF of 55%.  Mild left ventricular hypertrophy and grade 1 diastolic dysfunction noted, which are common findings.    An ultrasound and other tests at [facility] approximately 6 months ago due to a suspected clogged vein yielded normal results.

## 2025-04-07 NOTE — ASSESSMENT & PLAN NOTE
Her A1c level has decreased from 6.1 to 5.8, indicating improved glycemic control.  Continues on metformin 500 mg daily with good effect.  Discussed current regimen and provided a prescription refill for 90 tablets plus a refill, ensuring a supply for the next 6 months.  Recheck A1c 4 months    8/19/2024:   Well-controlled, continue current medications  A1c 5.9-6.1  Recheck level 6 months  Microalbumin WNL  Reduce carbs and sweets in diet  Increase activity to burn calories  Continue metformin 500 mg daily

## 2025-04-07 NOTE — ASSESSMENT & PLAN NOTE
Currently using Astelin nasal spray twice daily as prescribed by Dr. Sandoval.  Flonase was discontinued by Jaime    8/19/2024:  Continue Flonase as needed  Avoid allergens (strong smells, animals, cigarettes or carpeted areas)  Avoid outdoors when pollen level is high.

## 2025-04-07 NOTE — ASSESSMENT & PLAN NOTE
Dr Blanchard, [facility]  Procedure: Cataract extraction with intraocular lens implantation  Anesthesia: Topical with monitored anesthesia care  Date of procedure: 4/23/2025    Pt is cleared for surgery

## 2025-04-07 NOTE — ASSESSMENT & PLAN NOTE
Echocardiogram in 11/2024 showed normal left ventricular systolic function and an EF of 55%.  Mild left ventricular hypertrophy and grade 1 diastolic dysfunction noted, which are common findings.

## 2025-04-07 NOTE — ASSESSMENT & PLAN NOTE
Expressed interest in using Ozempic for weight loss; insurance coverage not applicable due to stable diabetes.  Informed about [facility] and [facility] for potential weight management strategies.  Referral to [facility] has been made.

## 2025-04-07 NOTE — ASSESSMENT & PLAN NOTE
Continues to see Dr. Goldberg and is on carbidopa-levodopa and Mirapex (pramipexole) 1.5 mg three times a day for Parkinson's disease and restless leg syndrome.    11/6/2024:   Monitored by specialist- no acute findings meriting change in the plan   Dr Goldberg  Cont sinemet as prescribed by neuro  She reports increased anxiety at night and at times when driving.  It was noted that her anxiety could be exacerbated by her Parkinson's disease. No new medication was prescribed for anxiety at this visit.

## 2025-04-07 NOTE — ASSESSMENT & PLAN NOTE
Dr Blanchard, [facility]  Procedure: Cataract extraction with intraocular lens implantation  Anesthesia: Topical with monitored anesthesia care  Date of procedure: 4/23/2025     Pt is cleared for surgery

## 2025-04-07 NOTE — ASSESSMENT & PLAN NOTE
3/2025: Lumbar x-ray IMPRESSION:   No acute fracture or subluxation. Unremarkable fusion hardware.    Now patient believes her pain is no longer in her back and hip, she believes it is in her right knee.  She has a follow-up appointment with Dr. Disla on Wednesday for further evaluation.  Patient had right knee surgery a few years ago.    3/31/2025:  - Pain radiates from the ankle up to the knee and buttock, described as stabbing and pulsating.  - Physical examination reveals swelling in the right ankle up to thigh.  - X-ray of the lumbar region ordered to rule out spinal abnormalities.  - Prescription for tramadol to be taken every 8 hours with Motrin in between provided.

## 2025-04-07 NOTE — PROGRESS NOTES
Dena Oglesby is a 69 y.o. female (: 1955) presenting to address:    Chief Complaint   Patient presents with    6 Month Follow-Up       Vitals:    25 1346   BP: 136/78   Pulse:    Resp:    Temp:    SpO2:        \"Have you been to the ER, urgent care clinic since your last visit?  Hospitalized since your last visit?\"    NO    “Have you seen or consulted any other health care providers outside of [facility] since your last visit?”    NO       Have you had a mammogram?”   NO    Date of last Mammogram: 3/15/2024           
PREVNAR 13, (age 6w+), IM, 0.5mL 08/23/2023    Pneumococcal, PPSV23, PNEUMOVAX 23, (age 2y+), SC/IM, 0.5mL 04/02/2021    TDaP, ADACEL (age 10y-64y), BOOSTRIX (age 10y+), IM, 0.5mL 08/12/2023    Zoster Live (Zostavax) 08/12/2023         Return in about 4 months (around 8/7/2025) for AWV, Lab fasting (CMP, A1c, microalb, Lipid).      Dr. Porsche Aquino, ABEP-C, DNP  [facility]     The patient (or guardian, if applicable) and other individuals in attendance with the patient were advised that Artificial Intelligence will be utilized during this visit to record and process the conversation to generate a clinical note. The patient (or guardian, if applicable) and other individuals in attendance at the appointment consented to the use of AI, including the recording.

## 2025-04-07 NOTE — ASSESSMENT & PLAN NOTE
Experiences shortness of breath when climbing stairs, likely related to pulmonary fibrosis.    8/19/2024:   Monitored by specialist- no acute findings meriting change in the plan  kaylah Hunt  Not Requiring treatment at this time    5/2024 CT CHEST WO CONTRAST  Order: [id number]  Impression  1. Minimal nonspecific pulmonary fibrosis. No honeycombing or additional evidence of interstitial lung disease.  2. Atherosclerosis including the coronary arteries. If the patient has associated symptoms recommend management per chest pain guidelines. If the patient is asymptomatic consider reviewing modifiable cardiovascular risk factors and managing per primary prevention guidelines.  3. Osseous degenerative changes and additional findings as detailed in the body of the report.

## 2025-04-07 NOTE — ASSESSMENT & PLAN NOTE
Reports Celexa 40 mg is effective, though occasional anxiety spikes are manageable.  Refilled Celexa 40 mg, providing an additional 90 tablets to ensure a 6-month supply.    8/19/2024:   Well-controlled, continue current medications  Continue Celexa therapy

## 2025-04-16 ENCOUNTER — HOSPITAL ENCOUNTER (OUTPATIENT)
Facility: HOSPITAL | Age: 70
Discharge: HOME | End: 2025-04-18
Payer: MEDICARE

## 2025-04-16 DIAGNOSIS — R29.898 LEG HEAVINESS: ICD-10-CM

## 2025-04-16 PROCEDURE — 93970 EXTREMITY STUDY: CPT

## 2025-04-17 ENCOUNTER — RESULTS FOLLOW-UP (OUTPATIENT)
Facility: CLINIC | Age: 70
End: 2025-04-17

## 2025-04-17 NOTE — TELEPHONE ENCOUNTER
----- Message from Porsche Aquino DNP sent at 4/17/2025  7:57 AM EDT -----  Ilir, please inform pt her duplex was negative for DVT.     ·  No evidence of deep vein or superficial vein thrombosis in the right lower extremity. Vessels demonstrate normal compressibility, color filling, and phasic and spontaneous flow.  ·  No evidence of deep vein or superficial vein thrombosis in the left lower extremity. Vessels demonstrate normal compressibility, color filling, and phasic and spontaneous flow.    Follow up with ortho. Thank you

## 2025-04-17 NOTE — RESULT ENCOUNTER NOTE
Ilir, please inform pt her duplex was negative for DVT.     ·  No evidence of deep vein or superficial vein thrombosis in the right lower extremity. Vessels demonstrate normal compressibility, color filling, and phasic and spontaneous flow.  ·  No evidence of deep vein or superficial vein thrombosis in the left lower extremity. Vessels demonstrate normal compressibility, color filling, and phasic and spontaneous flow.    Follow up with ortho. Thank you

## 2025-04-23 ENCOUNTER — OFFICE VISIT (OUTPATIENT)
Facility: CLINIC | Age: 70
End: 2025-04-23
Payer: MEDICARE

## 2025-04-23 VITALS
DIASTOLIC BLOOD PRESSURE: 73 MMHG | TEMPERATURE: 97.7 F | HEIGHT: 61 IN | SYSTOLIC BLOOD PRESSURE: 115 MMHG | RESPIRATION RATE: 16 BRPM | HEART RATE: 71 BPM | WEIGHT: 202 LBS | BODY MASS INDEX: 38.14 KG/M2 | OXYGEN SATURATION: 98 %

## 2025-04-23 DIAGNOSIS — R05.1 ACUTE COUGH: ICD-10-CM

## 2025-04-23 DIAGNOSIS — J06.9 UPPER RESPIRATORY TRACT INFECTION, UNSPECIFIED TYPE: Primary | ICD-10-CM

## 2025-04-23 DIAGNOSIS — R09.82 POST-NASAL DRIP: ICD-10-CM

## 2025-04-23 PROCEDURE — 1123F ACP DISCUSS/DSCN MKR DOCD: CPT | Performed by: NURSE PRACTITIONER

## 2025-04-23 PROCEDURE — 1159F MED LIST DOCD IN RCRD: CPT | Performed by: NURSE PRACTITIONER

## 2025-04-23 PROCEDURE — 99214 OFFICE O/P EST MOD 30 MIN: CPT | Performed by: NURSE PRACTITIONER

## 2025-04-23 PROCEDURE — 1036F TOBACCO NON-USER: CPT | Performed by: NURSE PRACTITIONER

## 2025-04-23 PROCEDURE — 1090F PRES/ABSN URINE INCON ASSESS: CPT | Performed by: NURSE PRACTITIONER

## 2025-04-23 PROCEDURE — 3078F DIAST BP <80 MM HG: CPT | Performed by: NURSE PRACTITIONER

## 2025-04-23 PROCEDURE — G8417 CALC BMI ABV UP PARAM F/U: HCPCS | Performed by: NURSE PRACTITIONER

## 2025-04-23 PROCEDURE — G8427 DOCREV CUR MEDS BY ELIG CLIN: HCPCS | Performed by: NURSE PRACTITIONER

## 2025-04-23 PROCEDURE — 3017F COLORECTAL CA SCREEN DOC REV: CPT | Performed by: NURSE PRACTITIONER

## 2025-04-23 PROCEDURE — 3074F SYST BP LT 130 MM HG: CPT | Performed by: NURSE PRACTITIONER

## 2025-04-23 PROCEDURE — G8399 PT W/DXA RESULTS DOCUMENT: HCPCS | Performed by: NURSE PRACTITIONER

## 2025-04-23 PROCEDURE — 1125F AMNT PAIN NOTED PAIN PRSNT: CPT | Performed by: NURSE PRACTITIONER

## 2025-04-23 RX ORDER — AMOXICILLIN 500 MG/1
500 CAPSULE ORAL 2 TIMES DAILY
Qty: 14 CAPSULE | Refills: 0 | Status: SHIPPED | OUTPATIENT
Start: 2025-04-23 | End: 2025-04-30

## 2025-04-23 RX ORDER — DEXTROMETHORPHAN POLISTIREX 30 MG/5ML
60 SUSPENSION ORAL 2 TIMES DAILY PRN
COMMUNITY
Start: 2025-04-23 | End: 2025-05-03

## 2025-04-23 NOTE — PROGRESS NOTES
Dena Oglesby is a 69 y.o. female (: 1955) presenting to address:    Chief Complaint   Patient presents with    Cough     X 2 weeks pt has tried OTC medication;Robitussin DM,Mucinex,  Delsym    Nasal Congestion     X 2 weeks     Fatigue     X 2 weeks        Vitals:    25 1413   BP: 115/73   Pulse: 71   Resp: 16   Temp: 97.7 °F (36.5 °C)   SpO2: 98%       \"Have you been to the ER, urgent care clinic since your last visit?  Hospitalized since your last visit?\"    NO    “Have you seen or consulted any other health care providers outside of [facility] since your last visit?”    NO       Have you had a mammogram?”   NO    Date of last Mammogram: 3/15/2024

## 2025-04-23 NOTE — PROGRESS NOTES
[facility] [address]  [address]  [place] [id number]  [phone] office/[phone] fax    4/23/2025    Dena Oglesby 1955 is a pleasant  /  female.       History of Present Illness  The patient presents for evaluation of cough and congestion.    A persistent cough and nasal congestion have been experienced for the past 2 weeks, with symptoms progressively worsening. The cough is productive, yielding green phlegm, and is consistent throughout the day and night. Despite not feeling fatigued, her daughter reports that she appears tired. No fever, chest pain, shortness of breath, ear pain, dizziness, or falls are reported. There is also no facial pressure. Saline nasal spray has been used as recommended by another physician, and a lemon with warm water is consumed each morning with no improvement.  Attempts to alleviate symptoms with Robitussin, Mucinex, and Delsym have not been effective.    Cataract surgery was scheduled for today but had to be canceled due to the cough. The rescheduling of the cataract surgery will be determined based on the resolution of current symptoms.      Physical Exam      Physical Exam  Constitutional:       Appearance: Normal appearance. She is obese.   HENT:      Head: Normocephalic and atraumatic.      Right Ear: Tympanic membrane, ear canal and external ear normal.      Left Ear: Tympanic membrane, ear canal and external ear normal.      Nose: Rhinorrhea present.      Mouth/Throat:      Mouth: Mucous membranes are moist.   Eyes:      Conjunctiva/sclera: Conjunctivae normal.   Cardiovascular:      Rate and Rhythm: Normal rate and regular rhythm.      Heart sounds: Normal heart sounds.   Pulmonary:      Effort: Pulmonary effort is normal.      Breath sounds: Normal breath sounds. No wheezing.      Comments: Wet cough noted  Musculoskeletal:         General: Normal range of motion.   Skin:     General: Skin is dry.

## 2025-04-23 NOTE — ASSESSMENT & PLAN NOTE
Cataract surgery.  - Scheduled for cataract surgery today but had to cancel due to the cough.  - Rescheduling of cataract surgery will be determined based on the resolution of current symptoms.    4/7/2025:  Dr Blanchard [facility]  Procedure: Cataract extraction with intraocular lens implantation  Anesthesia: Topical with monitored anesthesia care  Date of procedure: 4/23/2025     Pt is cleared for surgery

## 2025-04-23 NOTE — ASSESSMENT & PLAN NOTE
- Experiencing nasal congestion and a cough for 2 weeks, worsening over time. The cough is present both day and night, sometimes with green phlegm. No fever, chest pain, shortness of breath, ear pain, dizziness, or falls.  - Examination revealed significant postnasal drip.  - Advised to use Astelin nasal spray, 1 spray in each nostril twice daily for 7 to 10 days, avoiding concurrent use of saline nasal spray.   - An antibiotic regimen of amoxicillin prescribed and sent to the [facility] pharmacy on [address].  - Acute cough-cont delsym 30mg BID prn  - Instructed to sleep with shoulders elevated, use a cool mist humidifier during sleep, and avoid dairy products. If chest congestion develops, use plain Mucinex twice daily with 8 ounces of water. Continue taking Delsym cough syrup 30 mL twice daily prn.    3/21/2024:  Take Mucinex BID with 8oz of water  Delsym for cough  Stay well hydrated  Avoid dairy til cough has subsided (thickens mucous)  Humidifier at night  Sleep with shoulders elevated    Initiate ABX  Discussed possible side effects  Complete ABX therapy to obtain full benefit    I suspect her difficulty swallowing is related to her increased phlegm as this sx didn't start until after she was sick.  Pt to call office for GI referral if sx dont improve.

## 2025-05-07 PROBLEM — Z01.818 PREOPERATIVE CLEARANCE: Status: RESOLVED | Noted: 2025-04-07 | Resolved: 2025-05-07

## 2025-06-06 DIAGNOSIS — E78.5 HYPERLIPIDEMIA, UNSPECIFIED HYPERLIPIDEMIA TYPE: ICD-10-CM

## 2025-06-06 NOTE — TELEPHONE ENCOUNTER
Last Office visit:  8/14/2024    Last Filled: 8/14/2024; Qty 50 w/ 3 refills     Follow up visit:    Future Appointments   Date Time Provider Department Center   7/30/2025  8:00 AM [facility] LAB VISIT [facility] DEP   8/15/2025 11:30 AM Porsche Aquino DNP [facility] DEP

## 2025-06-09 RX ORDER — ATORVASTATIN CALCIUM 20 MG/1
TABLET, FILM COATED ORAL
Qty: 45 TABLET | Refills: 3 | OUTPATIENT
Start: 2025-06-09

## 2025-07-24 ENCOUNTER — OFFICE VISIT (OUTPATIENT)
Facility: CLINIC | Age: 70
End: 2025-07-24
Payer: MEDICARE

## 2025-07-24 VITALS
SYSTOLIC BLOOD PRESSURE: 105 MMHG | BODY MASS INDEX: 34.55 KG/M2 | OXYGEN SATURATION: 99 % | WEIGHT: 183 LBS | HEART RATE: 61 BPM | DIASTOLIC BLOOD PRESSURE: 63 MMHG | HEIGHT: 61 IN | RESPIRATION RATE: 16 BRPM | TEMPERATURE: 98.4 F

## 2025-07-24 DIAGNOSIS — E66.09 CLASS 2 OBESITY DUE TO EXCESS CALORIES WITHOUT SERIOUS COMORBIDITY WITH BODY MASS INDEX (BMI) OF 38.0 TO 38.9 IN ADULT: ICD-10-CM

## 2025-07-24 DIAGNOSIS — Z01.818 PREOPERATIVE CLEARANCE: Primary | ICD-10-CM

## 2025-07-24 DIAGNOSIS — K59.03 DRUG-INDUCED CONSTIPATION: ICD-10-CM

## 2025-07-24 DIAGNOSIS — E66.812 CLASS 2 OBESITY DUE TO EXCESS CALORIES WITHOUT SERIOUS COMORBIDITY WITH BODY MASS INDEX (BMI) OF 38.0 TO 38.9 IN ADULT: ICD-10-CM

## 2025-07-24 DIAGNOSIS — H25.10 NUCLEAR SCLEROTIC CATARACT, UNSPECIFIED LATERALITY: ICD-10-CM

## 2025-07-24 DIAGNOSIS — H26.9 CORTICAL CATARACT: ICD-10-CM

## 2025-07-24 PROBLEM — H25.093 AGE-RELATED INCIPIENT CATARACT OF BOTH EYES: Status: RESOLVED | Noted: 2025-04-07 | Resolved: 2025-07-24

## 2025-07-24 PROCEDURE — 1126F AMNT PAIN NOTED NONE PRSNT: CPT | Performed by: NURSE PRACTITIONER

## 2025-07-24 PROCEDURE — 1036F TOBACCO NON-USER: CPT | Performed by: NURSE PRACTITIONER

## 2025-07-24 PROCEDURE — 1159F MED LIST DOCD IN RCRD: CPT | Performed by: NURSE PRACTITIONER

## 2025-07-24 PROCEDURE — 99214 OFFICE O/P EST MOD 30 MIN: CPT | Performed by: NURSE PRACTITIONER

## 2025-07-24 PROCEDURE — G8399 PT W/DXA RESULTS DOCUMENT: HCPCS | Performed by: NURSE PRACTITIONER

## 2025-07-24 PROCEDURE — G8427 DOCREV CUR MEDS BY ELIG CLIN: HCPCS | Performed by: NURSE PRACTITIONER

## 2025-07-24 PROCEDURE — 3074F SYST BP LT 130 MM HG: CPT | Performed by: NURSE PRACTITIONER

## 2025-07-24 PROCEDURE — 1090F PRES/ABSN URINE INCON ASSESS: CPT | Performed by: NURSE PRACTITIONER

## 2025-07-24 PROCEDURE — 3017F COLORECTAL CA SCREEN DOC REV: CPT | Performed by: NURSE PRACTITIONER

## 2025-07-24 PROCEDURE — G8417 CALC BMI ABV UP PARAM F/U: HCPCS | Performed by: NURSE PRACTITIONER

## 2025-07-24 PROCEDURE — 3078F DIAST BP <80 MM HG: CPT | Performed by: NURSE PRACTITIONER

## 2025-07-24 PROCEDURE — 1123F ACP DISCUSS/DSCN MKR DOCD: CPT | Performed by: NURSE PRACTITIONER

## 2025-07-24 RX ORDER — PREGABALIN 75 MG/1
CAPSULE ORAL
COMMUNITY

## 2025-07-24 RX ORDER — POLYETHYLENE GLYCOL 3350 17 G/17G
17 POWDER, FOR SOLUTION ORAL DAILY PRN
COMMUNITY
Start: 2025-07-24

## 2025-07-24 RX ORDER — ESTRADIOL 0.1 MG/G
CREAM VAGINAL
COMMUNITY
Start: 2025-07-21

## 2025-07-24 NOTE — ASSESSMENT & PLAN NOTE
- Experiencing constipation, likely a side effect of mounjaro  - MiraLAX recommended, to be taken daily as needed  - Dosage: one capful in 8 ounces of water or juice, to be consumed within 5 minutes  - If diarrhea occurs, reduce frequency to every other day  - MiraLAX noted as over-the-counter, generic version suggested  - Increase fiber and water in diet  - Stay active

## 2025-07-24 NOTE — PROGRESS NOTES
Dena Oglesby is a 69 y.o. female (: 1955) presenting to address:    Chief Complaint   Patient presents with    Pre-op Exam     Cataract surgery 2025 - 2025 w/ Dr. Keverline        Vitals:    25 1050   BP: 105/63   Pulse: 61   Resp: 16   Temp: 98.4 °F (36.9 °C)   SpO2: 99%       \"Have you been to the ER, urgent care clinic since your last visit?  Hospitalized since your last visit?\"    NO    “Have you seen or consulted any other health care providers outside of [facility] since your last visit?”    NO       Have you had a mammogram?”   NO    Date of last Mammogram: 3/15/2024           
understanding.    Past Medical History:   Diagnosis Date    Anxiety 04/05/2021    Class 2 obesity due to excess calories without serious comorbidity with body mass index (BMI) of 38.0 to 38.9 in adult 04/07/2025    Coronary artery disease involving native coronary artery of native heart without angina pectoris 04/07/2025    Diabetes ([facility])     Drug-induced constipation 07/24/2025    Environmental and seasonal allergies 02/18/2024    Essential hypertension 04/05/2021    Gastroesophageal reflux disease without esophagitis 01/24/2023    Grade I diastolic dysfunction 04/07/2025    Hyperlipidemia 04/05/2021    OAB (overactive bladder) 04/05/2021    Parkinson disease ([facility]) 04/05/2021    Dr Goldberg Cont sinemet as prescribed by neuro    Parkinson's disease ([facility]) 04/05/2021    Primary osteoarthritis of both knees 04/05/2021    Pulmonary fibrosis ([facility]) 08/14/2024 5/2024 CT CHEST WO CONTRAST  Order: [id number]  Impression  1. Minimal nonspecific pulmonary fibrosis. No honeycombing or additional evidence of interstitial lung disease.  2. Atherosclerosis including the coronary arteries. If the patient has associated symptoms recommend management per chest pain guidelines. If the patient is asymptomatic consider reviewing modifiable cardiovascular risk factors    Radiculopathy of arm 04/05/2021    Right hip pain 03/31/2025    RLS (restless legs syndrome) 04/05/2021    Sleep apnea 04/05/2021    Thrombocytopenia, unspecified 01/24/2023    Type 2 diabetes mellitus with hyperglycemia, without long-term current use of insulin ([facility]) 04/05/2021    A1c 5.8-5.9 Microalbumin good    Vertigo 11/06/2024     Current Outpatient Medications   Medication Sig    estradiol (ESTRACE) 0.1 MG/GM vaginal cream     Tirzepatide (MOUNJARO) 5 MG/0.5ML SOAJ pen Inject 1 pen  into the skin once a week    polyethylene glycol (GLYCOLAX) 17 GM/SCOOP powder Take 17 g by mouth daily as needed (constipation)    pregabalin (LYRICA) 75 MG capsule     azelastine

## 2025-07-24 NOTE — ASSESSMENT & PLAN NOTE
- Currently on a weight loss medication regimen prescribed by Libby  through an online program, started on 06/04/2025  - Lost 20 pounds since starting the medication  - Advised to continue current regimen and monitor for any side effects  - Follow up with [facility] online program as scheduled  - Encouraged to maintain exercise routine

## 2025-07-24 NOTE — ASSESSMENT & PLAN NOTE
- Cataract surgery on her left eye on 08/06/2025 and the right eye on 08/13/2025, to be performed by Dr. Blanchard at [facility].  - The procedure will involve the use of a topical anesthetic with possible IV sedation.  - Her diagnosis includes nuclear sclerotic cataract and cortical cataract    Pt is cleared for surgery

## 2025-07-29 ENCOUNTER — TELEPHONE (OUTPATIENT)
Facility: CLINIC | Age: 70
End: 2025-07-29

## 2025-07-29 DIAGNOSIS — Z00.00 MEDICARE ANNUAL WELLNESS VISIT, SUBSEQUENT: ICD-10-CM

## 2025-07-29 DIAGNOSIS — I10 ESSENTIAL HYPERTENSION: Primary | ICD-10-CM

## 2025-07-29 DIAGNOSIS — E78.5 HYPERLIPIDEMIA, UNSPECIFIED HYPERLIPIDEMIA TYPE: ICD-10-CM

## 2025-07-29 DIAGNOSIS — E11.65 TYPE 2 DIABETES MELLITUS WITH HYPERGLYCEMIA, WITHOUT LONG-TERM CURRENT USE OF INSULIN (HCC): ICD-10-CM

## 2025-07-29 NOTE — TELEPHONE ENCOUNTER
Future Appointments   Date Time Provider Department Center   7/30/2025  8:00 AM [facility]   8/15/2025 11:30 AM [facility]

## 2025-07-30 ENCOUNTER — LAB (OUTPATIENT)
Facility: CLINIC | Age: 70
End: 2025-07-30

## 2025-07-30 DIAGNOSIS — E11.65 TYPE 2 DIABETES MELLITUS WITH HYPERGLYCEMIA, WITHOUT LONG-TERM CURRENT USE OF INSULIN (HCC): ICD-10-CM

## 2025-07-30 DIAGNOSIS — Z00.00 MEDICARE ANNUAL WELLNESS VISIT, SUBSEQUENT: ICD-10-CM

## 2025-07-30 DIAGNOSIS — E78.5 HYPERLIPIDEMIA, UNSPECIFIED HYPERLIPIDEMIA TYPE: ICD-10-CM

## 2025-07-30 DIAGNOSIS — I10 ESSENTIAL HYPERTENSION: ICD-10-CM

## 2025-07-31 LAB
ALBUMIN SERPL-MCNC: 4.1 G/DL (ref 3.8–4.8)
ALP SERPL-CCNC: 107 IU/L (ref 44–121)
ALT SERPL-CCNC: 15 IU/L (ref 0–32)
AST SERPL-CCNC: 18 IU/L (ref 0–40)
BILIRUB SERPL-MCNC: 0.4 MG/DL (ref 0–1.2)
BUN SERPL-MCNC: 11 MG/DL (ref 8–27)
BUN/CREAT SERPL: 15 (ref 12–28)
CALCIUM SERPL-MCNC: 9.7 MG/DL (ref 8.7–10.3)
CHLORIDE SERPL-SCNC: 100 MMOL/L (ref 96–106)
CHOLEST SERPL-MCNC: 90 MG/DL (ref 100–199)
CO2 SERPL-SCNC: 23 MMOL/L (ref 20–29)
CREAT SERPL-MCNC: 0.71 MG/DL (ref 0.57–1)
EGFRCR SERPLBLD CKD-EPI 2021: 91 ML/MIN/1.73
GLOBULIN SER CALC-MCNC: 2.5 G/DL (ref 1.5–4.5)
GLUCOSE SERPL-MCNC: 83 MG/DL (ref 70–99)
HBA1C MFR BLD: 5.8 % (ref 4.8–5.6)
HDLC SERPL-MCNC: 35 MG/DL
LDLC SERPL CALC-MCNC: 39 MG/DL (ref 0–99)
POTASSIUM SERPL-SCNC: 5.3 MMOL/L (ref 3.5–5.2)
PROT SERPL-MCNC: 6.6 G/DL (ref 6–8.5)
SODIUM SERPL-SCNC: 138 MMOL/L (ref 134–144)
TRIGL SERPL-MCNC: 73 MG/DL (ref 0–149)
VLDLC SERPL CALC-MCNC: 16 MG/DL (ref 5–40)

## 2025-08-15 ENCOUNTER — OFFICE VISIT (OUTPATIENT)
Facility: CLINIC | Age: 70
End: 2025-08-15
Payer: MEDICARE

## 2025-08-15 VITALS
HEIGHT: 61 IN | OXYGEN SATURATION: 97 % | TEMPERATURE: 97.8 F | WEIGHT: 176 LBS | BODY MASS INDEX: 33.23 KG/M2 | SYSTOLIC BLOOD PRESSURE: 122 MMHG | DIASTOLIC BLOOD PRESSURE: 72 MMHG | RESPIRATION RATE: 18 BRPM | HEART RATE: 65 BPM

## 2025-08-15 DIAGNOSIS — J84.10 PULMONARY FIBROSIS (HCC): ICD-10-CM

## 2025-08-15 DIAGNOSIS — M79.89 RIGHT LEG SWELLING: ICD-10-CM

## 2025-08-15 DIAGNOSIS — G25.81 RLS (RESTLESS LEGS SYNDROME): ICD-10-CM

## 2025-08-15 DIAGNOSIS — Z12.31 BREAST CANCER SCREENING BY MAMMOGRAM: ICD-10-CM

## 2025-08-15 DIAGNOSIS — E87.5 HYPERKALEMIA: ICD-10-CM

## 2025-08-15 DIAGNOSIS — E78.5 HYPERLIPIDEMIA, UNSPECIFIED HYPERLIPIDEMIA TYPE: ICD-10-CM

## 2025-08-15 DIAGNOSIS — I51.89 GRADE I DIASTOLIC DYSFUNCTION: ICD-10-CM

## 2025-08-15 DIAGNOSIS — G89.29 CHRONIC LEFT SHOULDER PAIN: ICD-10-CM

## 2025-08-15 DIAGNOSIS — G20.A1 PARKINSON'S DISEASE, UNSPECIFIED WHETHER DYSKINESIA PRESENT, UNSPECIFIED WHETHER MANIFESTATIONS FLUCTUATE (HCC): ICD-10-CM

## 2025-08-15 DIAGNOSIS — R05.3 CHRONIC COUGH: ICD-10-CM

## 2025-08-15 DIAGNOSIS — E66.812 CLASS 2 OBESITY DUE TO EXCESS CALORIES WITHOUT SERIOUS COMORBIDITY WITH BODY MASS INDEX (BMI) OF 38.0 TO 38.9 IN ADULT: ICD-10-CM

## 2025-08-15 DIAGNOSIS — N32.81 OAB (OVERACTIVE BLADDER): ICD-10-CM

## 2025-08-15 DIAGNOSIS — L73.9 CHRONIC FOLLICULITIS: ICD-10-CM

## 2025-08-15 DIAGNOSIS — Z71.89 ACP (ADVANCE CARE PLANNING): ICD-10-CM

## 2025-08-15 DIAGNOSIS — E66.09 CLASS 2 OBESITY DUE TO EXCESS CALORIES WITHOUT SERIOUS COMORBIDITY WITH BODY MASS INDEX (BMI) OF 38.0 TO 38.9 IN ADULT: ICD-10-CM

## 2025-08-15 DIAGNOSIS — M25.512 CHRONIC LEFT SHOULDER PAIN: ICD-10-CM

## 2025-08-15 DIAGNOSIS — Z00.00 MEDICARE ANNUAL WELLNESS VISIT, SUBSEQUENT: Primary | ICD-10-CM

## 2025-08-15 DIAGNOSIS — I25.10 CORONARY ARTERY DISEASE INVOLVING NATIVE CORONARY ARTERY OF NATIVE HEART WITHOUT ANGINA PECTORIS: ICD-10-CM

## 2025-08-15 DIAGNOSIS — I10 ESSENTIAL HYPERTENSION: ICD-10-CM

## 2025-08-15 DIAGNOSIS — M85.80 OSTEOPENIA, UNSPECIFIED LOCATION: ICD-10-CM

## 2025-08-15 DIAGNOSIS — F41.9 ANXIETY: ICD-10-CM

## 2025-08-15 DIAGNOSIS — J30.89 ENVIRONMENTAL AND SEASONAL ALLERGIES: ICD-10-CM

## 2025-08-15 DIAGNOSIS — E11.65 TYPE 2 DIABETES MELLITUS WITH HYPERGLYCEMIA, WITHOUT LONG-TERM CURRENT USE OF INSULIN (HCC): ICD-10-CM

## 2025-08-15 PROCEDURE — 1123F ACP DISCUSS/DSCN MKR DOCD: CPT | Performed by: NURSE PRACTITIONER

## 2025-08-15 PROCEDURE — 99214 OFFICE O/P EST MOD 30 MIN: CPT | Performed by: NURSE PRACTITIONER

## 2025-08-15 PROCEDURE — G0439 PPPS, SUBSEQ VISIT: HCPCS | Performed by: NURSE PRACTITIONER

## 2025-08-15 PROCEDURE — 3078F DIAST BP <80 MM HG: CPT | Performed by: NURSE PRACTITIONER

## 2025-08-15 PROCEDURE — 1126F AMNT PAIN NOTED NONE PRSNT: CPT | Performed by: NURSE PRACTITIONER

## 2025-08-15 PROCEDURE — 2022F DILAT RTA XM EVC RTNOPTHY: CPT | Performed by: NURSE PRACTITIONER

## 2025-08-15 PROCEDURE — 3017F COLORECTAL CA SCREEN DOC REV: CPT | Performed by: NURSE PRACTITIONER

## 2025-08-15 PROCEDURE — 1090F PRES/ABSN URINE INCON ASSESS: CPT | Performed by: NURSE PRACTITIONER

## 2025-08-15 PROCEDURE — 1159F MED LIST DOCD IN RCRD: CPT | Performed by: NURSE PRACTITIONER

## 2025-08-15 PROCEDURE — G8399 PT W/DXA RESULTS DOCUMENT: HCPCS | Performed by: NURSE PRACTITIONER

## 2025-08-15 PROCEDURE — G8417 CALC BMI ABV UP PARAM F/U: HCPCS | Performed by: NURSE PRACTITIONER

## 2025-08-15 PROCEDURE — 3044F HG A1C LEVEL LT 7.0%: CPT | Performed by: NURSE PRACTITIONER

## 2025-08-15 PROCEDURE — G8427 DOCREV CUR MEDS BY ELIG CLIN: HCPCS | Performed by: NURSE PRACTITIONER

## 2025-08-15 PROCEDURE — 3074F SYST BP LT 130 MM HG: CPT | Performed by: NURSE PRACTITIONER

## 2025-08-15 PROCEDURE — 1036F TOBACCO NON-USER: CPT | Performed by: NURSE PRACTITIONER

## 2025-08-15 PROCEDURE — 99497 ADVNCD CARE PLAN 30 MIN: CPT | Performed by: NURSE PRACTITIONER

## 2025-08-15 RX ORDER — ATORVASTATIN CALCIUM 20 MG/1
TABLET, FILM COATED ORAL
Qty: 90 TABLET | Refills: 3 | Status: SHIPPED | OUTPATIENT
Start: 2025-08-15

## 2025-08-15 RX ORDER — CITALOPRAM HYDROBROMIDE 40 MG/1
40 TABLET ORAL DAILY
Qty: 90 TABLET | Refills: 1 | Status: SHIPPED | OUTPATIENT
Start: 2025-08-15

## 2025-08-15 RX ORDER — AZELASTINE 1 MG/ML
1 SPRAY, METERED NASAL DAILY PRN
Qty: 30 ML | Refills: 3 | Status: SHIPPED | OUTPATIENT
Start: 2025-08-15

## 2025-08-15 ASSESSMENT — PATIENT HEALTH QUESTIONNAIRE - PHQ9
SUM OF ALL RESPONSES TO PHQ QUESTIONS 1-9: 0
SUM OF ALL RESPONSES TO PHQ QUESTIONS 1-9: 0
1. LITTLE INTEREST OR PLEASURE IN DOING THINGS: NOT AT ALL
SUM OF ALL RESPONSES TO PHQ QUESTIONS 1-9: 0
2. FEELING DOWN, DEPRESSED OR HOPELESS: NOT AT ALL
SUM OF ALL RESPONSES TO PHQ QUESTIONS 1-9: 0

## 2025-08-18 PROBLEM — G89.29 CHRONIC LEFT SHOULDER PAIN: Status: RESOLVED | Noted: 2024-02-14 | Resolved: 2025-08-18

## 2025-08-18 PROBLEM — R09.82 POST-NASAL DRIP: Status: RESOLVED | Noted: 2025-04-23 | Resolved: 2025-08-18

## 2025-08-18 PROBLEM — M25.561 CHRONIC PAIN OF RIGHT KNEE: Status: RESOLVED | Noted: 2025-04-07 | Resolved: 2025-08-18

## 2025-08-18 PROBLEM — M25.512 CHRONIC LEFT SHOULDER PAIN: Status: RESOLVED | Noted: 2024-02-14 | Resolved: 2025-08-18

## 2025-08-18 PROBLEM — G89.29 CHRONIC PAIN OF RIGHT KNEE: Status: RESOLVED | Noted: 2025-04-07 | Resolved: 2025-08-18

## 2025-08-19 PROBLEM — M79.89 RIGHT LEG SWELLING: Status: ACTIVE | Noted: 2025-08-19

## 2025-08-23 PROBLEM — Z01.818 PREOPERATIVE CLEARANCE: Status: RESOLVED | Noted: 2025-04-07 | Resolved: 2025-08-23

## 2025-09-04 LAB — POTASSIUM SERPL-SCNC: 4.5 MMOL/L (ref 3.5–5.2)
